# Patient Record
Sex: FEMALE | Race: WHITE | NOT HISPANIC OR LATINO | Employment: FULL TIME | URBAN - METROPOLITAN AREA
[De-identification: names, ages, dates, MRNs, and addresses within clinical notes are randomized per-mention and may not be internally consistent; named-entity substitution may affect disease eponyms.]

---

## 2017-04-17 ENCOUNTER — ALLSCRIPTS OFFICE VISIT (OUTPATIENT)
Dept: OTHER | Facility: OTHER | Age: 37
End: 2017-04-17

## 2017-04-17 DIAGNOSIS — G93.2 BENIGN INTRACRANIAL HYPERTENSION: ICD-10-CM

## 2017-08-22 ENCOUNTER — ALLSCRIPTS OFFICE VISIT (OUTPATIENT)
Dept: OTHER | Facility: OTHER | Age: 37
End: 2017-08-22

## 2017-10-23 NOTE — PROGRESS NOTES
Assessment  1  Occipital neuralgia of right side (723 8) (M54 81)   2  Pseudotumor cerebri (348 2) (G93 2)    Plan  Occipital neuralgia of right side    · Orphenadrine Citrate  MG Oral Tablet Extended Release 12 Hour; Take 1  tablet by mouth at bedtime   Rx By: Rachel Reaves; Dispense: 30 Days ; #:30 Tablet Extended Release 12 Hour; Refill: 3;For: Occipital neuralgia of right side; WESTON = N; Verified Transmission to [id number]; Last Updated By: System, SureScripts; 8/22/2017 11:14:20 AM  Pseudotumor cerebri    · AcetaZOLAMIDE 250 MG Oral Tablet; TAKE 2 TABLET Every 8 hours   Rx By: Rachel Reaves; Dispense: 30 Days ; #:180 Tablet; Refill: 3;For: Pseudotumor cerebri; WESTON = N; Verified Transmission to [id number]; Last Updated By: System, SureScripts; 8/22/2017 11:13:08 AM   · Follow-up visit in 4 Months Evaluation and Treatment  Follow-up  Status: Hold For -  Scheduling  Requested for: 58Lwu5702   Ordered; For: Pseudotumor cerebri; Ordered By: Rachel Reaves Performed:  Due: 03USZ1326   · Continue with our present treatment plan ; Status:Complete;   Done: 47Fpg9911   Ordered; For:Pseudotumor cerebri; Ordered By:Sony Nuñez;    Discussion/Summary  Discussion Summary:   Patient with a history of pseudotumor cerebri, occipital neuralgia is advised to taper herself off the gabapentin and continue orphenadrine on a regular basis  She also is advised to increase the dose of Diamox to 2 tablets 3 times a day if needed otherwise will maintain herself on 2 tablets twice a day  Patient has been doing that intermittently and runs out of her medications  She will return back to see me in 4 months  Chief Complaint  Chief Complaint Free Text Note Form: 39year old patient has returned for a follow up appointment for her pseudo tumor cerebri and neuralgia        History of Present Illness  HPI: Patient is here for a follow-up visit with a history of pseudotumor headaches, occipital neuralgia and overall has been maintaining herself with the help of Diamox, gabapentin and uses orphenadrine on a when necessary basis  She also is followed up with her OB/GYN and has been experiencing severe hormonal issues causing irregular periods  Patient denies any other neurological symptoms except for occasional worsening neck pain during which time she uses orphenadrine  Review of Systems  Neurological ROS:   Constitutional: recent weight gain-- and-- fatigue  HEENT:  no sinus problems, not feeling congested, no blurred vision, no dryness of the eyes, no eye pain, no hearing loss, no tinnitus, no mouth sores, no sore throat, no hoarseness, no dysphagia, no masses, no bleeding  Cardiovascular:  no chest pain or pressure, no palpitations present, the heart rate was not rapid or irregular, no swelling in the arms or legs, no poor circulation  Respiratory:  no unusual or persistant cough, no shortness of breath with or without exertion  Gastrointestinal: nausea  Genitourinary:  no incontinence, no feelings of urinary urgency, no increase in frequency, no urinary hesitancy, no dysuria, no hematuria  Musculoskeletal: head/neck/back pain  Integumentary  no masses, no rash, no skin lesions, no livedo reticularis  Psychiatric: anxiety-- and-- depression  Endocrine loss of sexual ability or drive   Hematologic/Lymphatic:  no unusual bleeding, no tendency for easy bruising, no clotting skin or lumps  Neurological General: headache,-- lightheadedness,-- increased sleepiness,-- trouble falling asleep-- and-- waking up at night  Neurological Mental Status: memory problems  Neurological Cranial Nerves: vertigo or dizziness  Neurological Motor findings include:  no tremor, no twitching, no cramping(pre/post exercise), no atrophy  Neurological Coordination:  no unsteadiness, no vertigo or dizziness, no clumsiness, no problems reaching for objects     Neurological Sensory:  no numbness, no pain, no tingling, does not fall when eyes closed or taking a shower  Neurological Gait:  no difficulty walking, not falling to one side, no sensation of being pushed, has not had falls  ROS Reviewed:   ROS reviewed  Active Problems  1  Anxiety (300 00) (F41 9)   2  Headache (784 0) (R51)   3  Hypothyroidism (244 9) (E03 9)   4  Insulin resistance (277 7) (E88 81)   5  Irritable bowel syndrome (564 1) (K58 9)   6  Occipital neuralgia of right side (723 8) (M54 81)   7  Papilledema (377 00) (H47 10)   8  Pseudotumor cerebri (348 2) (G93 2)    Past Medical History  1  History of asthma (V12 69) (Z87 09)    Surgical History  1  History of Adenoidectomy   2  History of  Section   3  History of Cholecystectomy   4  Denied: History of Cholesteatoma Surgery   5  History of Dilation And Curettage   6  History of Tonsillectomy   7  History of Umbilical Hernia Repair    Family History  Mother    1  Family history of hypertension (V17 49) (Z82 49)   2  Family history of hypothyroidism (V18 19) (Z83 49)   3  Family history of malignant neoplasm of breast (V16 3) (Z80 3)  Father    4  Family history of hypertension (V17 49) (Z82 49)   5  Family history of hypothyroidism (V18 19) (Z83 49)   6  Family history of Insulin resistance  Sister    7  Family history of hypothyroidism (V18 19) (Z83 49)    Social History   · Full-time employment   ·    · Never a smoker   · Social alcohol use (Z78 9)  Social History Reviewed: The social history was reviewed and updated today  Current Meds   1  AcetaZOLAMIDE 250 MG Oral Tablet; take 2 tablet twice daily; Therapy: 78YSV4048 to (Evaluate:37Vuz4256)  Requested for: 84Nnm5221; Last   Rx:83Tju7538 Ordered   2  Gabapentin 100 MG Oral Capsule; take 1 capsule 3 times a day; Therapy: 54VRD3660 to (Evaluate:84Ydj3255)  Requested for: 17Aed7710; Last   Rx:87Mdy5032 Ordered   3  Levothyroxine Sodium 88 MCG Oral Tablet; TAKE 1 TABLET DAILY;    Therapy: (Recorded:32Zwu0764) to Recorded   4  Multi-Vitamin TABS; TAKE 1 TABLET DAILY; Therapy: (Recorded:49Fer5680) to Recorded   5  Pantoprazole Sodium 40 MG Oral Tablet Delayed Release; TAKE 1 TABLET DAILY; Therapy: (Stefany Pantoja) to Recorded   6  Saxenda 18 MG/3ML Subcutaneous Solution Pen-injector; use as directed; Therapy: (Recorded:30Xdy5689) to Recorded   7  Singulair TABS; TAKE 1 TABLET DAILY; Therapy: (Recorded:11Iog8819) to Recorded   8  Venlafaxine HCl - 100 MG Oral Tablet; Take 1 tablet twice daily; Therapy: (Recorded:23Xhp6343) to Recorded   9  ZOLMitriptan 5 MG Oral Tablet; TAKE 1 TABLET AT ONSET OF MIGRAINE  MAY REPEAT   ONCE AFTER 2 HOURS  MAX 10 MG/DAY; Therapy: 99VYU3211 to (Wilmer Brower)  Requested for: 38RPH9462; Last   Rx:03Mar2017 Ordered  Medication List Reviewed: The medication list was reviewed and updated today  Allergies  1  Sulfa Drugs  2  Other   3  Mold   4  Seasonal    Vitals  Signs   Recorded: 22Aug2017 10:43AM   Heart Rate: 89  Systolic: 554  Diastolic: 84  Height: 5 ft 0 5 in  Weight: 197 lb   BMI Calculated: 37 84  BSA Calculated: 1 87    Physical Exam    Constitutional   General appearance: No acute distress, well appearing and well nourished  Musculoskeletal   Gait and station: Normal gait, stance and balance  Muscle strength: Normal strength throughout  Muscle tone: No atrophy, abnormal movements, flaccidity, cogwheeling or spasticity  Neurologic   Orientation to person, place, and time: Normal     Language: Names objects, able to repeat phrases and speaks spontaneously  3rd, 4th, and 6th cranial nerves: Normal     7th cranial nerve: Normal     Sensation: Normal     Reflexes: Normal     Coordination: Normal   Patient has evidence of suboccipital tenderness on the right side and upper cervical paraspinal tenderness  Signatures   Electronically signed by :  Suzy Michelle MD; Aug 22 2017 11:17AM EST                       (Author)

## 2018-01-08 ENCOUNTER — ALLSCRIPTS OFFICE VISIT (OUTPATIENT)
Dept: OTHER | Facility: OTHER | Age: 38
End: 2018-01-08

## 2018-01-09 NOTE — PROGRESS NOTES
Assessment   1  Occipital neuralgia of right side (723 8) (M54 81)   2  Pseudotumor cerebri (348 2) (G93 2)    Plan   Anxiety    · Venlafaxine HCl - 100 MG Oral Tablet   Dispense: 0 Days ; #: Sufficient Tablet; Refill: 0;For: Anxiety; WESTON = N; Record; Last Updated By: Allie Huston; 1/8/2018 10:37:29 AM  Pseudotumor cerebri    · Continue with our present treatment plan ; Status:Complete;   Done: 01NCI3448   Ordered; For:Pseudotumor cerebri; Ordered By:Sony Nuñez;   · Follow-up visit in 3 months Evaluation and Treatment  Follow-up  Status: Complete     Done: 88WXM1147   Ordered; For: Pseudotumor cerebri; Ordered By: Allie Huston Performed:  Due: 06YJX8459; Last Updated By: Dena Rai; 1/8/2018 10:49:15 AM    Discussion/Summary   Discussion Summary:    Patient with a history of occipital neuralgia, pseudotumor cerebri, is advised to continue present medications  She also suffers from fibromyalgia for which she remains on Cymbalta 60 mg daily  Home exercise program is encouraged, she is to undergo a partial hysterectomy this week for endometriosis and will return back to see me in 3 months  Chief Complaint   Chief Complaint Free Text Note Form: Patient is here for follow up visit for her history of occipital neuralgia and pseudotumor cerebri  History of Present Illness   HPI: Patient is here for a follow-up visit with a history of pseudotumor cerebri, occipital neuralgia, fibromyalgia, and is to is to undergo partial hysterectomy this week for endometriosis  Patient has been on Diamox 500 mg twice a day and Norflex 100 mg at bedtime which she uses only on a p r n  basis  Overall she has been doing well from a headache standpoint, with the current medications and denies any new neurological symptoms  Review of Systems   Neurological ROS:      Constitutional: fever,-- chills-- and-- appetite changes        HEENT:  no sinus problems, not feeling congested, no blurred vision, no dryness of the eyes, no eye pain, no hearing loss, no tinnitus, no mouth sores, no sore throat, no hoarseness, no dysphagia, no masses, no bleeding  Cardiovascular:  no chest pain or pressure, no palpitations present, the heart rate was not rapid or irregular, no swelling in the arms or legs, no poor circulation  Respiratory: unusual or persistant cough-- and-- shortness of breath with or without exertion  Gastrointestinal:  no nausea, no vomiting, no diarrhea, no abdominal pain, no changes in bowel habits, no melena, no loss of bowel control  Genitourinary:  no incontinence, no feelings of urinary urgency, no increase in frequency, no urinary hesitancy, no dysuria, no hematuria  Musculoskeletal: arthralgias,-- myalgias,-- head/neck/back pain-- and-- pain while walking  Integumentary  no masses, no rash, no skin lesions, no livedo reticularis  Psychiatric: anxiety  Endocrine  no unusual weight loss or gain, no excessive urination, no excessive thirst, no hair loss or gain, no hot or cold intolerance, no menstrual period change or irregularity, no loss of sexual ability or drive, no erection difficulty, no nipple discharge  Hematologic/Lymphatic:  no unusual bleeding, no tendency for easy bruising, no clotting skin or lumps  Neurological General: headache,-- lightheadedness,-- trouble falling asleep-- and-- waking up at night  Neurological Mental Status: memory problems  Neurological Cranial Nerves: facial numbness or weakness-- and-- vertigo or dizziness  Neurological Motor findings include: tremor,-- twitching-- and-- cramping(pre/post exercise)  Neurological Coordination: balance difficulties-- and-- clumsiness  Neurological Sensory: numbness-- and-- tingling  Neurological Gait: difficulty walking-- and-- has had falls  ROS Reviewed:    ROS reviewed  Active Problems   1  Anxiety (300 00) (F41 9)   2  Headache (784 0) (R51)   3   Hypothyroidism (244  9) (E03 9)   4  Insulin resistance (277 7) (E88 81)   5  Irritable bowel syndrome (564 1) (K58 9)   6  Occipital neuralgia of right side (723 8) (M54 81)   7  Papilledema (377 00) (H47 10)   8  Pseudotumor cerebri (348 2) (G93 2)    Past Medical History   1  History of asthma (V12 69) (Z87 09)    Surgical History   1  History of Adenoidectomy   2  History of  Section   3  History of Cholecystectomy   4  Denied: History of Cholesteatoma Surgery   5  History of Dilation And Curettage   6  History of Tonsillectomy   7  History of Umbilical Hernia Repair    Family History   Mother    1  Family history of hypertension (V17 49) (Z82 49)   2  Family history of hypothyroidism (V18 19) (Z83 49)   3  Family history of malignant neoplasm of breast (V16 3) (Z80 3)  Father    4  Family history of hypertension (V17 49) (Z82 49)   5  Family history of hypothyroidism (V18 19) (Z83 49)   6  Family history of Insulin resistance  Sister    7  Family history of hypothyroidism (V18 19) (Z83 49)    Social History    · Full-time employment   ·    · Never a smoker   · Social alcohol use (Z78 9)  Social History Reviewed: The social history was reviewed and updated today  Current Meds    1  AcetaZOLAMIDE 250 MG Oral Tablet; take 2 tablet twice daily; Therapy: 55UUE2281 to (Evaluate:80Cat9479)  Requested for: 98Enx6773; Last     Rx:62Iuz9672 Ordered   2  Cymbalta 60 MG Oral Capsule Delayed Release Particles; TAKE 1 CAPSULE Daily; Therapy: (([phone]) to Recorded   3  Gabapentin 100 MG Oral Capsule; TAKE 2 CAPSULE Twice daily; Therapy: 42VTO4643 to (Evaluate:2018)  Requested for: 54UWS4343; Last     Rx:2017 Ordered   4  Levothyroxine Sodium 88 MCG Oral Tablet; TAKE 1 TABLET DAILY; Therapy: (Recorded:74Nre3730) to Recorded   5  Multi-Vitamin TABS; TAKE 1 TABLET DAILY; Therapy: (Recorded:27Bob3865) to Recorded   6   Orphenadrine Citrate  MG Oral Tablet Extended Release 12 Hour; Take 1 tablet by     mouth at bedtime; Therapy: 45Zjv4951 to (Evaluate:84Qmn7055)  Requested for: 78Jmk6442; Last     Rx:51Orn0507 Ordered   7  Pantoprazole Sodium 40 MG Oral Tablet Delayed Release; TAKE 1 TABLET DAILY; Therapy: (Norris Skelton) to Recorded   8  Saxenda 18 MG/3ML Subcutaneous Solution Pen-injector; use as directed; Therapy: (Recorded:23Bcr2722) to Recorded   9  Singulair TABS; TAKE 1 TABLET DAILY; Therapy: (Recorded:11Sot0571) to Recorded   10  Venlafaxine HCl - 100 MG Oral Tablet; Take 1 tablet twice daily; Therapy: (Recorded:29Inu5916) to Recorded   11  Vitamin D 2000 UNIT Oral Tablet; Take 1 tablet twice daily; Therapy: (353 081 865) to Recorded   12  ZOLMitriptan 5 MG Oral Tablet; TAKE 1 TABLET AT ONSET OF MIGRAINE  MAY REPEAT      ONCE AFTER 2 HOURS  MAX 10 MG/DAY; Therapy: 91MPC1700 to (Tj Mckeon)  Requested for: 30HPV3903; Last      Rx:03Mar2017 Ordered  Medication List Reviewed: The medication list was reviewed and updated today  Allergies   1  Sulfa Drugs  2  Other   3  Mold   4  Seasonal    Vitals   Signs   Recorded: 39EKD4044 10:12AM   Heart Rate: 95  Systolic: 991  Diastolic: 76  Height: 5 ft   Weight: 206 lb   BMI Calculated: 40 23  BSA Calculated: 1 89    Physical Exam        Constitutional      General appearance: No acute distress, well appearing and well nourished  Musculoskeletal      Gait and station: Normal gait, stance and balance  Muscle strength: Normal strength throughout  Muscle tone: No atrophy, abnormal movements, flaccidity, cogwheeling or spasticity  Neurologic      Recent and remote memory: Demonstrates normal memory         3rd, 4th, and 6th cranial nerves: Normal        7th cranial nerve: Normal        Sensation: Normal        Reflexes: Normal        Coordination: Normal   Patient has evidence of cervical and suboccipital tenderness bilaterally more prominent on the left side than the right       Signatures    Electronically signed by :  Kwesi Baca MD; Jan 8 2018 10:50AM EST                       (Author)

## 2018-01-10 NOTE — MISCELLANEOUS
Dear Brittney Powers : We missed you for your originally scheduled neurological followup appointment with Dr Fredrick Flowers  Please call at your earliest convenience to reschedule this appointment  Sincerely,     [place]           Electronically signed by: Leonides Saldaña   Jul 7 2016 11:22AM EST Co-author

## 2018-01-12 VITALS
WEIGHT: 197 LBS | HEIGHT: 61 IN | HEART RATE: 89 BPM | DIASTOLIC BLOOD PRESSURE: 84 MMHG | SYSTOLIC BLOOD PRESSURE: 112 MMHG | BODY MASS INDEX: 37.19 KG/M2

## 2018-01-13 VITALS
BODY MASS INDEX: 36.47 KG/M2 | HEART RATE: 72 BPM | SYSTOLIC BLOOD PRESSURE: 127 MMHG | DIASTOLIC BLOOD PRESSURE: 74 MMHG | HEIGHT: 61 IN | WEIGHT: 193.19 LBS

## 2018-01-22 VITALS
BODY MASS INDEX: 40.44 KG/M2 | DIASTOLIC BLOOD PRESSURE: 76 MMHG | SYSTOLIC BLOOD PRESSURE: 104 MMHG | HEART RATE: 95 BPM | HEIGHT: 60 IN | WEIGHT: 206 LBS

## 2018-03-22 ENCOUNTER — TELEPHONE (OUTPATIENT)
Dept: NEUROLOGY | Facility: CLINIC | Age: 38
End: 2018-03-22

## 2018-03-22 NOTE — TELEPHONE ENCOUNTER
Called patient, left message on machine, advised that it could be the Synthroid or the phentermine causing the tremors and she should speak to her endocrinologist and advised to call back if she has any other questions

## 2018-03-22 NOTE — TELEPHONE ENCOUNTER
Patient requesting to speak to Dr Andre Newman (she stated she used to be able to leave a voicemail and Dr called her back)  Complaining B/L extremity muscle weakness and shaking  States when she signs something her hand "shakes"  When she hits the break while driving, her thigh shakes  She initially noticed this about 2 weeks ago  She stated she tried to get an appt but scheduling told her there was nothing available      Current meds:  Effexor 150 mg one at bed  Orphenadrine 100 mg at bed  Synthroid 88 mcg in AM  Gabapentin 100 mg 2 in AM and 2 in PM  Phentermine 37 mg in AM  saxenda inj daily  diamox 250 mg 2 in AM and 2 in PM  Vit D    [phone]

## 2018-03-26 ENCOUNTER — OFFICE VISIT (OUTPATIENT)
Dept: NEUROLOGY | Facility: CLINIC | Age: 38
End: 2018-03-26
Payer: COMMERCIAL

## 2018-03-26 VITALS
BODY MASS INDEX: 39.26 KG/M2 | WEIGHT: 201 LBS | HEART RATE: 96 BPM | DIASTOLIC BLOOD PRESSURE: 88 MMHG | SYSTOLIC BLOOD PRESSURE: 128 MMHG

## 2018-03-26 DIAGNOSIS — G93.2 PSEUDOTUMOR CEREBRI: ICD-10-CM

## 2018-03-26 DIAGNOSIS — M54.81 OCCIPITAL NEURALGIA OF RIGHT SIDE: ICD-10-CM

## 2018-03-26 DIAGNOSIS — F41.9 ANXIETY DISORDER, UNSPECIFIED TYPE: Primary | ICD-10-CM

## 2018-03-26 DIAGNOSIS — R25.1 TREMOR: ICD-10-CM

## 2018-03-26 PROCEDURE — 99214 OFFICE O/P EST MOD 30 MIN: CPT | Performed by: PSYCHIATRY & NEUROLOGY

## 2018-03-26 RX ORDER — MIRTAZAPINE 7.5 MG/1
7.5 TABLET, FILM COATED ORAL
Qty: 30 TABLET | Refills: 0 | Status: SHIPPED | OUTPATIENT
Start: 2018-03-26 | End: 2018-04-25

## 2018-03-26 RX ORDER — PEN NEEDLE, DIABETIC 32GX 5/32"
NEEDLE, DISPOSABLE MISCELLANEOUS
Refills: 4 | COMMUNITY
Start: 2018-03-19

## 2018-03-26 RX ORDER — LIRAGLUTIDE 6 MG/ML
INJECTION, SOLUTION SUBCUTANEOUS
Refills: 4 | COMMUNITY
Start: 2018-03-13 | End: 2019-09-17 | Stop reason: ALTCHOICE

## 2018-03-26 RX ORDER — ACETAZOLAMIDE 250 MG/1
2 TABLET ORAL 3 TIMES DAILY
COMMUNITY
Start: 2016-06-23 | End: 2018-07-26 | Stop reason: SDUPTHER

## 2018-03-26 RX ORDER — NITROFURANTOIN 25; 75 MG/1; MG/1
CAPSULE ORAL
Refills: 1 | COMMUNITY
Start: 2018-01-23 | End: 2018-09-23

## 2018-03-26 RX ORDER — ORPHENADRINE CITRATE 100 MG/1
100 TABLET, EXTENDED RELEASE ORAL
Refills: 3 | COMMUNITY
Start: 2018-03-08 | End: 2018-05-17

## 2018-03-26 RX ORDER — LEVOTHYROXINE SODIUM 88 UG/1
1 TABLET ORAL DAILY
COMMUNITY

## 2018-03-26 RX ORDER — MULTIVIT-MIN/IRON/FOLIC ACID/K 18-600-40
1 CAPSULE ORAL 2 TIMES DAILY
COMMUNITY
End: 2019-09-17 | Stop reason: ALTCHOICE

## 2018-03-26 RX ORDER — VENLAFAXINE HYDROCHLORIDE 150 MG/1
150 CAPSULE, EXTENDED RELEASE ORAL DAILY
COMMUNITY

## 2018-03-26 RX ORDER — PANTOPRAZOLE SODIUM 40 MG/1
1 TABLET, DELAYED RELEASE ORAL DAILY
COMMUNITY
End: 2019-09-17 | Stop reason: SDUPTHER

## 2018-03-26 RX ORDER — DULOXETIN HYDROCHLORIDE 60 MG/1
1 CAPSULE, DELAYED RELEASE ORAL DAILY
COMMUNITY
End: 2018-03-26 | Stop reason: ALTCHOICE

## 2018-03-26 RX ORDER — MULTIVITAMIN
1 TABLET ORAL DAILY
COMMUNITY

## 2018-03-26 RX ORDER — FLUCONAZOLE 150 MG/1
TABLET ORAL
Refills: 1 | COMMUNITY
Start: 2018-01-12 | End: 2018-09-23

## 2018-03-26 RX ORDER — MONTELUKAST SODIUM 10 MG/1
10 TABLET ORAL DAILY
Refills: 1 | COMMUNITY
Start: 2018-01-08

## 2018-03-26 RX ORDER — DULOXETIN HYDROCHLORIDE 60 MG/1
CAPSULE, DELAYED RELEASE ORAL
Refills: 0 | COMMUNITY
Start: 2018-02-01 | End: 2018-03-26 | Stop reason: ALTCHOICE

## 2018-03-26 RX ORDER — GABAPENTIN 100 MG/1
2 CAPSULE ORAL 2 TIMES DAILY
COMMUNITY
Start: 2016-03-31 | End: 2018-04-14 | Stop reason: SDUPTHER

## 2018-03-26 RX ORDER — PHENTERMINE HYDROCHLORIDE 37.5 MG/1
37.5 TABLET ORAL DAILY
Refills: 2 | COMMUNITY
Start: 2018-03-08 | End: 2019-09-17 | Stop reason: ALTCHOICE

## 2018-03-26 RX ORDER — ZOLMITRIPTAN 5 MG/1
1 TABLET, FILM COATED ORAL
COMMUNITY
Start: 2015-12-07 | End: 2019-09-17 | Stop reason: ALTCHOICE

## 2018-03-26 RX ORDER — VENLAFAXINE 100 MG/1
1 TABLET ORAL 2 TIMES DAILY
COMMUNITY
End: 2018-03-26 | Stop reason: ALTCHOICE

## 2018-03-26 NOTE — PROGRESS NOTES
Progress Note - Neurology   Lamonte Luna 44 y o  female MRN: [id number]  Unit/Bed#:  Encounter: [id number]      Subjective:   Patient is here for a follow-up visit with a history of chronic fibromyalgia, pseudotumor cerebri, and since her last visit had partial hysterectomy for endometriosis  Over the last couple of weeks she has been experiencing symptoms of a fine tremor affecting both upper and lower extremities worsened with purposeful activities, occasionally experiences paresthesias in the left upper extremity, complains of insomnia and sleep deprivation for the last 2-3 weeks  She denies any headaches at this time  For the pseudotumor she has been on Diamox and also uses gabapentin 200 mg twice a day as well as orphenadrine 100 mg at bedtime  Patient complains of occasional myoclonic jerks affecting the upper and lower extremities  ROS:   Review of Systems   Constitutional: Positive for fatigue  HENT: Negative  Eyes: Negative  Respiratory: Negative  Cardiovascular: Negative  Gastrointestinal: Negative  Endocrine: Positive for cold intolerance and heat intolerance  Genitourinary: Negative  Musculoskeletal: Negative  Skin: Negative  Allergic/Immunologic: Negative  Neurological: Positive for dizziness, tremors, speech difficulty, weakness, light-headedness, numbness and headaches  Hematological: Negative  Psychiatric/Behavioral: Positive for agitation and sleep disturbance  Vitals:   Vitals:    03/26/18 1133   BP: 128/88   Pulse: 96   ,Body mass index is 39 26 kg/m²      MEDS:      Current Outpatient Prescriptions:     acetaZOLAMIDE (DIAMOX) 250 mg tablet, Take 2 tablets by mouth 3 (three) times a day, Disp: , Rfl:     BD PEN NEEDLE ANDREA U/F 32G X 4 MM MISC, ONCE DAILY, Disp: , Rfl: 4    Cholecalciferol (VITAMIN D) 2000 units CAPS, Take 1 tablet by mouth 2 (two) times a day, Disp: , Rfl:     gabapentin (NEURONTIN) 100 mg capsule, Take 2 capsules by mouth 2 (two) times a day, Disp: , Rfl:     levothyroxine 88 mcg tablet, Take 1 tablet by mouth daily, Disp: , Rfl:     montelukast (SINGULAIR) 10 mg tablet, Take 10 mg by mouth daily, Disp: , Rfl: 1    Multiple Vitamin (MULTI-VITAMIN DAILY) TABS, Take 1 tablet by mouth daily, Disp: , Rfl:     orphenadrine (NORFLEX) 100 mg tablet, Take 100 mg by mouth daily at bedtime, Disp: , Rfl: 3    phentermine (ADIPEX-P) 37 5 MG tablet, Take 37 5 mg by mouth daily, Disp: , Rfl: 2    SAXENDA 18 MG/3ML SOPN, ADMINISTER HALF OF A ML (3 MG) SUBCUTANEOUSLY DAILY, Disp: , Rfl: 4    venlafaxine (EFFEXOR-XR) 150 mg 24 hr capsule, Take 150 mg by mouth daily, Disp: , Rfl:     ZOLMitriptan (ZOMIG) 5 MG tablet, Take 1 tablet by mouth, Disp: , Rfl:     DULoxetine (CYMBALTA) 60 mg delayed release capsule, Take 1 capsule by mouth daily, Disp: , Rfl:     DULoxetine (CYMBALTA) 60 mg delayed release capsule, TAKE 1 CAPSULE (60 MG TOTAL) BY MOUTH DAILY  , Disp: , Rfl: 0    fluconazole (DIFLUCAN) 150 mg tablet, TAKE 1 TABLET BY MOUTH ONCE A DAY FOR 1 DOSE, Disp: , Rfl: 1    nitrofurantoin (MACROBID) 100 mg capsule, TAKE ONE CAPSULE BY MOUTH TWICE A DAY FOR 5 DAYS, Disp: , Rfl: 1    pantoprazole (PROTONIX) 40 mg tablet, Take 1 tablet by mouth daily, Disp: , Rfl:     venlafaxine (EFFEXOR) 100 MG tablet, Take 1 tablet by mouth 2 (two) times a day, Disp: , Rfl:   :    Physical Exam:  General appearance: alert, appears stated age and cooperative  Head: Normocephalic, without obvious abnormality, atraumatic    Neurologic:  Her examination shows evidence of a fine tremor chiefly seen in both hands, with purposeful as well as outstretched extremities no evidence of any weakness or drift noted in the upper extremities, her strength is preserved bilaterally no sensory loss was noted, and there is no evidence of any dysmetria  Gait is normal based  Lab Results: I have personally reviewed pertinent reports    Imaging Studies: I have personally reviewed pertinent reports  Assessment:  1  Tremors most likely related to anxiety versus medication effect  2  Chronic fibromyalgia  3  Pseudotumor cerebri  Plan:  Patient is advised to lower the dose of gabapentin, 200 mg 3 times a day, is also advised to discuss with her primary physician to lower the dose of Effexor and take Effexor in the morning and was prescribed Remeron 7 5 mg at bedtime to help with the tremor as well as the anxiety and insomnia she is advised to discontinue nor flex at this time  Patient also has a follow-up appointment with her rheumatologist and recommended further blood work  She will return back to see me in 2 months     3/26/2018,11:41 AM    Dictation voice to text software has been used in the creation of this document  Please consider this in light of any contextual or grammatical errors

## 2018-04-14 DIAGNOSIS — M54.81 OCCIPITAL NEURALGIA, UNSPECIFIED LATERALITY: Primary | ICD-10-CM

## 2018-04-14 RX ORDER — GABAPENTIN 100 MG/1
CAPSULE ORAL
Qty: 120 CAPSULE | Refills: 1 | Status: SHIPPED | OUTPATIENT
Start: 2018-04-14 | End: 2018-06-23 | Stop reason: SDUPTHER

## 2018-05-22 ENCOUNTER — TELEPHONE (OUTPATIENT)
Dept: NEUROLOGY | Facility: CLINIC | Age: 38
End: 2018-05-22

## 2018-06-23 DIAGNOSIS — M54.81 OCCIPITAL NEURALGIA, UNSPECIFIED LATERALITY: ICD-10-CM

## 2018-06-25 RX ORDER — GABAPENTIN 100 MG/1
CAPSULE ORAL
Qty: 120 CAPSULE | Refills: 1 | Status: SHIPPED | OUTPATIENT
Start: 2018-06-25 | End: 2018-08-25 | Stop reason: SDUPTHER

## 2018-07-26 DIAGNOSIS — G93.2 PSEUDOTUMOR CEREBRI: Primary | ICD-10-CM

## 2018-07-26 RX ORDER — ACETAZOLAMIDE 250 MG/1
TABLET ORAL
Qty: 120 TABLET | Refills: 6 | Status: SHIPPED | OUTPATIENT
Start: 2018-07-26

## 2018-08-25 DIAGNOSIS — M54.81 OCCIPITAL NEURALGIA, UNSPECIFIED LATERALITY: ICD-10-CM

## 2018-08-27 RX ORDER — GABAPENTIN 100 MG/1
CAPSULE ORAL
Qty: 120 CAPSULE | Refills: 1 | Status: SHIPPED | OUTPATIENT
Start: 2018-08-27 | End: 2019-03-14

## 2018-09-23 ENCOUNTER — HOSPITAL ENCOUNTER (EMERGENCY)
Facility: HOSPITAL | Age: 38
Discharge: HOME | End: 2018-09-23
Attending: EMERGENCY MEDICINE | Admitting: EMERGENCY MEDICINE
Payer: COMMERCIAL

## 2018-09-23 ENCOUNTER — APPOINTMENT (EMERGENCY)
Dept: RADIOLOGY | Facility: HOSPITAL | Age: 38
End: 2018-09-23
Payer: COMMERCIAL

## 2018-09-23 DIAGNOSIS — S82.851A CLOSED TRIMALLEOLAR FRACTURE OF RIGHT ANKLE, INITIAL ENCOUNTER: ICD-10-CM
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
## 2018-09-23 DIAGNOSIS — S82.891A CLOSED FRACTURE DISLOCATION OF RIGHT ANKLE, INITIAL ENCOUNTER: Primary | ICD-10-CM

## 2018-09-23 LAB
ABO GROUP BLD: NORMAL
ANION GAP SERPL CALCULATED.3IONS-SCNC: 10 MMOL/L (ref 4–13)
ANION GAP SERPL CALCULATED.3IONS-SCNC: 11 MMOL/L (ref 4–13)
APTT PPP: 25 SECONDS (ref 24–36)
BASOPHILS # BLD AUTO: 0.05 THOUSANDS/ΜL (ref 0–0.1)
BASOPHILS NFR BLD AUTO: 0 % (ref 0–1)
BLD GP AB SCN SERPL QL: NEGATIVE
BUN SERPL-MCNC: 10 MG/DL (ref 5–25)
BUN SERPL-MCNC: 10 MG/DL (ref 5–25)
CALCIUM SERPL-MCNC: 7.8 MG/DL (ref 8.3–10.1)
CALCIUM SERPL-MCNC: 8.3 MG/DL (ref 8.3–10.1)
CHLORIDE SERPL-SCNC: 110 MMOL/L (ref 100–108)
CHLORIDE SERPL-SCNC: 96 MMOL/L (ref 100–108)
CO2 SERPL-SCNC: 18 MMOL/L (ref 21–32)
CO2 SERPL-SCNC: 20 MMOL/L (ref 21–32)
CREAT SERPL-MCNC: 0.64 MG/DL (ref 0.6–1.3)
CREAT SERPL-MCNC: 0.85 MG/DL (ref 0.6–1.3)
EOSINOPHIL # BLD AUTO: 0.27 THOUSAND/ΜL (ref 0–0.61)
EOSINOPHIL NFR BLD AUTO: 2 % (ref 0–6)
ERYTHROCYTE [DISTWIDTH] IN BLOOD BY AUTOMATED COUNT: 13.6 % (ref 11.6–15.1)
GFR SERPL CREATININE-BSD FRML MDRD: 113 ML/MIN/1.73SQ M
GFR SERPL CREATININE-BSD FRML MDRD: 87 ML/MIN/1.73SQ M
GLUCOSE SERPL-MCNC: 112 MG/DL (ref 65–140)
GLUCOSE SERPL-MCNC: 95 MG/DL (ref 65–140)
HCT VFR BLD AUTO: 42.2 % (ref 34.8–46.1)
HGB BLD-MCNC: 13.3 G/DL (ref 11.5–15.4)
IMM GRANULOCYTES # BLD AUTO: 0.05 THOUSAND/UL (ref 0–0.2)
IMM GRANULOCYTES NFR BLD AUTO: 0 % (ref 0–2)
INR PPP: 1.01 (ref 0.86–1.17)
LYMPHOCYTES # BLD AUTO: 3.08 THOUSANDS/ΜL (ref 0.6–4.47)
LYMPHOCYTES NFR BLD AUTO: 25 % (ref 14–44)
MCH RBC QN AUTO: 28.2 PG (ref 26.8–34.3)
MCHC RBC AUTO-ENTMCNC: 31.5 G/DL (ref 31.4–37.4)
MCV RBC AUTO: 90 FL (ref 82–98)
MONOCYTES # BLD AUTO: 0.63 THOUSAND/ΜL (ref 0.17–1.22)
MONOCYTES NFR BLD AUTO: 5 % (ref 4–12)
NEUTROPHILS # BLD AUTO: 8.09 THOUSANDS/ΜL (ref 1.85–7.62)
NEUTS SEG NFR BLD AUTO: 68 % (ref 43–75)
NRBC BLD AUTO-RTO: 0 /100 WBCS
PLATELET # BLD AUTO: 317 THOUSANDS/UL (ref 149–390)
PMV BLD AUTO: 9.8 FL (ref 8.9–12.7)
POTASSIUM SERPL-SCNC: 2.9 MMOL/L (ref 3.5–5.3)
POTASSIUM SERPL-SCNC: 4.1 MMOL/L (ref 3.5–5.3)
PROTHROMBIN TIME: 13.2 SECONDS (ref 11.8–14.2)
RBC # BLD AUTO: 4.71 MILLION/UL (ref 3.81–5.12)
RH BLD: POSITIVE
SODIUM SERPL-SCNC: 125 MMOL/L (ref 136–145)
SODIUM SERPL-SCNC: 140 MMOL/L (ref 136–145)
SPECIMEN EXPIRATION DATE: NORMAL
WBC # BLD AUTO: 12.17 THOUSAND/UL (ref 4.31–10.16)

## 2018-09-23 PROCEDURE — 96375 TX/PRO/DX INJ NEW DRUG ADDON: CPT

## 2018-09-23 PROCEDURE — 73600 X-RAY EXAM OF ANKLE: CPT

## 2018-09-23 PROCEDURE — 85025 COMPLETE CBC W/AUTO DIFF WBC: CPT | Performed by: EMERGENCY MEDICINE

## 2018-09-23 PROCEDURE — 86850 RBC ANTIBODY SCREEN: CPT | Performed by: EMERGENCY MEDICINE

## 2018-09-23 PROCEDURE — 85610 PROTHROMBIN TIME: CPT | Performed by: EMERGENCY MEDICINE

## 2018-09-23 PROCEDURE — 99284 EMERGENCY DEPT VISIT MOD MDM: CPT

## 2018-09-23 PROCEDURE — 96361 HYDRATE IV INFUSION ADD-ON: CPT

## 2018-09-23 PROCEDURE — 73610 X-RAY EXAM OF ANKLE: CPT

## 2018-09-23 PROCEDURE — 85730 THROMBOPLASTIN TIME PARTIAL: CPT | Performed by: EMERGENCY MEDICINE

## 2018-09-23 PROCEDURE — 96376 TX/PRO/DX INJ SAME DRUG ADON: CPT

## 2018-09-23 PROCEDURE — 73590 X-RAY EXAM OF LOWER LEG: CPT

## 2018-09-23 PROCEDURE — 96374 THER/PROPH/DIAG INJ IV PUSH: CPT

## 2018-09-23 PROCEDURE — 80048 BASIC METABOLIC PNL TOTAL CA: CPT | Performed by: EMERGENCY MEDICINE

## 2018-09-23 PROCEDURE — 86900 BLOOD TYPING SEROLOGIC ABO: CPT | Performed by: EMERGENCY MEDICINE

## 2018-09-23 PROCEDURE — 36415 COLL VENOUS BLD VENIPUNCTURE: CPT | Performed by: EMERGENCY MEDICINE

## 2018-09-23 PROCEDURE — 86901 BLOOD TYPING SEROLOGIC RH(D): CPT | Performed by: EMERGENCY MEDICINE

## 2018-09-23 RX ORDER — HYDROCODONE BITARTRATE AND ACETAMINOPHEN 5; 325 MG/1; MG/1
1 TABLET ORAL ONCE
Status: COMPLETED | OUTPATIENT
Start: 2018-09-23 | End: 2018-09-23

## 2018-09-23 RX ORDER — ACETAMINOPHEN 325 MG/1
650 TABLET ORAL ONCE
Status: COMPLETED | OUTPATIENT
Start: 2018-09-23 | End: 2018-09-23

## 2018-09-23 RX ORDER — TRAMADOL HYDROCHLORIDE 50 MG/1
50 TABLET ORAL EVERY 6 HOURS PRN
Qty: 20 TABLET | Refills: 0 | Status: SHIPPED | OUTPATIENT
Start: 2018-09-23 | End: 2018-09-27

## 2018-09-23 RX ORDER — ACETAMINOPHEN 160 MG/5ML
650 SUSPENSION, ORAL (FINAL DOSE FORM) ORAL ONCE
Status: DISCONTINUED | OUTPATIENT
Start: 2018-09-23 | End: 2018-09-23

## 2018-09-23 RX ORDER — ONDANSETRON 4 MG/1
4 TABLET, ORALLY DISINTEGRATING ORAL ONCE
Status: DISCONTINUED | OUTPATIENT
Start: 2018-09-23 | End: 2018-09-23

## 2018-09-23 RX ORDER — ONDANSETRON 2 MG/ML
4 INJECTION INTRAMUSCULAR; INTRAVENOUS ONCE
Status: COMPLETED | OUTPATIENT
Start: 2018-09-23 | End: 2018-09-23

## 2018-09-23 RX ORDER — ONDANSETRON 4 MG/1
4 TABLET, ORALLY DISINTEGRATING ORAL EVERY 8 HOURS PRN
Qty: 12 TABLET | Refills: 0 | Status: SHIPPED | OUTPATIENT
Start: 2018-09-23 | End: 2018-09-26

## 2018-09-23 RX ORDER — ONDANSETRON 2 MG/ML
INJECTION INTRAMUSCULAR; INTRAVENOUS
Status: COMPLETED
Start: 2018-09-23 | End: 2018-09-23

## 2018-09-23 RX ORDER — KETOROLAC TROMETHAMINE 30 MG/ML
15 INJECTION, SOLUTION INTRAMUSCULAR; INTRAVENOUS ONCE
Status: COMPLETED | OUTPATIENT
Start: 2018-09-23 | End: 2018-09-23

## 2018-09-23 RX ADMIN — ONDANSETRON 4 MG: 2 INJECTION INTRAMUSCULAR; INTRAVENOUS at 18:33

## 2018-09-23 RX ADMIN — SODIUM CHLORIDE 1000 ML: 0.9 INJECTION, SOLUTION INTRAVENOUS at 18:35

## 2018-09-23 RX ADMIN — HYDROCODONE BITARTRATE AND ACETAMINOPHEN 1 TABLET: 5; 325 TABLET ORAL at 20:55

## 2018-09-23 RX ADMIN — HYDROMORPHONE HYDROCHLORIDE 1 MG: 1 INJECTION, SOLUTION INTRAMUSCULAR; INTRAVENOUS; SUBCUTANEOUS at 18:32

## 2018-09-23 RX ADMIN — ONDANSETRON 4 MG: 2 INJECTION INTRAMUSCULAR; INTRAVENOUS at 20:03

## 2018-09-23 RX ADMIN — KETOROLAC TROMETHAMINE 15 MG: 30 INJECTION, SOLUTION INTRAMUSCULAR at 18:35

## 2018-09-23 RX ADMIN — ACETAMINOPHEN 650 MG: 325 TABLET, FILM COATED ORAL at 20:55

## 2018-09-23 RX ADMIN — Medication 80 MG: at 19:15

## 2018-09-23 NOTE — ED PROVIDER NOTES
History  Chief Complaint   Patient presents with    Ankle Injury     Pt states she tripped and rolled right ankle, and heard a "crack"  Pt pain 810     14-year-old female history fibromyalgia psuedotumor presenting chief complaint of right ankle pain  Patient was wearing flip-flops trying to hang Halloween decorations immediately prior to arrival when she misstepped and immediately inverted her right ankle causing injury, she did fall to the ground but she did not strike her head or lose consciousness she had pain and deformity to her right had ankle and distal leg, she states she is unable to move it secondary to the pain but is able to wiggle her toes she has had her toes are tingling but no other weakness paresthesias or anesthesia she has no knee or hip pain she has no back pain no other injuries complaints or concerns otherwise denies a complete review systems as noted she has status post hysterectomy            Prior to Admission Medications   Prescriptions Last Dose Informant Patient Reported? Taking?    BD PEN NEEDLE ANDREA U/F 32G X 4 MM MISC 9/23/2018 at Unknown time  Yes Yes   Sig: ONCE DAILY   Cholecalciferol (VITAMIN D) 2000 units CAPS 9/23/2018 at Unknown time  Yes Yes   Sig: Take 1 tablet by mouth 2 (two) times a day   Multiple Vitamin (MULTI-VITAMIN DAILY) TABS 9/23/2018 at Unknown time  Yes Yes   Sig: Take 1 tablet by mouth daily   SAXENDA 18 MG/3ML SOPN 9/23/2018 at Unknown time  Yes Yes   Sig: ADMINISTER HALF OF A ML (3 MG) SUBCUTANEOUSLY DAILY   ZOLMitriptan (ZOMIG) 5 MG tablet 9/23/2018 at Unknown time  Yes Yes   Sig: Take 1 tablet by mouth   acetaZOLAMIDE (DIAMOX) 250 mg tablet 9/23/2018 at Unknown time  No Yes   Sig: TAKE 2 TABLET TWICE DAILY   gabapentin (NEURONTIN) 100 mg capsule 9/23/2018 at Unknown time  No Yes   Sig: TAKE 2 CAPSULES BY MOUTH TWICE DAILY   levothyroxine 88 mcg tablet 9/23/2018 at Unknown time  Yes Yes   Sig: Take 1 tablet by mouth daily   mirtazapine (REMERON) 7 5 MG tablet   No No   Sig: Take 1 tablet (7 5 mg total) by mouth daily at bedtime for 30 days   montelukast (SINGULAIR) 10 mg tablet 2018 at Unknown time  Yes Yes   Sig: Take 10 mg by mouth daily   pantoprazole (PROTONIX) 40 mg tablet 2018 at Unknown time  Yes Yes   Sig: Take 1 tablet by mouth daily   phentermine (ADIPEX-P) 37 5 MG tablet 2018 at Unknown time  Yes Yes   Sig: Take 37 5 mg by mouth daily   venlafaxine (EFFEXOR-XR) 150 mg 24 hr capsule 2018 at Unknown time Self Yes Yes   Sig: Take 150 mg by mouth daily      Facility-Administered Medications: None       Past Medical History:   Diagnosis Date    Anxiety     Headache     Hypothyroidism     IBS (irritable bowel syndrome)     Insulin resistance     Occipital neuralgia     Papilledema     Pseudotumor cerebri     Tremor        Past Surgical History:   Procedure Laterality Date     SECTION      CHOLECYSTECTOMY      CHOLESTEATOMA EXCISION      DILATION AND CURETTAGE OF UTERUS      ENDOMETRIAL BIOPSY      HYSTERECTOMY      TONSILLECTOMY AND ADENOIDECTOMY      UMBILICAL HERNIA REPAIR         Family History   Problem Relation Age of Onset    Hypertension Mother     Hypothyroidism Mother     Hypertension Father     Hypothyroidism Father     Insulin resistance Father     Hypothyroidism Sister      I have reviewed and agree with the history as documented  Social History   Substance Use Topics    Smoking status: Never Smoker    Smokeless tobacco: Never Used    Alcohol use Yes      Comment: social        Review of Systems   Constitutional: Negative for activity change, appetite change and fever  Respiratory: Negative for cough and shortness of breath  Cardiovascular: Negative for chest pain and palpitations  Gastrointestinal: Negative for nausea and vomiting  Musculoskeletal: Positive for gait problem and joint swelling  Negative for back pain, neck pain and neck stiffness          Right ankle pain   Skin: Negative for color change, pallor, rash and wound  Neurological: Negative for weakness, numbness and headaches  Hematological: Negative for adenopathy  Does not bruise/bleed easily  Psychiatric/Behavioral: Negative for agitation and behavioral problems  All other systems reviewed and are negative  Physical Exam  Physical Exam   Constitutional: She is oriented to person, place, and time  She appears well-developed and well-nourished  On initial exam patient appears mildly uncomfortable intermittently tearful,  at mother at bedside   HENT:   Head: Normocephalic and atraumatic  Right Ear: External ear normal    Left Ear: External ear normal    Head neck atraumatic nontender   Eyes: EOM are normal  Pupils are equal, round, and reactive to light  Neck: Normal range of motion  Neck supple  No tracheal deviation present  Cardiovascular: Normal rate, regular rhythm and normal heart sounds  Exam reveals no gallop and no friction rub  No murmur heard  Pulmonary/Chest: Effort normal and breath sounds normal  She has no wheezes  She has no rales  Abdominal: Soft  Bowel sounds are normal  She exhibits no distension  There is no tenderness  There is no rebound and no guarding     Musculoskeletal:   Complete muscle skeletal exam again her head neck her atraumatic and nontender she has no tenderness over her back chest or abdomen she has full active and passive range of motion without pain tenderness or injury of the bilateral upper extremities and left lower extremity regards to her right lower extremity she has no pain with range of motion of the hip no pain over the femur no pain over the knee quadriceps intact she has a closed deformity of her right distal leg and ankle, there are no poke holes no skin injuries no abrasions this is a closed deformity, she is tender diffusely over the right ankle with moderate amount of swelling, she is able wiggle all of her toes plantar and dorsiflex her right great toe she has 2+ palpable dorsalis pedis and posterior tibial pulses on both her right and left lower extremity sensation is grossly intact although she notes some tingling in her toes cap refill is brisk in the toes there is no other bony tenderness of the foot    Neurological: She is alert and oriented to person, place, and time  No cranial nerve deficit  She exhibits normal muscle tone  Coordination normal    Skin: Skin is warm and dry  No rash noted  Psychiatric: She has a normal mood and affect  Her behavior is normal    Nursing note and vitals reviewed        Vital Signs  ED Triage Vitals   Temperature Pulse Respirations Blood Pressure SpO2   09/24/18 0120 09/23/18 1819 09/23/18 1819 09/23/18 1819 09/23/18 1819   97 6 °F (36 4 °C) 102 (!) 26 (!) 171/94 98 %      Temp Source Heart Rate Source Patient Position - Orthostatic VS BP Location FiO2 (%)   09/24/18 0120 09/23/18 1819 09/23/18 1819 09/23/18 1819 --   Oral Monitor Lying Left arm       Pain Score       09/23/18 1819       Worst Possible Pain           Vitals:    09/23/18 2000 09/23/18 2010 09/23/18 2045 09/23/18 2245   BP: 139/63 (!) 215/125 127/75 124/68   Pulse: 104 104 98 87   Patient Position - Orthostatic VS: Lying Lying Lying Sitting       Visual Acuity      ED Medications  Medications   sodium chloride 0 9 % bolus 1,000 mL (0 mL Intravenous Stopped 9/23/18 2247)   HYDROmorphone (DILAUDID) injection 1 mg (1 mg Intravenous Given 9/23/18 1832)   ketorolac (TORADOL) injection 15 mg (15 mg Intravenous Given 9/23/18 1835)   ondansetron (ZOFRAN) injection 4 mg (4 mg Intravenous Given 9/23/18 1833)   ketamine (KETALAR) 10 mg/mL IV use 180 mg (80 mg Intravenous Given 9/23/18 1915)   ondansetron (ZOFRAN) injection 4 mg (4 mg Intravenous Given 9/23/18 2003)   HYDROcodone-acetaminophen (NORCO) 5-325 mg per tablet 1 tablet (1 tablet Oral Given 9/23/18 2055)   acetaminophen (TYLENOL) tablet 650 mg (650 mg Oral Given 9/23/18 2055)       Diagnostic Studies  Results Reviewed     Procedure Component Value Units Date/Time    Basic metabolic panel [[id number]]  (Abnormal) Collected:  09/23/18 1910    Lab Status:  Final result Specimen:  Blood from Hand, Right Updated:  09/23/18 2009     Sodium 140 mmol/L      Potassium 4 1 mmol/L      Chloride 110 (H) mmol/L      CO2 20 (L) mmol/L      ANION GAP 10 mmol/L      BUN 10 mg/dL      Creatinine 0 64 mg/dL      Glucose 95 mg/dL      Calcium 7 8 (L) mg/dL      eGFR 113 ml/min/1 73sq m     Narrative:         National Kidney Disease Education Program recommendations are as follows:  GFR calculation is accurate only with a steady state creatinine  Chronic Kidney disease less than 60 ml/min/1 73 sq  meters  Kidney failure less than 15 ml/min/1 73 sq  meters  Protime-INR [[id number]]  (Normal) Collected:  09/23/18 1838    Lab Status:  Final result Specimen:  Blood from Arm, Right Updated:  09/23/18 1857     Protime 13 2 seconds      INR 1 01    APTT [[id number]]  (Normal) Collected:  09/23/18 1838    Lab Status:  Final result Specimen:  Blood from Arm, Right Updated:  09/23/18 1857     PTT 25 seconds     Basic metabolic panel [[id number]]  (Abnormal) Collected:  09/23/18 1837    Lab Status:  Final result Specimen:  Blood from Arm, Right Updated:  09/23/18 1855     Sodium 125 (L) mmol/L      Potassium 2 9 (L) mmol/L      Chloride 96 (L) mmol/L      CO2 18 (L) mmol/L      ANION GAP 11 mmol/L      BUN 10 mg/dL      Creatinine 0 85 mg/dL      Glucose 112 mg/dL      Calcium 8 3 mg/dL      eGFR 87 ml/min/1 73sq m     Narrative:         National Kidney Disease Education Program recommendations are as follows:  GFR calculation is accurate only with a steady state creatinine  Chronic Kidney disease less than 60 ml/min/1 73 sq  meters  Kidney failure less than 15 ml/min/1 73 sq  meters      CBC and differential [[id number]]  (Abnormal) Collected:  09/23/18 1837    Lab Status:  Final result Specimen:  Blood from Arm, Right Updated:  09/23/18 1854     WBC 12 17 (H) Thousand/uL      RBC 4 71 Million/uL      Hemoglobin 13 3 g/dL      Hematocrit 42 2 %      MCV 90 fL      MCH 28 2 pg      MCHC 31 5 g/dL      RDW 13 6 %      MPV 9 8 fL      Platelets 834 Thousands/uL      nRBC 0 /100 WBCs      Neutrophils Relative 68 %      Immat GRANS % 0 %      Lymphocytes Relative 25 %      Monocytes Relative 5 %      Eosinophils Relative 2 %      Basophils Relative 0 %      Neutrophils Absolute 8 09 (H) Thousands/µL      Immature Grans Absolute 0 05 Thousand/uL      Lymphocytes Absolute 3 08 Thousands/µL      Monocytes Absolute 0 63 Thousand/µL      Eosinophils Absolute 0 27 Thousand/µL      Basophils Absolute 0 05 Thousands/µL                  XR ankle 3+ views RIGHT   ED Interpretation by Steve Whelan DO (09/23 1953)   Improved post reduction alignment, oblique distal fibula fracture, medial malleolus fracture with intra-articular extension, posterior malleolus fracture, mortise and alignment intact      XR ankle 2 vw right   ED Interpretation by Steve Whelan DO (09/23 1936)   Fracture dislocation ankle       by Anh Fraser (09/23 1850)      XR tibia fibula 2 views RIGHT   ED Interpretation by Steve Whelan DO (09/23 1932)   Fracture dislocation of ankle                 Procedures  Procedural Sedation  Date/Time: 9/23/2018 7:13 PM  Performed by: Florian Mccoy  Authorized by: Florian Mccoy     Consent:     Consent obtained:  Verbal    Consent given by:  Patient and spouse    Risks discussed:   Allergic reaction, dysrhythmia, nausea, inadequate sedation, vomiting, respiratory compromise necessitating ventilatory assistance and intubation and prolonged sedation necessitating reversal    Alternatives discussed:  Analgesia without sedation, anxiolysis and regional anesthesia  [place] protocol:     Procedure explained and questions answered to patient or proxy's satisfaction: yes      Relevant documents present and verified: yes      Test results available and properly labeled: yes      Radiology Images displayed and confirmed    If images not available, report reviewed: yes      Required blood products, implants, devices, and special equipment available: yes      Site/side marked: yes      Immediately prior to procedure a time out was called: yes      Patient identity confirmation method:  Verbally with patient and arm band  Indications:     Sedation purpose:  Dislocation reduction    Procedure necessitating sedation performed by:  Physician performing sedation    Intended level of sedation:  Moderate (conscious sedation)  Pre-sedation assessment:     Time since last food or drink:  3 hours    NPO status caution: urgency dictates proceeding with non-ideal NPO status      ASA classification: class 2 - patient with mild systemic disease      Neck mobility: normal      Mouth openin finger widths    Thyromental distance:  3 finger widths    Mallampati score:  III - soft palate, base of uvula visible    Pre-sedation assessments completed and reviewed: airway patency, cardiovascular function, hydration status, mental status, nausea/vomiting, pain level, respiratory function and temperature      History of difficult intubation: no      Pre-sedation assessment completed:  2018 7:05 PM  Immediate pre-procedure details:     Reassessment: Patient reassessed immediately prior to procedure      Reviewed: vital signs, relevant labs/tests and NPO status      Verified: bag valve mask available, emergency equipment available, intubation equipment available, IV patency confirmed, oxygen available, reversal medications available and suction available    Procedure details (see MAR for exact dosages):     Sedation start time:  2018 7:15 PM    Preoxygenation:  Nasal cannula    Sedation:  Ketamine    Analgesia:  Hydromorphone    Intra-procedure monitoring:  Blood pressure monitoring, cardiac monitor, continuous pulse oximetry, continuous capnometry, frequent LOC assessments and frequent vital sign checks    Intra-procedure events: none      Sedation end time:  9/23/2018 7:40 PM    Total sedation time (minutes):  25  Post-procedure details:     Post-sedation assessment completed:  9/23/2018 8:00 PM    Attendance: Constant attendance by certified staff until patient recovered      Recovery: Patient returned to pre-procedure baseline      Post-sedation assessments completed and reviewed: airway patency, cardiovascular function, hydration status, mental status, nausea/vomiting, pain level, respiratory function and temperature      Patient is stable for discharge or admission: yes      Patient tolerance:   Tolerated well, no immediate complications  Comments:      Patient was given a total of 1 milligram/kilogram or 90 mg of IV ketamine slow, IV push the, anesthesia was achieved the leg was reduced successfully in splinted, there were no adverse events or intra procedure complications patient tolerated sedation well noted some mild nausea which she related to receiving Vicodin afterwards without vomiting  Orthopedic Injury  Date/Time: 9/23/2018 7:13 PM  Performed by: Mario Francisco by: Troy Martinez     Patient Location:  ED  Other Assisting Provider: No    Verbal consent obtained?: Yes    Risks and benefits: Risks, benefits and alternatives were discussed    Consent given by:  Patient  Patient states understanding of procedure being performed: Yes    Patient's understanding of procedure matches consent: Yes    Procedure consent matches procedure scheduled: Yes    Required items: Required blood products, implants, devices and special equipment available    Patient identity confirmed:  Verbally with patient and arm band  Time out: Immediately prior to the procedure a time out was called    Injury location:  Ankle  Location details:  Right ankle  Injury type:  Fracture-dislocation  Fracture type: trimalleolar    Distal perfusion: normal    Neurological function: normal    Range of motion: reduced    Local anesthesia used?: No    General anesthesia used?: Yes    Sedation type: Moderate (conscious) sedation (See separate Procedural Sedation form)  Manipulation performed?: Yes    Skin traction used?: Yes    Skeletal traction used?: Yes    Reduction successful?: Yes    Confirmation: Reduction confirmed by x-ray    Immobilization:  Other (comment)  Splint type:  Short leg  Supplies used:  Cotton padding, elastic bandage and Ortho-Glass  Neurovascular status: Neurovascularly intact    Distal perfusion: normal    Neurological function: normal    Range of motion: normal    Patient tolerance:  Patient tolerated the procedure well with no immediate complications   Patient was sedated with ketamine is noted, again skin was intact, the knee and hip was flexed using , traction was pulled on the foot gentle anterior pressure was applied with immediate relocation, again steady skeletal traction and dorsiflexion was applied for presumed anatomic alignment, skin was intact immediately prior to wrapping the leg with extensive cotton padding, subsequently splinted by myself with the assistance of technician and nursing and right Ortho Glass stirrup splint followed by a right posterior short-leg splint with Ortho Glass    The splint was applied by myself, patient had 2+ pulses cap refill is brisk able to wiggle the toes sensation intact after procedure      Conscious Sedation Assessment      Classification Score   ASA Scale Assessment  1-Healthy patient, no disease outside surgical process filed at 09/23/2018 1913           Phone Contacts  ED Phone Contact    ED Course  ED Course as of Sep 24 0122   Christophereduin Toan Sep 23, 2018   1823 Closed deformity to right ankle a/distal leg, she has 2+ pulses sensations intact    1900 Patient has closed fracture dislocation of her right ankle, again 2+ pulses sensations grossly intact motor is intact distally although limited secondary to pain discussed risks benefits alternatives to sedation patient wishes to be out for prolonged period of time, will attempt ketamine sedation, understands that if unsuccessful, may need transfer to [facility] or discussion with on-call orthopedics, may require transfer anyway for unstable ankle fracture dislocation    1940 Successful reduction improved alignment toes are pink able to wiggle the toes sedation time and now splinted by myself Orthopedics thais    1955 Reviewed closed fracture dislocation right ankle neurovascularly intact pre and postprocedure, improved alignment, patient's pain is controlled, ok to discharge as an outpatient    2003 Sodium: (!) 125   2010 Sodium: 140   2109 Patient seen and re-evaluated multiple times she is back to baseline her pain is controlled she is able wiggle all of her toe she has brisk cap refill sensations intact, she has a bounding dorsalis pedis pulse palpated underneath of the Ace bandages, offered admission for ambulatory dysfunction she has a walker wheelchair and crutches at home after discussion patient wishes to go home in care of family nonweightbearing early follow-up with orthopedics very close return instructions and precautions patient family agreeable plan understands nonweightbearing will likely require surgery    2205 Patient again back to baseline she is able to ambulate safely, her pain is controlled will give Zofran she requests tramadol, they have her orthopedic surgeon they wish to follow up with will call the morning to be seen the next day or 2 for planned surgery very close return instructions patient family understand and agree to plan she is improved and feels well at this time and comfortable going home, family agreeable                                MDM  Number of Diagnoses or Management Options  Closed fracture dislocation of right ankle, initial encounter:   Closed trimalleolar fracture of right ankle, initial encounter:   Fall, initial encounter:   Diagnosis management comments: 18-year-old female with mechanical fall/injury to her right ankle, did not strike her head or lose consciousness has significant pain localized to her right ankle, no other injuries she has some tingling in the toes but no true weakness paresthesias or anesthesia she is unable to bear weight on exam here she is afebrile normal vital signs she appears uncomfortable her complete head-to-toe exam concerning for possible closed dislocation versus fracture of her right ankle, the foot is neurovascularly intact, will place IV, preoperative labs, will start with pain control stat x-ray of her leg and ankle, disposition pending further evaluation reassessment    CritCare Time    Disposition  Final diagnoses:   Closed fracture dislocation of right ankle, initial encounter   Closed trimalleolar fracture of right ankle, initial encounter   Fall, initial encounter     Time reflects when diagnosis was documented in both MDM as applicable and the Disposition within this note     Time User Action Codes Description Comment    9/23/2018 10:10 PM Errol President Add [L81 [id number]] Closed fracture dislocation of right ankle, initial encounter     9/23/2018 10:10 PM Errol President Add [K34 [id number]] Closed trimalleolar fracture of right ankle, initial encounter     9/23/2018 10:10 PM Errol President Add [W19  XXXA] Fall, initial encounter       ED Disposition     ED Disposition Condition Comment    Discharge  Kaleigh CHRISTINE Hernandez discharge to home/self care      Condition at discharge: Good        Follow-up Information     Follow up With Specialties Details Why Contact Info Additional Information    [address] [facility] Emergency Department Emergency Medicine  If symptoms worsen [address]  [phone] [place] ED, [address] [place], [place], [address] [facility] Orthopedic Surgery In 1 day Please call the orthopedic surgeons, for your orthopedic surgeon tomorrow please tell them that you broke her ankle as described asked the your to be seen the next day or 2 will likely need surgery as instructed [address]  [place] [address]  [phone]           Discharge Medication List as of 9/23/2018 10:15 PM      START taking these medications    Details   ondansetron (ZOFRAN-ODT) 4 mg disintegrating tablet Take 1 tablet (4 mg total) by mouth every 8 (eight) hours as needed for nausea for up to 3 days, Starting Sun 9/23/2018, Until Wed 9/26/2018, Print      traMADol (ULTRAM) 50 mg tablet Take 1 tablet (50 mg total) by mouth every 6 (six) hours as needed for severe pain for up to 4 days, Starting Sun 9/23/2018, Until Thu 9/27/2018, Print         CONTINUE these medications which have NOT CHANGED    Details   acetaZOLAMIDE (DIAMOX) 250 mg tablet TAKE 2 TABLET TWICE DAILY, Normal      BD PEN NEEDLE ANDREA U/F 32G X 4 MM MISC ONCE DAILY, Historical Med      Cholecalciferol (VITAMIN D) 2000 units CAPS Take 1 tablet by mouth 2 (two) times a day, Historical Med      gabapentin (NEURONTIN) 100 mg capsule TAKE 2 CAPSULES BY MOUTH TWICE DAILY, Normal      levothyroxine 88 mcg tablet Take 1 tablet by mouth daily, Historical Med      montelukast (SINGULAIR) 10 mg tablet Take 10 mg by mouth daily, Starting Mon 1/8/2018, Historical Med      Multiple Vitamin (MULTI-VITAMIN DAILY) TABS Take 1 tablet by mouth daily, Historical Med      pantoprazole (PROTONIX) 40 mg tablet Take 1 tablet by mouth daily, Historical Med      phentermine (ADIPEX-P) 37 5 MG tablet Take 37 5 mg by mouth daily, Starting Thu 3/8/2018, Historical Med      SAXENDA 18 MG/3ML SOPN ADMINISTER HALF OF A ML (3 MG) SUBCUTANEOUSLY DAILY, Historical Med      venlafaxine (EFFEXOR-XR) 150 mg 24 hr capsule Take 150 mg by mouth daily, Historical Med      ZOLMitriptan (ZOMIG) 5 MG tablet Take 1 tablet by mouth, Starting Mon 12/7/2015, Historical Med nitrofurantoin (MACROBID) 100 mg capsule TAKE ONE CAPSULE BY MOUTH TWICE A DAY FOR 5 DAYS, Historical Med      mirtazapine (REMERON) 7 5 MG tablet Take 1 tablet (7 5 mg total) by mouth daily at bedtime for 30 days, Starting Mon 3/26/2018, Until Wed 4/25/2018, Normal      fluconazole (DIFLUCAN) 150 mg tablet TAKE 1 TABLET BY MOUTH ONCE A DAY FOR 1 DOSE, Historical Med           No discharge procedures on file      ED Provider  Electronically Signed by           Pipe Patel,   09/24/18 9097

## 2018-09-24 VITALS
HEART RATE: 87 BPM | RESPIRATION RATE: 18 BRPM | OXYGEN SATURATION: 98 % | TEMPERATURE: 97.6 F | DIASTOLIC BLOOD PRESSURE: 68 MMHG | SYSTOLIC BLOOD PRESSURE: 124 MMHG | BODY MASS INDEX: 39.27 KG/M2 | WEIGHT: 201.06 LBS

## 2018-09-24 NOTE — DISCHARGE INSTRUCTIONS
Your to remain nonweightbearing stay off of ear ankle as instructed please return to the emergency department if your toes or blue numb you can feel them he can wiggle her toes you have severe pain not controlled at home you worsening or other concerning symptoms otherwise again call her surgeon tomorrow morning asked to be seen in the next day or 2 for likely surgery a week please return in the meantime if you have any concerns as instructed    Ankle Fracture   WHAT YOU NEED TO KNOW:   An ankle fracture is a break in 1 or more of the bones in your ankle  DISCHARGE INSTRUCTIONS:   Call 911 for any of the following:   · You feel lightheaded, short of breath, and have chest pain  · You cough up blood  Return to the emergency department if:   · Your leg feels warm, tender, and painful  It may look swollen and red  · Blood soaks through your bandage  · You have severe pain in your ankle  · Your cast feels too tight  · Your foot or toes are cold or numb  · Your foot or toenails turn blue or gray  Contact your healthcare provider if:   · Your splint feels too tight  · Your swelling has increased or returned  · You have a fever  · Your pain does not go away, even after treatment  · You have questions or concerns about your condition or care  Medicines: You may need any of the following:  · Acetaminophen  decreases pain and fever  It is available without a doctor's order  Ask how much to take and how often to take it  Follow directions  Acetaminophen can cause liver damage if not taken correctly  · NSAIDs , such as ibuprofen, help decrease swelling, pain, and fever  This medicine is available with or without a doctor's order  NSAIDs can cause stomach bleeding or kidney problems in certain people  If you take blood thinner medicine, always ask your healthcare provider if NSAIDs are safe for you  Always read the medicine label and follow directions      · Prescription pain medicine  may be given  Ask your healthcare provider how to take this medicine safely  · Take your medicine as directed  Contact your healthcare provider if you think your medicine is not helping or if you have side effects  Tell him or her if you are allergic to any medicine  Keep a list of the medicines, vitamins, and herbs you take  Include the amounts, and when and why you take them  Bring the list or the pill bottles to follow-up visits  Carry your medicine list with you in case of an emergency  Follow up with your healthcare provider in 1 to 2 days: Your fracture may need to be reduced (bones pushed back into place) or you may need surgery  Write down your questions so you remember to ask them during your visits  Support devices: You will be given a brace, cast, or splint to limit your movement and protect your ankle  You may need to use crutches to protect your ankle and decrease your pain as you move around  Do not remove your device and do not put weight on your injured ankle  Splint and cast care:  Cover the splint or cast before you bathe so it does not get wet  Tape 2 plastic trash bags to your skin above the cast  Try to keep your ankle out of the water as much as possible  Rest:  Rest your ankle so that it can heal  Return to normal activities as directed  Ice:  Apply ice on your ankle for 15 to 20 minutes every hour or as directed  Use an ice pack, or put crushed ice in a plastic bag  Cover it with a towel  Ice helps prevent tissue damage and decreases swelling and pain  Elevate:  Elevate your ankle above the level of your heart as often as you can  This will help decrease swelling and pain  Prop your ankle on pillows or blankets to keep it elevated comfortably  © 2017 [address] [facility] Information is for End User's use only and may not be sold, redistributed or otherwise used for commercial purposes   All illustrations and images included in CareNotes® are the copyrighted property of [employer]  or Josh Singh  The above information is an  only  It is not intended as medical advice for individual conditions or treatments  Talk to your doctor, nurse or pharmacist before following any medical regimen to see if it is safe and effective for you  Ankle Dislocation   WHAT YOU NEED TO KNOW:   Ankle dislocation is when the bones in your ankle joint move out of place  A joint is an area where bones meet  You may also have an ankle fracture (break in the bone)  An ankle dislocation and fracture may need surgery  DISCHARGE INSTRUCTIONS:   Medicines:   · Prescription pain medicine  may be given  Ask your healthcare provider how to take this medicine safely  · Take your medicine as directed  Contact your healthcare provider if you think your medicine is not helping or if you have side effects  Tell him or her if you are allergic to any medicine  Keep a list of the medicines, vitamins, and herbs you take  Include the amounts, and when and why you take them  Bring the list or the pill bottles to follow-up visits  Carry your medicine list with you in case of an emergency  Follow up with your healthcare provider or bone specialist within 2 days:  Write down your questions so you remember to ask them during your visits  Rest your ankle: You will need to rest your ankle for 6 weeks after your injury  Do not put pressure on your ankle for long periods of time  This will help keep your ankle safe from further damage, and help it heal faster  Ice your ankle:  Apply ice on your foot for 15 to 20 minutes every hour or as directed  Use an ice pack, or put crushed ice in a plastic bag  Cover it with a towel  Ice helps prevent tissue damage and decreases swelling and pain  Compress your ankle:   · You may need to wrap an elastic bandage around your ankle  An ankle wrap will compress (put pressure on) your ankle to help decrease swelling   Compression also helps support your ankle, and allows it to heal  Wear your ankle wrap for as long as directed  Ask for instructions about how to wrap your ankle  · You may also need a brace, short leg cast, or splint to help protect your ankle  A splint is a type of brace that keeps your ankle stable  Ask how to care for your brace, cast, or splint  Elevate your ankle:  Elevate your ankle above the level of your heart as often as you can  This will help decrease swelling and pain  Prop your ankle on pillows or blankets to keep it elevated comfortably  Activity:  Ask your healthcare provider how active you should be, and when you may return to your normal daily activities  Movement and activity are helpful for healing  After 6 weeks, practice walking as directed  Use crutches as directed: You may need crutches to help you walk while your ankle heals  Crutches help you keep your weight off your ankle, and help prevent more ankle damage  Physical therapy:  A physical therapist teaches you exercises to help improve movement and strength, and to decrease pain  Contact your healthcare provider or bone specialist if:   · You have trouble walking, or more swelling, pain, or stiffness in your ankle, even after you take your medicine  · You have questions or concerns about your condition or care  Return to the emergency department if:   · The skin around your ankle begins to feel hot, tight, or is shiny or pale  · Your cast or splint feels too tight  · Your ankle, foot, or toes feel numb  © 2017 [address] Information is for End User's use only and may not be sold, redistributed or otherwise used for commercial purposes  All illustrations and images included in CareNotes® are the copyrighted property of [employer]  or Josh Singh  The above information is an  only  It is not intended as medical advice for individual conditions or treatments   Talk to your doctor, nurse or pharmacist before following any medical regimen to see if it is safe and effective for you

## 2019-03-14 ENCOUNTER — OFFICE VISIT (OUTPATIENT)
Dept: GASTROENTEROLOGY | Facility: CLINIC | Age: 39
End: 2019-03-14
Payer: COMMERCIAL

## 2019-03-14 VITALS
SYSTOLIC BLOOD PRESSURE: 118 MMHG | BODY MASS INDEX: 40.9 KG/M2 | HEART RATE: 78 BPM | DIASTOLIC BLOOD PRESSURE: 80 MMHG | WEIGHT: 209.4 LBS

## 2019-03-14 DIAGNOSIS — K59.04 CHRONIC IDIOPATHIC CONSTIPATION: Primary | ICD-10-CM

## 2019-03-14 PROCEDURE — 99204 OFFICE O/P NEW MOD 45 MIN: CPT | Performed by: PHYSICIAN ASSISTANT

## 2019-03-14 RX ORDER — LOSARTAN POTASSIUM 50 MG/1
TABLET ORAL
COMMUNITY
End: 2019-09-17 | Stop reason: ALTCHOICE

## 2019-03-14 RX ORDER — GABAPENTIN 300 MG/1
CAPSULE ORAL
COMMUNITY
Start: 2017-03-02 | End: 2019-03-14

## 2019-03-14 RX ORDER — OFLOXACIN 3 MG/ML
1 SOLUTION/ DROPS OPHTHALMIC
COMMUNITY
Start: 2015-07-25 | End: 2019-10-11 | Stop reason: ALTCHOICE

## 2019-03-14 RX ORDER — ALBUTEROL SULFATE 90 UG/1
AEROSOL, METERED RESPIRATORY (INHALATION)
COMMUNITY
End: 2019-03-14

## 2019-03-14 RX ORDER — SUMATRIPTAN 50 MG/1
TABLET, FILM COATED ORAL
COMMUNITY
End: 2019-09-17 | Stop reason: ALTCHOICE

## 2019-03-14 RX ORDER — KETOROLAC TROMETHAMINE 10 MG/1
TABLET, FILM COATED ORAL
COMMUNITY
End: 2019-09-17 | Stop reason: ALTCHOICE

## 2019-03-14 RX ORDER — LEVOTHYROXINE SODIUM 0.1 MG/1
100 TABLET ORAL
COMMUNITY
Start: 2015-05-21 | End: 2019-09-17 | Stop reason: ALTCHOICE

## 2019-03-14 RX ORDER — CANAGLIFLOZIN 300 MG/1
TABLET, FILM COATED ORAL
Refills: 3 | COMMUNITY
Start: 2019-03-07 | End: 2019-09-17 | Stop reason: ALTCHOICE

## 2019-03-14 RX ORDER — BUPROPION HYDROCHLORIDE 150 MG/1
TABLET ORAL
COMMUNITY
End: 2019-03-14

## 2019-03-14 RX ORDER — CLOTRIMAZOLE AND BETAMETHASONE DIPROPIONATE 10; .64 MG/G; MG/G
CREAM TOPICAL
COMMUNITY
End: 2019-03-14

## 2019-03-14 RX ORDER — CIPROFLOXACIN 250 MG/1
TABLET, FILM COATED ORAL
COMMUNITY
End: 2019-03-14

## 2019-03-14 RX ORDER — AZELASTINE HYDROCHLORIDE, FLUTICASONE PROPIONATE 137; 50 UG/1; UG/1
SPRAY, METERED NASAL
COMMUNITY
Start: 2017-04-04

## 2019-03-14 RX ORDER — ATORVASTATIN CALCIUM 20 MG/1
TABLET, FILM COATED ORAL
COMMUNITY
Start: 2018-12-02

## 2019-03-14 RX ORDER — FLUCONAZOLE 150 MG/1
TABLET ORAL
COMMUNITY
End: 2019-03-14

## 2019-03-14 RX ORDER — VENLAFAXINE HYDROCHLORIDE 75 MG/1
CAPSULE, EXTENDED RELEASE ORAL
Refills: 3 | COMMUNITY
Start: 2019-02-22 | End: 2019-09-17 | Stop reason: SDUPTHER

## 2019-03-14 RX ORDER — ESCITALOPRAM OXALATE 10 MG/1
TABLET ORAL
COMMUNITY
End: 2019-03-14

## 2019-03-14 RX ORDER — DOCUSATE SODIUM 100 MG/1
100 CAPSULE, LIQUID FILLED ORAL
COMMUNITY
Start: 2015-09-28 | End: 2019-03-14

## 2019-03-14 RX ORDER — LEVOFLOXACIN 500 MG/1
TABLET, FILM COATED ORAL
COMMUNITY
End: 2019-03-14

## 2019-03-14 RX ORDER — VENLAFAXINE 75 MG/1
TABLET ORAL
COMMUNITY

## 2019-03-14 RX ORDER — ESCITALOPRAM OXALATE 20 MG/1
TABLET ORAL
COMMUNITY
End: 2019-03-14

## 2019-03-14 RX ORDER — ARIPIPRAZOLE 2 MG/1
2 TABLET ORAL
COMMUNITY
End: 2019-09-17 | Stop reason: ALTCHOICE

## 2019-03-14 RX ORDER — ERGOCALCIFEROL (VITAMIN D2) 1250 MCG
CAPSULE ORAL
COMMUNITY
Start: 2019-02-21 | End: 2019-09-17 | Stop reason: SDUPTHER

## 2019-03-14 RX ORDER — LINACLOTIDE 72 UG/1
1 CAPSULE, GELATIN COATED ORAL DAILY
Refills: 5 | COMMUNITY
Start: 2019-02-28 | End: 2019-04-16

## 2019-03-14 RX ORDER — TRAMADOL HYDROCHLORIDE 50 MG/1
TABLET ORAL
COMMUNITY
End: 2019-09-17 | Stop reason: ALTCHOICE

## 2019-03-14 RX ORDER — MELOXICAM 15 MG/1
TABLET ORAL
COMMUNITY
End: 2019-09-17 | Stop reason: ALTCHOICE

## 2019-03-14 RX ORDER — TERCONAZOLE 80 MG/1
SUPPOSITORY VAGINAL
COMMUNITY
End: 2019-09-17 | Stop reason: ALTCHOICE

## 2019-03-14 RX ORDER — TOPIRAMATE 50 MG/1
TABLET, FILM COATED ORAL
COMMUNITY
End: 2019-09-17 | Stop reason: ALTCHOICE

## 2019-03-14 RX ORDER — IBUPROFEN 800 MG/1
TABLET ORAL
COMMUNITY
End: 2019-03-14

## 2019-03-14 NOTE — PROGRESS NOTES
[facility] - Outpatient Follow-up Note  Fei Winn 43 y o  female MRN: [id number]  Encounter: [id number]          ASSESSMENT AND PLAN:      1  Chronic idiopathic constipation  Failed Linzess  Will try Trulance  Discussed high fiber diet, exercise and 64oz water daily (she admits she has to watch her water intake due to pseudotumor cerebri)    2  Epigastric pain  ? Due to constipation or GERD  If not improving with Trulance will add Protonix  ______________________________________________________________________    SUBJECTIVE:  43year old female presents with complaints of constipation  This has been problematic for only about 6 months - started after she suffered an ankle fracture  She was recently put on Linzess 72mcg which did not help  She increased this to 145mcg which has not helped either  She is relying on laxatives 1-2 times per week in order to force a BM  She admits to associated bloating, cramping and upper abdominal burning with nausea  She denies rectal bleeding or weight loss  She had an EGD and colonoscopy in 2018 which were normal        REVIEW OF SYSTEMS IS OTHERWISE NEGATIVE        Historical Information   Past Medical History:   Diagnosis Date    Anxiety     Headache     Hypothyroidism     IBS (irritable bowel syndrome)     Insulin resistance     Occipital neuralgia     Papilledema     Pseudotumor cerebri     Tremor      Past Surgical History:   Procedure Laterality Date    ANKLE FRACTURE SURGERY       SECTION      CHOLECYSTECTOMY      CHOLESTEATOMA EXCISION      DILATION AND CURETTAGE OF UTERUS      ENDOMETRIAL BIOPSY      HYSTERECTOMY      TONSILLECTOMY AND ADENOIDECTOMY      UMBILICAL HERNIA REPAIR       Social History   Social History     Substance and Sexual Activity   Alcohol Use Yes    Comment: social     Social History     Substance and Sexual Activity   Drug Use No     Social History     Tobacco Use   Smoking Status Never Smoker   Smokeless Tobacco Never Used     Family History   Problem Relation Age of Onset    Hypertension Mother     Hypothyroidism Mother     Hypertension Father     Hypothyroidism Father     Insulin resistance Father     Hypothyroidism Sister        Meds/Allergies       Current Outpatient Medications:     acetaZOLAMIDE (DIAMOX) 250 mg tablet    Albuterol Sulfate (VENTOLIN HFA IN)    ARIPiprazole (ABILIFY) 2 mg tablet    atorvastatin (LIPITOR) 20 mg tablet    Azelastine-Fluticasone 137-50 MCG/ACT SUSP    ergocalciferol (ERGOCALCIFEROL) 61757 units capsule    Insulin Pen Needle 32G X 6 MM MISC    INVOKANA 300 MG TABS    levothyroxine 88 mcg tablet    Liraglutide (VICTOZA SC)    meloxicam (MOBIC) 15 mg tablet    mometasone-formoterol (DULERA) 100-5 MCG/ACT inhaler    montelukast (SINGULAIR) 10 mg tablet    Multiple Vitamin (MULTI-VITAMIN DAILY) TABS    ofloxacin (OCUFLOX) 0 3 % ophthalmic solution    phentermine (ADIPEX-P) 37 5 MG tablet    PROBIOTIC PRODUCT PO    venlafaxine (EFFEXOR) 75 mg tablet    venlafaxine (EFFEXOR-XR) 150 mg 24 hr capsule    BD PEN NEEDLE ANDREA U/F 32G X 4 MM MISC    Cholecalciferol (VITAMIN D) 2000 units CAPS    Cholecalciferol 1000 units capsule    Exenatide (BYETTA 10 MCG PEN) 10 MCG/0 04ML SOPN    ketorolac (TORADOL) 10 mg tablet    levothyroxine 100 mcg tablet    Levothyroxine Sodium (SYNTHROID PO)    LINZESS 72 MCG CAPS    losartan (COZAAR) 50 mg tablet    mirtazapine (REMERON) 7 5 MG tablet    ondansetron (ZOFRAN-ODT) 4 mg disintegrating tablet    pantoprazole (PROTONIX) 40 mg tablet    SAXENDA 18 MG/3ML SOPN    SUMAtriptan (IMITREX) 50 mg tablet    terconazole (TERAZOL 3) 80 MG vaginal suppository    topiramate (TOPAMAX) 50 MG tablet    traMADol (ULTRAM) 50 mg tablet    venlafaxine (EFFEXOR-XR) 75 mg 24 hr capsule    Venlafaxine HCl (EFFEXOR XR PO)    ZOLMitriptan (ZOMIG) 5 MG tablet    Allergies   Allergen Reactions    Amoxicillin-Pot Clavulanate GI Intolerance    Cefdinir GI Intolerance    Cephalosporins GI Intolerance    Erythromycin GI Intolerance    Fentanyl Other (See Comments)     PT STATES DOES NOT WANT FENTANYL  Other reaction(s): Nausea and/or vomiting  PT STATES DOES NOT WANT FENTANYL      Metformin Diarrhea    Morphine GI Intolerance    Pollen Extract     Sulfa Antibiotics Other (See Comments)     Unknown, pt was told to stay away from sulfa since child    Sulfasalazine      has ibs and vomits  has ibs and vomits      Sulfate Other (See Comments)           Objective     Blood pressure 118/80, pulse 78, weight 95 kg (209 lb 6 4 oz)  Body mass index is 40 9 kg/m²  PHYSICAL EXAM:      General Appearance:   Alert, cooperative, no distress   HEENT:   Normocephalic, atraumatic, anicteric      Neck:  Supple, symmetrical, trachea midline   Lungs:   Clear to auscultation bilaterally; no rales, rhonchi or wheezing; respirations unlabored    Heart[de-identified]   Regular rate and rhythm; no murmur, rub, or gallop  Abdomen:   Soft, non-tender, non-distended; normal bowel sounds; no masses, no organomegaly    Genitalia:   Deferred    Rectal:   Deferred    Extremities:  No cyanosis, clubbing or edema    Pulses:  2+ and symmetric    Skin:  No jaundice, rashes, or lesions    Lymph nodes:  No palpable cervical lymphadenopathy        Lab Results:   No visits with results within 1 Day(s) from this visit     Latest known visit with results is:   Admission on 09/23/2018, Discharged on 09/23/2018   Component Date Value    WBC 09/23/2018 12 17*    RBC 09/23/2018 4 71     Hemoglobin 09/23/2018 13 3     Hematocrit 09/23/2018 42 2     MCV 09/23/2018 90     MCH 09/23/2018 28 2     MCHC 09/23/2018 31 5     RDW 09/23/2018 13 6     MPV 09/23/2018 9 8     Platelets 74/94/5016 317     nRBC 09/23/2018 0     Neutrophils Relative 09/23/2018 68     Immat GRANS % 09/23/2018 0     Lymphocytes Relative 09/23/2018 25     Monocytes Relative 09/23/2018 5  Eosinophils Relative 09/23/2018 2     Basophils Relative 09/23/2018 0     Neutrophils Absolute 09/23/2018 8 09*    Immature Grans Absolute 09/23/2018 0 05     Lymphocytes Absolute 09/23/2018 3 08     Monocytes Absolute 09/23/2018 0 63     Eosinophils Absolute 09/23/2018 0 27     Basophils Absolute 09/23/2018 0 05     Protime 09/23/2018 13 2     INR 09/23/2018 1 01     PTT 09/23/2018 25     Sodium 09/23/2018 125*    Potassium 09/23/2018 2 9*    Chloride 09/23/2018 96*    CO2 09/23/2018 18*    ANION GAP 09/23/2018 11     BUN 09/23/2018 10     Creatinine 09/23/2018 0 85     Glucose 09/23/2018 112     Calcium 09/23/2018 8 3     eGFR 09/23/2018 87     ABO Grouping 09/23/2018 O     Rh Factor 09/23/2018 Positive     Antibody Screen 09/23/2018 Negative     Specimen Expiration Date 09/23/2018 [id number]     Sodium 09/23/2018 140     Potassium 09/23/2018 4 1     Chloride 09/23/2018 110*    CO2 09/23/2018 20*    ANION GAP 09/23/2018 10     BUN 09/23/2018 10     Creatinine 09/23/2018 0 64     Glucose 09/23/2018 95     Calcium 09/23/2018 7 8*    eGFR 09/23/2018 113          Radiology Results:   No results found

## 2019-03-14 NOTE — LETTER
March 14, 2019     Rd Patino DO  [address]    Patient: Rola Pruitt   YOB: 1980   Date of Visit: 3/14/2019       Dear Dr Sigala Au: Thank you for referring Jessica Tee to me for evaluation  Below are my notes for this consultation  If you have questions, please do not hesitate to call me  I look forward to following your patient along with you  Sincerely,        Mika Mejia PA-C        CC: No Recipients  Kg Martinez  3/14/2019  2:59 PM  Sign at close encounter  [facility] - Outpatient Follow-up Note  Rola Pruitt 43 y o  female MRN: [id number]  Encounter: [id number]          ASSESSMENT AND PLAN:      1  Chronic idiopathic constipation  Failed Linzess  Will try Trulance  Discussed high fiber diet, exercise and 64oz water daily (she admits she has to watch her water intake due to pseudotumor cerebri)    2  Epigastric pain  ? Due to constipation or GERD  If not improving with Trulance will add Protonix  ______________________________________________________________________    SUBJECTIVE:  43year old female presents with complaints of constipation  This has been problematic for only about 6 months - started after she suffered an ankle fracture  She was recently put on Linzess 72mcg which did not help  She increased this to 145mcg which has not helped either  She is relying on laxatives 1-2 times per week in order to force a BM  She admits to associated bloating, cramping and upper abdominal burning with nausea  She denies rectal bleeding or weight loss  She had an EGD and colonoscopy in 2018 which were normal        REVIEW OF SYSTEMS IS OTHERWISE NEGATIVE        Historical Information   Past Medical History:   Diagnosis Date    Anxiety     Headache     Hypothyroidism     IBS (irritable bowel syndrome)     Insulin resistance     Occipital neuralgia     Papilledema     Pseudotumor cerebri     Tremor Past Surgical History:   Procedure Laterality Date    ANKLE FRACTURE SURGERY       SECTION      CHOLECYSTECTOMY      CHOLESTEATOMA EXCISION      DILATION AND CURETTAGE OF UTERUS      ENDOMETRIAL BIOPSY      HYSTERECTOMY      TONSILLECTOMY AND ADENOIDECTOMY      UMBILICAL HERNIA REPAIR       Social History   Social History     Substance and Sexual Activity   Alcohol Use Yes    Comment: social     Social History     Substance and Sexual Activity   Drug Use No     Social History     Tobacco Use   Smoking Status Never Smoker   Smokeless Tobacco Never Used     Family History   Problem Relation Age of Onset    Hypertension Mother     Hypothyroidism Mother     Hypertension Father     Hypothyroidism Father     Insulin resistance Father     Hypothyroidism Sister        Meds/Allergies       Current Outpatient Medications:     acetaZOLAMIDE (DIAMOX) 250 mg tablet    Albuterol Sulfate (VENTOLIN HFA IN)    ARIPiprazole (ABILIFY) 2 mg tablet    atorvastatin (LIPITOR) 20 mg tablet    Azelastine-Fluticasone 137-50 MCG/ACT SUSP    ergocalciferol (ERGOCALCIFEROL) 64198 units capsule    Insulin Pen Needle 32G X 6 MM MISC    INVOKANA 300 MG TABS    levothyroxine 88 mcg tablet    Liraglutide (VICTOZA SC)    meloxicam (MOBIC) 15 mg tablet    mometasone-formoterol (DULERA) 100-5 MCG/ACT inhaler    montelukast (SINGULAIR) 10 mg tablet    Multiple Vitamin (MULTI-VITAMIN DAILY) TABS    ofloxacin (OCUFLOX) 0 3 % ophthalmic solution    phentermine (ADIPEX-P) 37 5 MG tablet    PROBIOTIC PRODUCT PO    venlafaxine (EFFEXOR) 75 mg tablet    venlafaxine (EFFEXOR-XR) 150 mg 24 hr capsule    BD PEN NEEDLE ANDREA U/F 32G X 4 MM MISC    Cholecalciferol (VITAMIN D) 2000 units CAPS    Cholecalciferol 1000 units capsule    Exenatide (BYETTA 10 MCG PEN) 10 MCG/0 04ML SOPN    ketorolac (TORADOL) 10 mg tablet    levothyroxine 100 mcg tablet    Levothyroxine Sodium (SYNTHROID PO)    LINZESS 72 MCG CAPS   losartan (COZAAR) 50 mg tablet    mirtazapine (REMERON) 7 5 MG tablet    ondansetron (ZOFRAN-ODT) 4 mg disintegrating tablet    pantoprazole (PROTONIX) 40 mg tablet    SAXENDA 18 MG/3ML SOPN    SUMAtriptan (IMITREX) 50 mg tablet    terconazole (TERAZOL 3) 80 MG vaginal suppository    topiramate (TOPAMAX) 50 MG tablet    traMADol (ULTRAM) 50 mg tablet    venlafaxine (EFFEXOR-XR) 75 mg 24 hr capsule    Venlafaxine HCl (EFFEXOR XR PO)    ZOLMitriptan (ZOMIG) 5 MG tablet    Allergies   Allergen Reactions    Amoxicillin-Pot Clavulanate GI Intolerance    Cefdinir GI Intolerance    Cephalosporins GI Intolerance    Erythromycin GI Intolerance    Fentanyl Other (See Comments)     PT STATES DOES NOT WANT FENTANYL  Other reaction(s): Nausea and/or vomiting  PT STATES DOES NOT WANT FENTANYL      Metformin Diarrhea    Morphine GI Intolerance    Pollen Extract     Sulfa Antibiotics Other (See Comments)     Unknown, pt was told to stay away from sulfa since child    Sulfasalazine      has ibs and vomits  has ibs and vomits      Sulfate Other (See Comments)           Objective     Blood pressure 118/80, pulse 78, weight 95 kg (209 lb 6 4 oz)  Body mass index is 40 9 kg/m²  PHYSICAL EXAM:      General Appearance:   Alert, cooperative, no distress   HEENT:   Normocephalic, atraumatic, anicteric      Neck:  Supple, symmetrical, trachea midline   Lungs:   Clear to auscultation bilaterally; no rales, rhonchi or wheezing; respirations unlabored    Heart[de-identified]   Regular rate and rhythm; no murmur, rub, or gallop  Abdomen:   Soft, non-tender, non-distended; normal bowel sounds; no masses, no organomegaly    Genitalia:   Deferred    Rectal:   Deferred    Extremities:  No cyanosis, clubbing or edema    Pulses:  2+ and symmetric    Skin:  No jaundice, rashes, or lesions    Lymph nodes:  No palpable cervical lymphadenopathy        Lab Results:   No visits with results within 1 Day(s) from this visit     Latest known visit with results is:   Admission on 09/23/2018, Discharged on 09/23/2018   Component Date Value    WBC 09/23/2018 12 17*    RBC 09/23/2018 4 71     Hemoglobin 09/23/2018 13 3     Hematocrit 09/23/2018 42 2     MCV 09/23/2018 90     MCH 09/23/2018 28 2     MCHC 09/23/2018 31 5     RDW 09/23/2018 13 6     MPV 09/23/2018 9 8     Platelets 70/23/2208 317     nRBC 09/23/2018 0     Neutrophils Relative 09/23/2018 68     Immat GRANS % 09/23/2018 0     Lymphocytes Relative 09/23/2018 25     Monocytes Relative 09/23/2018 5     Eosinophils Relative 09/23/2018 2     Basophils Relative 09/23/2018 0     Neutrophils Absolute 09/23/2018 8 09*    Immature Grans Absolute 09/23/2018 0 05     Lymphocytes Absolute 09/23/2018 3 08     Monocytes Absolute 09/23/2018 0 63     Eosinophils Absolute 09/23/2018 0 27     Basophils Absolute 09/23/2018 0 05     Protime 09/23/2018 13 2     INR 09/23/2018 1 01     PTT 09/23/2018 25     Sodium 09/23/2018 125*    Potassium 09/23/2018 2 9*    Chloride 09/23/2018 96*    CO2 09/23/2018 18*    ANION GAP 09/23/2018 11     BUN 09/23/2018 10     Creatinine 09/23/2018 0 85     Glucose 09/23/2018 112     Calcium 09/23/2018 8 3     eGFR 09/23/2018 87     ABO Grouping 09/23/2018 O     Rh Factor 09/23/2018 Positive     Antibody Screen 09/23/2018 Negative     Specimen Expiration Date 09/23/2018 [id number]     Sodium 09/23/2018 140     Potassium 09/23/2018 4 1     Chloride 09/23/2018 110*    CO2 09/23/2018 20*    ANION GAP 09/23/2018 10     BUN 09/23/2018 10     Creatinine 09/23/2018 0 64     Glucose 09/23/2018 95     Calcium 09/23/2018 7 8*    eGFR 09/23/2018 113          Radiology Results:   No results found

## 2019-03-20 ENCOUNTER — TELEPHONE (OUTPATIENT)
Dept: GASTROENTEROLOGY | Facility: CLINIC | Age: 39
End: 2019-03-20

## 2019-03-20 DIAGNOSIS — K21.9 GASTROESOPHAGEAL REFLUX DISEASE WITHOUT ESOPHAGITIS: Primary | ICD-10-CM

## 2019-03-20 RX ORDER — PANTOPRAZOLE SODIUM 40 MG/1
40 TABLET, DELAYED RELEASE ORAL DAILY
Qty: 30 TABLET | Refills: 3 | Status: SHIPPED | OUTPATIENT
Start: 2019-03-20 | End: 2019-08-06 | Stop reason: SDUPTHER

## 2019-04-16 ENCOUNTER — OFFICE VISIT (OUTPATIENT)
Dept: GASTROENTEROLOGY | Facility: CLINIC | Age: 39
End: 2019-04-16
Payer: COMMERCIAL

## 2019-04-16 VITALS
HEART RATE: 97 BPM | DIASTOLIC BLOOD PRESSURE: 80 MMHG | HEIGHT: 61 IN | SYSTOLIC BLOOD PRESSURE: 108 MMHG | BODY MASS INDEX: 39.12 KG/M2 | WEIGHT: 207.2 LBS

## 2019-04-16 DIAGNOSIS — K59.03 DRUG-INDUCED CONSTIPATION: Primary | ICD-10-CM

## 2019-04-16 PROCEDURE — 99213 OFFICE O/P EST LOW 20 MIN: CPT | Performed by: PHYSICIAN ASSISTANT

## 2019-04-16 RX ORDER — DICYCLOMINE HCL 20 MG
20 TABLET ORAL EVERY 6 HOURS
Qty: 90 TABLET | Refills: 2 | Status: SHIPPED | OUTPATIENT
Start: 2019-04-16 | End: 2020-04-23

## 2019-05-17 RX ORDER — GABAPENTIN 300 MG/1
CAPSULE ORAL
COMMUNITY

## 2019-05-17 RX ORDER — IBUPROFEN 800 MG/1
TABLET ORAL
COMMUNITY

## 2019-05-17 RX ORDER — FLUCONAZOLE 150 MG/1
TABLET ORAL
COMMUNITY
Start: 2019-04-02 | End: 2019-09-17 | Stop reason: ALTCHOICE

## 2019-05-17 RX ORDER — ERGOCALCIFEROL (VITAMIN D2) 1250 MCG
CAPSULE ORAL
COMMUNITY
Start: 2019-02-21 | End: 2019-10-11 | Stop reason: ALTCHOICE

## 2019-05-21 ENCOUNTER — OFFICE VISIT (OUTPATIENT)
Dept: GASTROENTEROLOGY | Facility: CLINIC | Age: 39
End: 2019-05-21
Payer: COMMERCIAL

## 2019-05-21 VITALS
DIASTOLIC BLOOD PRESSURE: 80 MMHG | SYSTOLIC BLOOD PRESSURE: 118 MMHG | HEIGHT: 61 IN | BODY MASS INDEX: 39.04 KG/M2 | WEIGHT: 206.8 LBS | HEART RATE: 80 BPM

## 2019-05-21 DIAGNOSIS — K59.04 CHRONIC IDIOPATHIC CONSTIPATION: Primary | ICD-10-CM

## 2019-05-21 PROCEDURE — 99214 OFFICE O/P EST MOD 30 MIN: CPT | Performed by: PHYSICIAN ASSISTANT

## 2019-05-21 RX ORDER — BREXPIPRAZOLE 0.5 MG/1
0.5 TABLET ORAL DAILY
Refills: 0 | COMMUNITY
Start: 2019-05-01 | End: 2019-09-17 | Stop reason: ALTCHOICE

## 2019-08-06 DIAGNOSIS — K21.9 GASTROESOPHAGEAL REFLUX DISEASE WITHOUT ESOPHAGITIS: ICD-10-CM

## 2019-08-06 RX ORDER — PANTOPRAZOLE SODIUM 40 MG/1
40 TABLET, DELAYED RELEASE ORAL DAILY
Qty: 90 TABLET | Refills: 3 | Status: SHIPPED | OUTPATIENT
Start: 2019-08-06 | End: 2019-10-11 | Stop reason: ALTCHOICE

## 2019-09-13 RX ORDER — DULOXETIN HYDROCHLORIDE 30 MG/1
CAPSULE, DELAYED RELEASE ORAL
Refills: 0 | COMMUNITY
Start: 2019-06-26 | End: 2019-10-11 | Stop reason: ALTCHOICE

## 2019-09-13 RX ORDER — AMOXICILLIN AND CLAVULANATE POTASSIUM 875; 125 MG/1; MG/1
TABLET, FILM COATED ORAL
Refills: 2 | COMMUNITY
Start: 2019-07-29 | End: 2019-09-17 | Stop reason: ALTCHOICE

## 2019-09-13 RX ORDER — DIAZEPAM 5 MG/1
TABLET ORAL
COMMUNITY
End: 2019-09-17 | Stop reason: ALTCHOICE

## 2019-09-13 RX ORDER — METFORMIN HYDROCHLORIDE 500 MG/1
TABLET, EXTENDED RELEASE ORAL
COMMUNITY
Start: 2019-07-19

## 2019-09-13 RX ORDER — MECLIZINE HYDROCHLORIDE 25 MG/1
TABLET ORAL
Refills: 1 | COMMUNITY
Start: 2019-07-09

## 2019-09-13 RX ORDER — DEXTROAMPHETAMINE SACCHARATE, AMPHETAMINE ASPARTATE, DEXTROAMPHETAMINE SULFATE AND AMPHETAMINE SULFATE 2.5; 2.5; 2.5; 2.5 MG/1; MG/1; MG/1; MG/1
10 TABLET ORAL 2 TIMES DAILY
COMMUNITY

## 2019-09-17 ENCOUNTER — CLINICAL SUPPORT (OUTPATIENT)
Dept: BARIATRICS | Facility: CLINIC | Age: 39
End: 2019-09-17

## 2019-09-17 VITALS
WEIGHT: 207.2 LBS | SYSTOLIC BLOOD PRESSURE: 116 MMHG | HEIGHT: 61 IN | RESPIRATION RATE: 18 BRPM | HEART RATE: 83 BPM | BODY MASS INDEX: 39.12 KG/M2 | DIASTOLIC BLOOD PRESSURE: 72 MMHG | TEMPERATURE: 98.9 F

## 2019-09-17 DIAGNOSIS — E66.01 MORBID OBESITY (HCC): Primary | ICD-10-CM

## 2019-09-17 PROCEDURE — RECHECK

## 2019-09-17 NOTE — PROGRESS NOTES
Bariatric Nutrition Assessment Note    Type of surgery  Pre-op program: Pt requires 3 weight checks 2/2 insurance requirements  Surgery Date: TBD  Surgeon: Dr Fleming Dural  43 y o   female    Vitals:    09/17/19 1322   BP: 116/72   Pulse: 83   Resp: 18   Temp: 98 9 °F (37 2 °C)     Weight (last 2 days)     Date/Time   Weight    09/17/19 1421   94 (207 2)    09/17/19 1322   94 (207 2)            Height: 61 inches  Body mass index is 39 15 kg/m²  Weight in (lb) to have BMI = 25: 132 3 lbs  Pre-Op Excess Wt: 74 9 lbs  5% of Initial Weight = 10 4 lbs  Goal Pre-operative Weight (Initial Weight - 5% of Initial Weight) = 196 8 lbs  (BMI 37 2)  Pt is aware that to schedule for surgery, she must lose 1/2 of required 5% weight loss: Scheduling weight = 202 0 lbs  Pt is aware that her final pre-operative weight goal is based on surgeon's individual recommendations  Pt is aware that she cannot gain weight in the pre-operative process      Estimated needs via [address] Equation based on pt's current height, weight, age, and sex:  BMR: 1554 kcal/day  Estimated calorie needs for weight maintenance = 1865 kcal per day (sedentary)  Estimated calorie needs for weight loss = 9529-9539 kcal per day (1-2 lb  wt loss per week - sedentary)  Estimated protein needs = 60-72 grams per day (1 0-1 2 grams/kg IBW)    Weight History   Onset of Obesity: Adult  Family history of obesity: yes - pt's mother and grandparents per pt  In the past 6 months patient has tried the following weight loss attempts and has failed:  Counseling with  MD  High Protein/Low CHO diets (Atkins, Union, etc )  OTC meds/supplements  Self Created Diets (Portion Control, Healthy Food Choices, etc )  Maximum Wt Lost: 50+ lbs  per pt - with Phentermine and Saxenda    Review of History and Medications   Past Medical History:   Diagnosis Date    Acid reflux     Anxiety     Depression     Fibromyalgia  Headache     Hypothyroidism     IBS (irritable bowel syndrome)     Infertility, female     Insulin resistance     Obesity (BMI 30-39  9)     Occipital neuralgia     Papilledema     PCOS (polycystic ovarian syndrome)     Pseudotumor cerebri     Tremor      Past Surgical History:   Procedure Laterality Date    ANKLE FRACTURE SURGERY       SECTION      CHOLECYSTECTOMY      CHOLESTEATOMA EXCISION      DILATION AND CURETTAGE OF UTERUS      ENDOMETRIAL BIOPSY      HYSTERECTOMY      TONSILLECTOMY AND ADENOIDECTOMY      UMBILICAL HERNIA REPAIR       Social History     Socioeconomic History    Marital status: /Civil Union     Spouse name: None    Number of children: None    Years of education: None    Highest education level: None   Occupational History    None   Social Needs    Financial resource strain: None    Food insecurity:     Worry: None     Inability: None    Transportation needs:     Medical: None     Non-medical: None   Tobacco Use    Smoking status: Never Smoker    Smokeless tobacco: Never Used   Substance and Sexual Activity    Alcohol use: Yes     Comment: social    Drug use: No    Sexual activity: None   Lifestyle    Physical activity:     Days per week: None     Minutes per session: None    Stress: None   Relationships    Social connections:     Talks on phone: None     Gets together: None     Attends Hoahaoism service: None     Active member of club or organization: None     Attends meetings of clubs or organizations: None     Relationship status: None    Intimate partner violence:     Fear of current or ex partner: None     Emotionally abused: None     Physically abused: None     Forced sexual activity: None   Other Topics Concern    None   Social History Narrative    None       Current Outpatient Medications:     acetaZOLAMIDE (DIAMOX) 250 mg tablet, TAKE 2 TABLET TWICE DAILY, Disp: 120 tablet, Rfl: 6    Albuterol Sulfate (VENTOLIN HFA IN), Ventolin HFA 90 mcg/actuation aerosol inhaler, Disp: , Rfl:     amphetamine-dextroamphetamine (ADDERALL) 10 mg tablet, Take 10 mg by mouth 2 (two) times a day, Disp: , Rfl:     atorvastatin (LIPITOR) 20 mg tablet, atorvastatin 20 mg tablet, Disp: , Rfl:     Azelastine-Fluticasone 137-50 MCG/ACT SUSP, 2 spray(s) BID prn, Disp: , Rfl:     BD PEN NEEDLE ANDREA U/F 32G X 4 MM MISC, ONCE DAILY, Disp: , Rfl: 4    ergocalciferol (ERGOCALCIFEROL) 75811 units capsule, 1 CAPSULE ONCE A WEEK, Disp: , Rfl:     gabapentin (NEURONTIN) 300 mg capsule, gabapentin 300 mg capsule, Disp: , Rfl:     ibuprofen (MOTRIN) 800 mg tablet, ibuprofen 800 mg tablet, Disp: , Rfl:     Insulin Pen Needle 32G X 6 MM MISC, BD Ultra-Fine Andrea Pen Needle 32 gauge x 5/32", Disp: , Rfl:     levothyroxine 88 mcg tablet, Take 1 tablet by mouth daily, Disp: , Rfl:     meclizine (ANTIVERT) 25 mg tablet, TAKE 1 TABLET BY MOUTH THREE TIMES A DAY AS NEEDED FOR DIZZINESS, Disp: , Rfl: 1    metFORMIN (GLUCOPHAGE-XR) 500 mg 24 hr tablet, TAKE 1 TABLET BY MOUTH TWICE A DAY WITH MEALS, Disp: , Rfl:     montelukast (SINGULAIR) 10 mg tablet, Take 10 mg by mouth daily, Disp: , Rfl: 1    Multiple Vitamin (MULTI-VITAMIN DAILY) TABS, Take 1 tablet by mouth daily, Disp: , Rfl:     NAPROXEN 375 MG TBEC, Take 375 mg by mouth 2 (two) times a day, Disp: , Rfl: 2    ofloxacin (OCUFLOX) 0 3 % ophthalmic solution, Apply 1 drop to eye, Disp: , Rfl:     Probiotic Product (PROBIOTIC-10 PO), Take by mouth, Disp: , Rfl:     venlafaxine (EFFEXOR) 75 mg tablet, venlafaxine 75 mg tablet, Disp: , Rfl:     venlafaxine (EFFEXOR-XR) 150 mg 24 hr capsule, Take 150 mg by mouth daily, Disp: , Rfl:     dicyclomine (BENTYL) 20 mg tablet, Take 1 tablet (20 mg total) by mouth every 6 (six) hours (Patient not taking: Reported on 9/17/2019), Disp: 90 tablet, Rfl: 2    DULoxetine (CYMBALTA) 30 mg delayed release capsule, TAKE 1 CAPSULE BY MOUTH EVERY DAY IN THE MORNING, Disp: , Rfl: 0   lubiprostone (AMITIZA) 24 mcg capsule, Take 1 capsule (24 mcg total) by mouth 2 (two) times a day with meals (Patient not taking: Reported on 9/17/2019), Disp: 60 capsule, Rfl: 2    mirtazapine (REMERON) 7 5 MG tablet, Take 1 tablet (7 5 mg total) by mouth daily at bedtime for 30 days, Disp: 30 tablet, Rfl: 0    ondansetron (ZOFRAN-ODT) 4 mg disintegrating tablet, Take 1 tablet (4 mg total) by mouth every 8 (eight) hours as needed for nausea for up to 3 days, Disp: 12 tablet, Rfl: 0    pantoprazole (PROTONIX) 40 mg tablet, Take 1 tablet (40 mg total) by mouth daily (Patient not taking: Reported on 9/17/2019), Disp: 90 tablet, Rfl: 3    Food Intake and Lifestyle Assessment:   Food Intake Assessment completed via usual diet recall (pt is a poor historian):  Pt reports a very disordered eating pattern  Breakfast: pt states she skips breakfast about 5 days per week - pt states she has breakfast on the weekends  Snack: sometimes nothing; whole cinnamon muffin; pt states she has tried protein shakes in the past    Lunch: sometimes nothing; leftovers from dinner; sandwich with turkey and cheese with funes and rye bread or white bread or white roll with a yogurt on the side  Snack: animal crackers; pretzels; leftovers from lunchtime; Surface [employer] protein bar or granola bar  Dinner: pt states she is very hungry at this meal - macaroni with meatballs; hamburgers/hot dogs; popcorn chicken with Caesar salad  Snack: cottage cheese; ice cream bar    Beverage intake: water, sugar free beverages and decaf coffee/tea  Estimated fluid intake per day: pt states she drinks about 3 bottles of water and 1 cup of diet tea in tea in the morning-- pt states she "always" feels dehydrated  Pt states she is on a fluid restriction with her medications - pt is unaware of this fluid restriction - informed pt that she must find this out and let me know    Protein supplement: none per pt  Estimated protein intake per day: 40-60 grams  Meals eaten away from home: twice per week  Typical meal pattern: 1-2 meals per day and 2-5 snacks per day  Eating Behaviors: Consumption of high calorie/ high fat foods, Large portion sizes and Frequent snacking/ grazing  Cultural or Bahai considerations: n/a  Food allergies or intolerances: none per pt  Allergies   Allergen Reactions    Amoxicillin-Pot Clavulanate GI Intolerance    Cefdinir GI Intolerance    Cephalosporins GI Intolerance    Erythromycin GI Intolerance    Fentanyl Other (See Comments)     PT STATES DOES NOT WANT FENTANYL  Other reaction(s): Nausea and/or vomiting  PT STATES DOES NOT WANT FENTANYL      Metformin Diarrhea    Morphine GI Intolerance    Pollen Extract     Sulfa Antibiotics Other (See Comments)     Unknown, pt was told to stay away from sulfa since child    Sulfasalazine      has ibs and vomits  has ibs and vomits      Sulfate Other (See Comments)       Physical Assessment  Physical Activity  Types of exercise: Walking  Current physical limitations: pt states she currently has a broken ankle, which she broke on 9/23/18 - pt states she will be having surgery on 10/21/19     Psychosocial Assessment   Support systems: relative(s)  Socioeconomic factors: n/a    Nutrition Diagnosis  Diagnosis: Overweight / Obesity (NC-3 3)  Related to: Excessive energy intake  As Evidenced by: BMI >25     Nutrition Prescription: Recommend the following diet  Low sugar, High protein, Regular     Interventions and Teaching   Discussed pre-op and post-op nutrition guidelines  Patient educated and handouts provided    Surgical changes to stomach / GI  Capacity of post-surgery stomach  Diet progression  Adequate hydration  Sugar and fat restriction to decrease "dumping syndrome"  Weight loss plateaus/ possibility of weight regain  Exercise  Suggestions for pre-op diet  Nutrition considerations after surgery  Protein supplements  Meal planning and preparation  Appropriate carbohydrate, protein, and fat intake, and food/fluid choices to maximize safe weight loss, nutrient intake, and tolerance   Dietary and lifestyle changes  Possible problems with poor eating habits  Intuitive eating  Techniques for self monitoring and keeping daily food journal  Potential for food intolerance after surgery, and ways to deal with them including: lactose intolerance, nausea, reflux, vomiting, diarrhea, food intolerance, appetite changes, gas  Vitamin / Mineral supplementation of Multivitamin with minerals and Vitamin D     Education provided to: patient  Barriers to learning: No barriers identified  Readiness to change: preparation  Prior research on procedure: pre-op class  Comprehension: verbalizes understanding   Expected Compliance: poor    Recommendations  Pt is an appropriate candidate for surgery   Yes     Evaluation / Monitoring  Dietitian to Monitor: Eating pattern as discussed, Tolerance of nutrition prescription, Body weight, Physical activity     Goals:  Eliminate sugar sweetened beverages  Food journal  Exercise 30 minutes 5 times per week  Complete lesson plans 1-6  Eat 3 meals per day  Eliminate mindless snacking     Time Spent:   1 Hour

## 2019-09-17 NOTE — PROGRESS NOTES
Bariatric Behavioral Health Evaluation    Presenting Problem:  Flako Loomis,  : 1980    Is the patient seeking Bariatric Surgery Eval? Yes  If yes how long have you researched this surgery option  Patient has been researching bariatric surgery for approximately 9 months    Realizes Post- Op Requirements? Yes Patient has both family and community members with bariatric surgery    Pre-morbid level of function and history of present illness: Patient experiencing weight related concerns    Psychiatric/Psychological Treatment Diagnosis: Patient admits to Axis I diagnosis of depression and anxiety  Patient takes medication prescribed by her rumatologist    Outpatient Counselor Yes  Has utilized in the past, not currently  utilizing    Psychiatrist No     Have you had Inpatient Treatment?  No    Family Constellation (include relationship with each and Psych/Med HX)    Mother  obesity and Father  obesity    Domestic Violence No    The patient denies both childhood and adult trauma      Additional comments/stressors related to family/relationships/peer support : health, weight, pain, family health, spouse's work schedule    Physical/Psychological Assessment:     Appearance: appropriate  Sociability: average  Affect: appropriate  Mood: calm  Thought Process: coherent  Speech: normal  Content: no impairment  Orientation: person  Yes , place  Yes , time  Yes , normal attention span  Yes , normal memory  Yes   and normal judgement  Yes   Insight: emotional  good    Risk Assessment:     none    Recommendations: Recommended for surgery  yes    Risk of Harm to Self or Others:  Denies SI and HI    Observation:     Interviews this interview only    Access to weapons yes     Weapons secured by :  Under lock and key in safe    Based on the previous information, the client presents the following risk of harm to self or others: low    BARIATRIC SURGERY EDUCATION CHECKLIST    I have received education related to my bariatric surgery process and understand:    Patients may be required to complete a psychiatric evaluation and receive clearance for surgery from their psychiatrist     Patients who undergo weight loss surgery are at higher risk of increased mental health concerns and suicide attempts  Patients may be required to complete a full substance abuse evaluation and then complete all treatment recommendations prior to surgery  If diagnosis of abuse/dependence results, patient may be required to remain sober for one (1) year before having bariatric surgery  Patients on psychiatric medications should check with their provider to discuss psychiatric medications and the changes in absorption  Patient should discuss all time release medications with provider and take all medications as prescribed  The recommendation is that there is no use of  any tobacco products, Hookah or  vapes for the bariatric post-operation patient  Bariatric surgery patients should not consume alcohol as a post-operative patient as it may increase risk of numerous health conditions including but not limited to alcohol abuse and ulcers  There is a possibility of weight regain if patient does not follow all program guidelines and recommendations  Bariatric surgery patients should exercise thirty (30) to sixty (60) minutes per day to maintain post-surgical weight loss  Research indicates that bariatric patients are more successful when they see a therapist for up to two (2) years post-op  Patients will follow all medical and dietary recommendations provided  Patient will keep all scheduled appointments and follow up with their physician for a minimum of five (5) years  Patient will take all vitamins as recommended  Post-operative vitamins are life-long  Patient reviewed Bariatric Surgery Education Checklist and agrees they have received education on these issues       Note:  Patient admits to Axis I diagnosis of depression and anxiety  Patient has a positive family history of obesity  Patient educated regarding the impact of nicotine and alcohol on the post bariatric surgery patient  Patient meets criteria for surgery at this program and is referred to physician

## 2019-09-27 ENCOUNTER — OFFICE VISIT (OUTPATIENT)
Dept: BARIATRICS | Facility: CLINIC | Age: 39
End: 2019-09-27
Payer: COMMERCIAL

## 2019-09-27 ENCOUNTER — PREP FOR PROCEDURE (OUTPATIENT)
Dept: BARIATRICS | Facility: CLINIC | Age: 39
End: 2019-09-27

## 2019-09-27 VITALS
WEIGHT: 205.8 LBS | SYSTOLIC BLOOD PRESSURE: 112 MMHG | BODY MASS INDEX: 38.86 KG/M2 | HEART RATE: 89 BPM | HEIGHT: 61 IN | TEMPERATURE: 99.1 F | DIASTOLIC BLOOD PRESSURE: 80 MMHG

## 2019-09-27 DIAGNOSIS — E66.9 OBESITY, CLASS II, BMI 35-39.9: Primary | ICD-10-CM

## 2019-09-27 DIAGNOSIS — E66.01 MORBID OBESITY (HCC): Primary | ICD-10-CM

## 2019-09-27 PROBLEM — E66.812 OBESITY, CLASS II, BMI 35-39.9: Status: ACTIVE | Noted: 2019-09-27

## 2019-09-27 PROCEDURE — 99204 OFFICE O/P NEW MOD 45 MIN: CPT | Performed by: SURGERY

## 2019-09-27 NOTE — PROGRESS NOTES
BARIATRIC INITIAL CONSULT - BARIATRIC SURGERY    Rico Plasencia 35 y o  female MRN: [id number]  Unit/Bed#:  Encounter: [id number]      HPI:  Rico Plasencia is a 35 y o  female who presents with a longstanding history of morbid obesity and inability to sustain a meaningful weight loss  Here today to discuss bariatric options  She is a  for [employer] mass index is 38 89 kg/m²  ++Suffers from pseudotumor cerebri, fibromyalgia, IBS, hypothyroidism, HLD, hyperTGL, insulin resistance, asthma, ADD, anxiety, constipation  S/p C-sxn x2, R ankle repair, lap irina, robotic DEVAN, UHR, UHR with mesh for recurrence, T&A    PONV with fentanyl (?)    Visit type: initial visit    Symptoms: excess weight, weight increase, inability to loss weight and fatigue    Associated Symptoms: anxiety    Associated Conditions: glucose intolerance, elevated triglycerides and abdominal obesity  Disease Complications: none  Weight Loss Interest: high  Previous Diet Trials: low carb    Exercise Frequency:infrequency  Types of Exercise: walking      Review of Systems   Constitutional: Negative  Respiratory: Negative  Cardiovascular: Negative  Gastrointestinal: Negative  Musculoskeletal: Negative  Neurological: Negative  All other systems reviewed and are negative  Historical Information   Past Medical History:   Diagnosis Date    Acid reflux     Anxiety     Depression     Fibromyalgia     Headache     Hypothyroidism     IBS (irritable bowel syndrome)     Infertility, female     Insulin resistance     Obesity (BMI 30-39  9)     Occipital neuralgia     Papilledema     PCOS (polycystic ovarian syndrome)     Pseudotumor cerebri     Tremor      Past Surgical History:   Procedure Laterality Date    ANKLE FRACTURE SURGERY       SECTION      CHOLECYSTECTOMY      CHOLESTEATOMA EXCISION      DILATION AND CURETTAGE OF UTERUS      ENDOMETRIAL BIOPSY      HYSTERECTOMY  TONSILLECTOMY AND ADENOIDECTOMY      UMBILICAL HERNIA REPAIR       Social History   Social History     Substance and Sexual Activity   Alcohol Use Yes    Comment: social     Social History     Substance and Sexual Activity   Drug Use No     Social History     Tobacco Use   Smoking Status Never Smoker   Smokeless Tobacco Never Used     Family History:   Family History   Problem Relation Age of Onset    Hypertension Mother     Hypothyroidism Mother     Hypertension Father     Hypothyroidism Father     Insulin resistance Father     Hypothyroidism Sister        Meds/Allergies   all medications and allergies reviewed  Allergies   Allergen Reactions    Amoxicillin-Pot Clavulanate GI Intolerance    Cefdinir GI Intolerance    Cephalosporins GI Intolerance    Erythromycin GI Intolerance    Fentanyl Other (See Comments)     PT STATES DOES NOT WANT FENTANYL  Other reaction(s): Nausea and/or vomiting  PT STATES DOES NOT WANT FENTANYL      Metformin Diarrhea    Morphine GI Intolerance    Pollen Extract     Sulfa Antibiotics Other (See Comments)     Unknown, pt was told to stay away from sulfa since child    Sulfasalazine      has ibs and vomits  has ibs and vomits      Sulfate Other (See Comments)       Objective       Current Vitals:   /80 (BP Location: Left arm, Patient Position: Sitting, Cuff Size: Large)   Pulse 89   Temp 99 1 °F (37 3 °C) (Tympanic)   Ht 5' 1" (1 549 m)   Wt 93 4 kg (205 lb 12 8 oz)   BMI 38 89 kg/m²       Physical Exam   Constitutional: She is oriented to person, place, and time  She appears well-developed  HENT:   Head: Normocephalic  Eyes: EOM are normal    Neck: Normal range of motion  Cardiovascular: Normal rate  Pulmonary/Chest: Effort normal    Abdominal: She exhibits no distension  Musculoskeletal: Normal range of motion  Neurological: She is alert and oriented to person, place, and time  Skin: Skin is warm and dry     Psychiatric: She has a normal mood and affect  Her behavior is normal  Judgment and thought content normal        Lab Results: I have personally reviewed pertinent lab results  Imaging: I have personally reviewed pertinent reports  EKG, Pathology, and Other Studies: I have personally reviewed pertinent reports  Assessment/PLAN:    35 y o  yo female with a long standing h/o of obesity and inability to sustain any meaningful weight loss on her own despite several attempts  She is interested in the Laparoscopic Sleeve gastrectomy  I have discussed with the patient that 20-30% of patient's may experience worsened GERD and 18% risk of Mon's esophagitis requiring surveillance EGDs after a sleeve gastrectomy  The patient understands this fully and accepts the risks to undergo a sleeve gastrectomy  ++Suffers from pseudotumor cerebri, fibromyalgia, IBS, hypothyroidism, HLD, hyperTGL, insulin resistance, asthma, ADD, anxiety, constipation  S/p C-sxn x2, R ankle repair, lap irina, robotic DEVAN, UHR, UHR with mesh for recurrence, T&A    I have explained our Enhanced Recovery After Bariatric Surgery (ERABS) protocol and benefits including preoperative, intraoperative and postoperative elements  As a part of her pre op evaluation, she will be referred to a cardiologist for risk stratification    She needs an EGD to evaluate the anatomy of her GI tract prior to the operation  I have spent over 45 minutes with her face to face in the office today discussing her options and details of the surgery  We have seen an animation of the surgery on the computer that illustrates how the operation is done and how the anatomy will be altered with the procedure  Over 50% of this was coordinating care  She was given the opportunity to ask questions and I have answered all of them    I have discussed and educated the patient with regards to the components of our multidisciplinary program and the importance of compliance and follow up in the post operative period  The patient was also instructed with regards to the importance of behavior modification, nutritional counseling, support meeting attendance and lifestyle changes that are important to ensure success  Although there is a great statistical chance of improvement or even resolution of most of her associated comorbidities, the results vary from patient to patient and they largely depend on her commitment and compliance  She needs to be at   196 8 lbs prior to the operation      ALESSANDRO Estrada   9/27/2019  11:43 AM

## 2019-09-27 NOTE — LETTER
September 27, 2019     Rd Patino DO  [address]    Patient: Basia Gonzalez   YOB: 1980   Date of Visit: 9/27/2019       Dear Dr Carlene Bowling: Thank you for referring [facility] to me for evaluation for bariatric surgery  Below are my notes for this consultation  If you have questions, please do not hesitate to call me  I look forward to following your patient along with you  Sincerely,      ALESSANDRO Romano   9/27/2019  12:05 PM          CC: No Recipients  Adilia Bean MD  9/27/2019 12:04 PM  Sign at close encounter      BARIATRIC INITIAL CONSULT - [id number] [facility] 35 y o  female MRN: [id number]  Unit/Bed#:  Encounter: [id number]      HPI:  Basia Gonzalez is a 35 y o  female who presents with a longstanding history of morbid obesity and inability to sustain a meaningful weight loss  Here today to discuss bariatric options  She is a  for [employer] mass index is 38 89 kg/m²  ++Suffers from pseudotumor cerebri, fibromyalgia, IBS, hypothyroidism, HLD, hyperTGL, insulin resistance, asthma, ADD, anxiety, constipation  S/p C-sxn x2, R ankle repair, lap irina, robotic DEVAN, UHR, UHR with mesh for recurrence, T&A    PONV with fentanyl (?)    Visit type: initial visit    Symptoms: excess weight, weight increase, inability to loss weight and fatigue    Associated Symptoms: anxiety    Associated Conditions: glucose intolerance, elevated triglycerides and abdominal obesity  Disease Complications: none  Weight Loss Interest: high  Previous Diet Trials: low carb    Exercise Frequency:infrequency  Types of Exercise: walking      Review of Systems   Constitutional: Negative  Respiratory: Negative  Cardiovascular: Negative  Gastrointestinal: Negative  Musculoskeletal: Negative  Neurological: Negative  All other systems reviewed and are negative        Historical Information   Past Medical History: Diagnosis Date    Acid reflux     Anxiety     Depression     Fibromyalgia     Headache     Hypothyroidism     IBS (irritable bowel syndrome)     Infertility, female     Insulin resistance     Obesity (BMI 30-39  9)     Occipital neuralgia     Papilledema     PCOS (polycystic ovarian syndrome)     Pseudotumor cerebri     Tremor      Past Surgical History:   Procedure Laterality Date    ANKLE FRACTURE SURGERY       SECTION      CHOLECYSTECTOMY      CHOLESTEATOMA EXCISION      DILATION AND CURETTAGE OF UTERUS      ENDOMETRIAL BIOPSY      HYSTERECTOMY      TONSILLECTOMY AND ADENOIDECTOMY      UMBILICAL HERNIA REPAIR       Social History   Social History     Substance and Sexual Activity   Alcohol Use Yes    Comment: social     Social History     Substance and Sexual Activity   Drug Use No     Social History     Tobacco Use   Smoking Status Never Smoker   Smokeless Tobacco Never Used     Family History:   Family History   Problem Relation Age of Onset    Hypertension Mother     Hypothyroidism Mother     Hypertension Father     Hypothyroidism Father     Insulin resistance Father     Hypothyroidism Sister        Meds/Allergies   all medications and allergies reviewed  Allergies   Allergen Reactions    Amoxicillin-Pot Clavulanate GI Intolerance    Cefdinir GI Intolerance    Cephalosporins GI Intolerance    Erythromycin GI Intolerance    Fentanyl Other (See Comments)     PT STATES DOES NOT WANT FENTANYL  Other reaction(s): Nausea and/or vomiting  PT STATES DOES NOT WANT FENTANYL      Metformin Diarrhea    Morphine GI Intolerance    Pollen Extract     Sulfa Antibiotics Other (See Comments)     Unknown, pt was told to stay away from sulfa since child    Sulfasalazine      has ibs and vomits  has ibs and vomits      Sulfate Other (See Comments)       Objective       Current Vitals:   /80 (BP Location: Left arm, Patient Position: Sitting, Cuff Size: Large)   Pulse 89 Temp 99 1 °F (37 3 °C) (Tympanic)   Ht 5' 1" (1 549 m)   Wt 93 4 kg (205 lb 12 8 oz)   BMI 38 89 kg/m²        Physical Exam   Constitutional: She is oriented to person, place, and time  She appears well-developed  HENT:   Head: Normocephalic  Eyes: EOM are normal    Neck: Normal range of motion  Cardiovascular: Normal rate  Pulmonary/Chest: Effort normal    Abdominal: She exhibits no distension  Musculoskeletal: Normal range of motion  Neurological: She is alert and oriented to person, place, and time  Skin: Skin is warm and dry  Psychiatric: She has a normal mood and affect  Her behavior is normal  Judgment and thought content normal        Lab Results: I have personally reviewed pertinent lab results  Imaging: I have personally reviewed pertinent reports  EKG, Pathology, and Other Studies: I have personally reviewed pertinent reports  Assessment/PLAN:    35 y o  yo female with a long standing h/o of obesity and inability to sustain any meaningful weight loss on her own despite several attempts  She is interested in the Laparoscopic Sleeve gastrectomy  I have discussed with the patient that 20-30% of patient's may experience worsened GERD and 18% risk of Mon's esophagitis requiring surveillance EGDs after a sleeve gastrectomy  The patient understands this fully and accepts the risks to undergo a sleeve gastrectomy  ++Suffers from pseudotumor cerebri, fibromyalgia, IBS, hypothyroidism, HLD, hyperTGL, insulin resistance, asthma, ADD, anxiety, constipation  S/p C-sxn x2, R ankle repair, lap irina, robotic DEVAN, UHR, UHR with mesh for recurrence, T&A    I have explained our Enhanced Recovery After Bariatric Surgery (ERABS) protocol and benefits including preoperative, intraoperative and postoperative elements       As a part of her pre op evaluation, she will be referred to a cardiologist for risk stratification    She needs an EGD to evaluate the anatomy of her GI tract prior to the operation  I have spent over 45 minutes with her face to face in the office today discussing her options and details of the surgery  We have seen an animation of the surgery on the computer that illustrates how the operation is done and how the anatomy will be altered with the procedure  Over 50% of this was coordinating care  She was given the opportunity to ask questions and I have answered all of them  I have discussed and educated the patient with regards to the components of our multidisciplinary program and the importance of compliance and follow up in the post operative period  The patient was also instructed with regards to the importance of behavior modification, nutritional counseling, support meeting attendance and lifestyle changes that are important to ensure success  Although there is a great statistical chance of improvement or even resolution of most of her associated comorbidities, the results vary from patient to patient and they largely depend on her commitment and compliance  She needs to be at   196 8 lbs prior to the operation      ALESSANDRO Price   9/27/2019  11:43 AM

## 2019-10-10 ENCOUNTER — ANESTHESIA EVENT (OUTPATIENT)
Dept: GASTROENTEROLOGY | Facility: HOSPITAL | Age: 39
End: 2019-10-10

## 2019-10-11 ENCOUNTER — ANESTHESIA (OUTPATIENT)
Dept: GASTROENTEROLOGY | Facility: HOSPITAL | Age: 39
End: 2019-10-11

## 2019-10-11 ENCOUNTER — HOSPITAL ENCOUNTER (OUTPATIENT)
Dept: GASTROENTEROLOGY | Facility: HOSPITAL | Age: 39
Setting detail: OUTPATIENT SURGERY
Discharge: HOME | End: 2019-10-11
Attending: SURGERY | Admitting: SURGERY
Payer: COMMERCIAL

## 2019-10-11 VITALS
SYSTOLIC BLOOD PRESSURE: 98 MMHG | TEMPERATURE: 97.6 F | DIASTOLIC BLOOD PRESSURE: 60 MMHG | OXYGEN SATURATION: 98 % | RESPIRATION RATE: 21 BRPM | BODY MASS INDEX: 38.54 KG/M2 | HEIGHT: 61 IN | HEART RATE: 80 BPM | WEIGHT: 204.15 LBS

## 2019-10-11 DIAGNOSIS — E66.01 MORBID OBESITY (HCC): ICD-10-CM

## 2019-10-11 LAB — GLUCOSE SERPL-MCNC: 104 MG/DL (ref 65–140)

## 2019-10-11 PROCEDURE — 88305 TISSUE EXAM BY PATHOLOGIST: CPT | Performed by: PATHOLOGY

## 2019-10-11 PROCEDURE — 88342 IMHCHEM/IMCYTCHM 1ST ANTB: CPT | Performed by: PATHOLOGY

## 2019-10-11 PROCEDURE — 82948 REAGENT STRIP/BLOOD GLUCOSE: CPT

## 2019-10-11 PROCEDURE — 43239 EGD BIOPSY SINGLE/MULTIPLE: CPT | Performed by: SURGERY

## 2019-10-11 RX ORDER — SODIUM CHLORIDE, SODIUM LACTATE, POTASSIUM CHLORIDE, CALCIUM CHLORIDE 600; 310; 30; 20 MG/100ML; MG/100ML; MG/100ML; MG/100ML
INJECTION, SOLUTION INTRAVENOUS CONTINUOUS PRN
Status: DISCONTINUED | OUTPATIENT
Start: 2019-10-11 | End: 2019-10-11 | Stop reason: SURG

## 2019-10-11 RX ORDER — SODIUM CHLORIDE, SODIUM LACTATE, POTASSIUM CHLORIDE, CALCIUM CHLORIDE 600; 310; 30; 20 MG/100ML; MG/100ML; MG/100ML; MG/100ML
100 INJECTION, SOLUTION INTRAVENOUS CONTINUOUS
Status: DISCONTINUED | OUTPATIENT
Start: 2019-10-11 | End: 2019-10-15 | Stop reason: HOSPADM

## 2019-10-11 RX ORDER — LIDOCAINE HYDROCHLORIDE 10 MG/ML
INJECTION, SOLUTION INFILTRATION; PERINEURAL AS NEEDED
Status: DISCONTINUED | OUTPATIENT
Start: 2019-10-11 | End: 2019-10-11 | Stop reason: SURG

## 2019-10-11 RX ORDER — PROPOFOL 10 MG/ML
INJECTION, EMULSION INTRAVENOUS AS NEEDED
Status: DISCONTINUED | OUTPATIENT
Start: 2019-10-11 | End: 2019-10-11 | Stop reason: SURG

## 2019-10-11 RX ADMIN — PROPOFOL 150 MG: 10 INJECTION, EMULSION INTRAVENOUS at 08:16

## 2019-10-11 RX ADMIN — PROPOFOL 20 MG: 10 INJECTION, EMULSION INTRAVENOUS at 08:19

## 2019-10-11 RX ADMIN — SODIUM CHLORIDE, SODIUM LACTATE, POTASSIUM CHLORIDE, AND CALCIUM CHLORIDE 100 ML/HR: .6; .31; .03; .02 INJECTION, SOLUTION INTRAVENOUS at 08:11

## 2019-10-11 RX ADMIN — LIDOCAINE HYDROCHLORIDE 50 MG: 10 INJECTION, SOLUTION INFILTRATION; PERINEURAL at 08:16

## 2019-10-11 RX ADMIN — SODIUM CHLORIDE, SODIUM LACTATE, POTASSIUM CHLORIDE, AND CALCIUM CHLORIDE: .6; .31; .03; .02 INJECTION, SOLUTION INTRAVENOUS at 08:02

## 2019-10-11 RX ADMIN — PROPOFOL 50 MG: 10 INJECTION, EMULSION INTRAVENOUS at 08:18

## 2019-10-11 NOTE — H&P
This is a 35 y o  female with a history of morbid obesity and Body mass index is 38 57 kg/m²  Here for an EGD to evaluate the anatomy of the GI tract and to rule out the presence of H  pylori  Physical Exam    /67   Pulse 77   Temp 97 9 °F (36 6 °C) (Temporal)   Resp 16   Ht 5' 1" (1 549 m)   Wt 92 6 kg (204 lb 2 3 oz)   SpO2 98%   Breastfeeding? No   BMI 38 57 kg/m²    AAOx3  RRR  CTA B  Abdomen obese  Benign  A/P:    This is a 35 y o  female with a history of morbid obesity and Body mass index is 38 57 kg/m²       Will proceed with the EGD and biopsies        Elizabeth Helm MD  10/11/2019  8:08 AM

## 2019-10-11 NOTE — ANESTHESIA PREPROCEDURE EVALUATION
Review of Systems/Medical History  Patient summary reviewed    No history of anesthetic complications     Cardiovascular  Exercise tolerance (METS): >4,  Hyperlipidemia,    Pulmonary  Negative pulmonary ROS        GI/Hepatic    GERD ,             Endo/Other  Diabetes Oral agent, History of thyroid disease , hypothyroidism,   Obesity    GYN       Hematology  Negative hematology ROS      Musculoskeletal  Negative musculoskeletal ROS        Neurology    Headaches,    Psychology   Anxiety, Depression ,              Physical Exam    Airway    Mallampati score: III  TM Distance: >3 FB  Neck ROM: full     Dental   No notable dental hx     Cardiovascular  Rhythm: regular, Rate: normal,     Pulmonary  Breath sounds clear to auscultation,     Other Findings        Anesthesia Plan  ASA Score- 3     Anesthesia Type- IV sedation with anesthesia with ASA Monitors  Additional Monitors:   Airway Plan:         Plan Factors-  Patient did not smoke on day of surgery  Induction- intravenous  Postoperative Plan-     Informed Consent- Anesthetic plan and risks discussed with patient  I personally reviewed this patient with the CRNA  Discussed and agreed on the Anesthesia Plan with the CRNA  Rupesh Roper

## 2019-10-11 NOTE — DISCHARGE INSTRUCTIONS
Upper Endoscopy   WHAT YOU NEED TO KNOW:   An upper endoscopy is also called an upper gastrointestinal (GI) endoscopy, or an esophagogastroduodenoscopy (EGD)  You may feel bloated, gassy, or have some abdominal discomfort after your procedure  Your throat may be sore for 24 to 36 hours  You may burp or pass gas from air that is still inside your body  DISCHARGE INSTRUCTIONS:   Call 911 if:   · You have sudden chest pain or trouble breathing  Seek care immediately if:   · You feel dizzy or faint  · You have trouble swallowing  · You have severe throat pain  · Your bowel movements are very dark or black  · Your abdomen is hard and firm and you have severe pain  · You vomit blood  Contact your healthcare provider if:   · You feel full or bloated and cannot burp or pass gas  · You have not had a bowel movement for 3 days after your procedure  · You have neck pain  · You have a fever or chills  · You have nausea or are vomiting  · You have a rash or hives  · You have questions or concerns about your endoscopy  Relieve a sore throat:  Suck on throat lozenges or crushed ice  Gargle with a small amount of warm salt water  Mix 1 teaspoon of salt and 1 cup of warm water to make salt water  Relieve gas and discomfort from bloating:  Lie on your right side with a heating pad on your abdomen  Take short walks to help pass gas  Eat small meals until bloating is relieved  Rest after your procedure:  Do not drive or make important decisions until the day after your procedure  Return to your normal activity as directed  You can usually return to work the day after your procedure  Follow up with your healthcare provider as directed:  Write down your questions so you remember to ask them during your visits  © 2017 [employer]  Information is for End User's use only and may not be sold, redistributed or otherwise used for commercial purposes   All illustrations and images included in [employer] are the copyrighted property of [employer] , [employer]  or Josh Singh  The above information is an  only  It is not intended as medical advice for individual conditions or treatments  Talk to your doctor, nurse or pharmacist before following any medical regimen to see if it is safe and effective for you

## 2019-10-11 NOTE — ANESTHESIA POSTPROCEDURE EVALUATION
Post-Op Assessment Note    CV Status:  Stable    Pain management: adequate     Mental Status:  Sleepy   Hydration Status:  Euvolemic   PONV Controlled:  Controlled   Airway Patency:  Patent   Post Op Vitals Reviewed: Yes      Staff: CRNA           Temp      Pulse     Resp      SpO2

## 2019-10-28 ENCOUNTER — TELEPHONE (OUTPATIENT)
Dept: BARIATRICS | Facility: CLINIC | Age: 39
End: 2019-10-28

## 2019-10-28 NOTE — TELEPHONE ENCOUNTER
Clinical Coordinator Estee Fraga from [facility] ([phone]) called regarding this patient  Patient had an appointment today with Neurology but showed up late so they were unable to see her  However, patient did leave post-it with question that both Dr Hazeline Kehr and our RD wanted answered  Estee Fraga is calling today to answer the question since it will not be showing up in an office note because patient was not seen  I agreed I would take the message and pass it on but that the information might still be required to be documented in a letter or office note  Specifically, Estee Fraga says that there is no fluid restriction necessary for this patient  The only thing they would recommend is that patient see an ophthalmologist for examination six weeks after bariatric surgery to monitor her Papilledema, but that they would tell patient this directly

## 2019-10-30 ENCOUNTER — OFFICE VISIT (OUTPATIENT)
Dept: BARIATRICS | Facility: CLINIC | Age: 39
End: 2019-10-30

## 2019-10-30 VITALS — WEIGHT: 207.2 LBS | BODY MASS INDEX: 39.12 KG/M2 | HEIGHT: 61 IN

## 2019-10-30 DIAGNOSIS — E66.01 MORBID (SEVERE) OBESITY DUE TO EXCESS CALORIES (HCC): Primary | ICD-10-CM

## 2019-10-30 PROCEDURE — RECHECK

## 2019-10-30 NOTE — PROGRESS NOTES
Bariatric Follow Up Nutrition Note    Pre-op  3 Month Pre-op Program    Weight check: 2/3    Type of surgery  Surgery Date: TBD  Surgeon: Dr Kaminski January  35 y o   female    There were no vitals filed for this visit  Weight (last 2 days)     None        There is no height or weight on file to calculate BMI  Height: 61 inches    4/ weight check will be with RAMONA REVELES in 1 month    Reviewed pt's pre-operative workflow in [employer] this visit:  Support Group: pt states she will attend the December support group at the [facility]  PCP letter: received on 19  Blood work: WNL for surgery at this time - discussed results with pt at this appointment today  Cardiology: scheduled for 2019  Sleep medicine: n/a  Weight checks: 3 required; 2/3 weight check is today's appointment  EGD: completed on 10/11/2019  Weight loss: 5% required; not achieved at this time    Pre-op Weight History:  Weight on Day of Initial Dietary Evaluation: 207 2 lbs  (BMI 39 2) 2019  Current Weight: 207 2 lbs  Pt has lost 0 lbs  since initial dietary evaluation  Pre-op goal weight: 196 8 lbs  (5% loss from initial evaluation weight)   Weight to be scheduled for surgery after completing clearances: 202 0 lbs  (1/2 lost of required pre-op goal weight)    Weight in to have BMI = 25: 132 4 lbs  Review of History and Medications   Past Medical History:   Diagnosis Date    Acid reflux     Anxiety     Asthma     Depression     Fibromyalgia     Fibromyalgia     Headache     Hyperlipidemia     Hypothyroidism     IBS (irritable bowel syndrome)     Infertility, female     Insulin resistance     Obesity (BMI 30-39  9)     Occipital neuralgia     Papilledema     PCOS (polycystic ovarian syndrome)     Pseudotumor cerebri     Tremor      Past Surgical History:   Procedure Laterality Date    ANKLE FRACTURE SURGERY Right     hardware     SECTION      CHOLECYSTECTOMY      DILATION AND CURETTAGE OF UTERUS      ENDOMETRIAL BIOPSY      HYSTERECTOMY      TONSILLECTOMY AND ADENOIDECTOMY      UMBILICAL HERNIA REPAIR       Social History     Socioeconomic History    Marital status: /Civil Union     Spouse name: Not on file    Number of children: Not on file    Years of education: Not on file    Highest education level: Not on file   Occupational History    Not on file   Social Needs    Financial resource strain: Not on file    Food insecurity:     Worry: Not on file     Inability: Not on file    Transportation needs:     Medical: Not on file     Non-medical: Not on file   Tobacco Use    Smoking status: Never Smoker    Smokeless tobacco: Never Used   Substance and Sexual Activity    Alcohol use:  Yes     Alcohol/week: 1 0 standard drinks     Types: 1 Standard drinks or equivalent per week     Comment: social    Drug use: No    Sexual activity: Not on file   Lifestyle    Physical activity:     Days per week: Not on file     Minutes per session: Not on file    Stress: Not on file   Relationships    Social connections:     Talks on phone: Not on file     Gets together: Not on file     Attends [employer] service: Not on file     Active member of club or organization: Not on file     Attends meetings of clubs or organizations: Not on file     Relationship status: Not on file    Intimate partner violence:     Fear of current or ex partner: Not on file     Emotionally abused: Not on file     Physically abused: Not on file     Forced sexual activity: Not on file   Other Topics Concern    Not on file   Social History Narrative    Not on file       Current Outpatient Medications:     acetaZOLAMIDE (DIAMOX) 250 mg tablet, TAKE 2 TABLET TWICE DAILY, Disp: 120 tablet, Rfl: 6    Albuterol Sulfate (VENTOLIN HFA IN), Ventolin HFA 90 mcg/actuation aerosol inhaler, Disp: , Rfl:     amphetamine-dextroamphetamine (ADDERALL) 10 mg tablet, Take 10 mg by mouth 2 (two) times a day, Disp: , Rfl:     atorvastatin (LIPITOR) 20 mg tablet, atorvastatin 20 mg tablet, Disp: , Rfl:     Azelastine-Fluticasone 137-50 MCG/ACT SUSP, 2 spray(s) BID prn, Disp: , Rfl:     BD PEN NEEDLE ANDREA U/F 32G X 4 MM MISC, ONCE DAILY, Disp: , Rfl: 4    dicyclomine (BENTYL) 20 mg tablet, Take 1 tablet (20 mg total) by mouth every 6 (six) hours (Patient not taking: Reported on 9/17/2019), Disp: 90 tablet, Rfl: 2    gabapentin (NEURONTIN) 300 mg capsule, gabapentin 300 mg capsule, Disp: , Rfl:     ibuprofen (MOTRIN) 800 mg tablet, ibuprofen 800 mg tablet, Disp: , Rfl:     Insulin Pen Needle 32G X 6 MM MISC, BD Ultra-Fine Andrea Pen Needle 32 gauge x 5/32", Disp: , Rfl:     levothyroxine 88 mcg tablet, Take 1 tablet by mouth daily, Disp: , Rfl:     meclizine (ANTIVERT) 25 mg tablet, TAKE 1 TABLET BY MOUTH THREE TIMES A DAY AS NEEDED FOR DIZZINESS, Disp: , Rfl: 1    metFORMIN (GLUCOPHAGE-XR) 500 mg 24 hr tablet, TAKE 1 TABLET BY MOUTH TWICE A DAY WITH MEALS, Disp: , Rfl:     mirtazapine (REMERON) 7 5 MG tablet, Take 1 tablet (7 5 mg total) by mouth daily at bedtime for 30 days, Disp: 30 tablet, Rfl: 0    montelukast (SINGULAIR) 10 mg tablet, Take 10 mg by mouth daily, Disp: , Rfl: 1    Multiple Vitamin (MULTI-VITAMIN DAILY) TABS, Take 1 tablet by mouth daily, Disp: , Rfl:     NAPROXEN 375 MG TBEC, Take 375 mg by mouth 2 (two) times a day, Disp: , Rfl: 2    ondansetron (ZOFRAN-ODT) 4 mg disintegrating tablet, Take 1 tablet (4 mg total) by mouth every 8 (eight) hours as needed for nausea for up to 3 days, Disp: 12 tablet, Rfl: 0    Probiotic Product (PROBIOTIC-10 PO), Take by mouth, Disp: , Rfl:     venlafaxine (EFFEXOR) 75 mg tablet, venlafaxine 75 mg tablet, Disp: , Rfl:     venlafaxine (EFFEXOR-XR) 150 mg 24 hr capsule, Take 150 mg by mouth daily, Disp: , Rfl:     Food Intake and Lifestyle Assessment:   Pt brought manual to this visit - yes  Pt is completing pre-operative lesson plans - yes  Pt is practicing 30/60-minute rule - yes - pt states this is difficult for her because she has issues with dry mouth from certain medications  Pt is food journaling/logging - no  Pt is measuring foods using measuring cups and spoons - yes  Pt is getting at least 64 oz of sugar-free, caffeine-free, noncarbonated fluids daily - yes   Types of fluids include: water   Pt is exercising - no    Food Intake Assessment completed via usual diet recall:  Breakfast: Premier protein shake  Snack: none   Lunch: large chocolate chip muffin  Snack: fruit with peanut butter; Veggie Straws; rice cakes  Dinner: macaroni with red sauce and meatballs  Snack: ice cream sandwich; donuts    Nutrition Diagnosis  Diagnosis: Overweight / Obesity (NC-3 3)  Related to: Physical inactivity and Excessive energy intake  As Evidenced by: BMI >25      Interventions and Teaching   Patient educated on post-op nutrition guidelines  Patient educated and handouts provided    Diet progression  Adequate hydration  Exercise  Suggestions for pre-op diet  Nutrition considerations after surgery  Protein supplements  Meal planning and preparation  Appropriate carbohydrate, protein, and fat intake, and food/fluid choices to maximize safe weight loss, nutrient intake, and tolerance   Dietary and lifestyle changes  Possible problems with poor eating habits  Intuitive eating  Techniques for self monitoring and keeping daily food journal  Potential for food intolerance after surgery, and ways to deal with them including: lactose intolerance, nausea, reflux, vomiting, diarrhea, food intolerance, appetite changes, gas  Vitamin / Mineral supplementation of Multivitamin with minerals and Vitamin D     Education provided to: patient  Barriers to learning: No barriers identified  Readiness to change: Preparation  Comprehension: demonstrated understanding   Expected Compliance: good     Evaluation / Monitoring:  Eating pattern as discussed, Tolerance of nutrition prescription, Body weight, Lab values, Physical activity     Goals:  - Pt is to work on discussing her dietary needs and restrictions with her family (they bring over junk foods and leftovers that pt states she knows she should not be eating)  - Pt is to work on making sure the foods that she has at home are not flavors that she is tempted by - i e  make the donuts that her family brings covered in salt to not tempt her when she is wanting to indulge  - Pt is to log her meals and snacks carefully to understand some of her GI side effects that she states she sometimes experiences (gas issues and some pain)  - Pt is to follow-up regarding the need to drink while she eats (against the 30/60-minute rule) because she is on medications that make her mouth dry    Time Spent:   30 Minutes

## 2019-11-22 PROBLEM — M79.7 FIBROMYALGIA: Status: ACTIVE | Noted: 2018-05-23

## 2019-11-22 PROBLEM — I10 BENIGN HYPERTENSION: Status: ACTIVE | Noted: 2018-05-23

## 2019-11-22 PROBLEM — F41.8 DEPRESSION WITH ANXIETY: Status: ACTIVE | Noted: 2018-05-23

## 2019-11-22 PROBLEM — E11.9 TYPE 2 DIABETES MELLITUS WITHOUT COMPLICATION (HCC): Status: RESOLVED | Noted: 2018-05-23 | Resolved: 2019-11-22

## 2019-11-22 PROBLEM — E11.9 TYPE 2 DIABETES MELLITUS WITHOUT COMPLICATION (HCC): Status: ACTIVE | Noted: 2018-05-23

## 2019-11-25 ENCOUNTER — OFFICE VISIT (OUTPATIENT)
Dept: GASTROENTEROLOGY | Facility: CLINIC | Age: 39
End: 2019-11-25
Payer: COMMERCIAL

## 2019-11-25 VITALS
WEIGHT: 207.2 LBS | DIASTOLIC BLOOD PRESSURE: 80 MMHG | BODY MASS INDEX: 38.13 KG/M2 | SYSTOLIC BLOOD PRESSURE: 118 MMHG | HEIGHT: 62 IN | HEART RATE: 86 BPM

## 2019-11-25 DIAGNOSIS — K59.04 CHRONIC IDIOPATHIC CONSTIPATION: ICD-10-CM

## 2019-11-25 DIAGNOSIS — R10.13 EPIGASTRIC PAIN: Primary | ICD-10-CM

## 2019-11-25 PROCEDURE — 99213 OFFICE O/P EST LOW 20 MIN: CPT | Performed by: PHYSICIAN ASSISTANT

## 2019-11-25 RX ORDER — ESOMEPRAZOLE MAGNESIUM 40 MG/1
40 CAPSULE, DELAYED RELEASE ORAL DAILY
Qty: 30 CAPSULE | Refills: 3 | Status: SHIPPED | OUTPATIENT
Start: 2019-11-25 | End: 2020-01-21

## 2019-11-25 NOTE — PROGRESS NOTES
[facility] - Outpatient Follow-up Note  Cassy Sake 35 y o  female MRN: [id number]  Encounter: [id number]          ASSESSMENT AND PLAN:      1  Epigastric pain  EGD showed hiatal hernia and mild gastritis  She does not feel Pantoprazole was helping  Will try Nexium    2  Chronic idiopathic constipation  Failed Linzess and Trulance as they both caused diarrhea  Amitiza helped for a short time before symptoms returned  At present she is having a BM about every 2-3 days without straining  Will not add any additional medications at this time    She is being evaluated for a gastric sleeve  We discussed how this could potentially worsen her reflux and constipation    ______________________________________________________________________    SUBJECTIVE:  48-year-old female with acid reflux and chronic constipation presents for follow-up  She states that she is having bowel movements every 2-3 days without straining  She previously tried and failed Linzess and truly insisted both cause diarrhea  Amitiza help for short time before her symptoms return  She does not tolerate MiraLax due to taste  She is trying to eat a high-fiber diet  She had an EGD recently in preparation for gastric sleeve surgery  This documented a hiatal hernia and mild gastritis  She was put on pantoprazole but still had symptoms of chest burning  She recently took one of her 's Nexiums with good relief  REVIEW OF SYSTEMS IS OTHERWISE NEGATIVE  Historical Information   Past Medical History:   Diagnosis Date    Acid reflux     Anxiety     Asthma     Depression     Fibromyalgia     Fibromyalgia     Headache     Hyperlipidemia     Hypothyroidism     IBS (irritable bowel syndrome)     Infertility, female     Insulin resistance     Obesity (BMI 30-39  9)     Occipital neuralgia     Papilledema     PCOS (polycystic ovarian syndrome)     Pseudotumor cerebri     Tremor      Past Surgical History:   Procedure Laterality Date    ANKLE FRACTURE SURGERY Right     hardware     SECTION      CHOLECYSTECTOMY      DILATION AND CURETTAGE OF UTERUS      ENDOMETRIAL BIOPSY      HYSTERECTOMY      TONSILLECTOMY AND ADENOIDECTOMY      UMBILICAL HERNIA REPAIR       Social History   Social History     Substance and Sexual Activity   Alcohol Use Yes    Alcohol/week: 1 0 standard drinks    Types: 1 Standard drinks or equivalent per week    Comment: social     Social History     Substance and Sexual Activity   Drug Use No     Social History     Tobacco Use   Smoking Status Never Smoker   Smokeless Tobacco Never Used     Family History   Problem Relation Age of Onset    Hypertension Mother     Hypothyroidism Mother     Hypertension Father     Hypothyroidism Father     Insulin resistance Father     Hypothyroidism Sister        Meds/Allergies       Current Outpatient Medications:     acetaZOLAMIDE (DIAMOX) 250 mg tablet    Albuterol Sulfate (VENTOLIN HFA IN)    amphetamine-dextroamphetamine (ADDERALL) 10 mg tablet    atorvastatin (LIPITOR) 20 mg tablet    Azelastine-Fluticasone 137-50 MCG/ACT SUSP    gabapentin (NEURONTIN) 300 mg capsule    ibuprofen (MOTRIN) 800 mg tablet    levothyroxine 88 mcg tablet    meclizine (ANTIVERT) 25 mg tablet    metFORMIN (GLUCOPHAGE-XR) 500 mg 24 hr tablet    montelukast (SINGULAIR) 10 mg tablet    Multiple Vitamin (MULTI-VITAMIN DAILY) TABS    NAPROXEN 375 MG TBEC    Probiotic Product (PROBIOTIC-10 PO)    venlafaxine (EFFEXOR) 75 mg tablet    venlafaxine (EFFEXOR-XR) 150 mg 24 hr capsule    BD PEN NEEDLE ANDREA U/F 32G X 4 MM MISC    dicyclomine (BENTYL) 20 mg tablet    DULoxetine (CYMBALTA) 30 mg delayed release capsule    ergocalciferol (VITAMIN D2) 50,000 units    esomeprazole (NexIUM) 40 MG capsule    Insulin Pen Needle 32G X 6 MM MISC    mirtazapine (REMERON) 7 5 MG tablet    ondansetron (ZOFRAN-ODT) 4 mg disintegrating tablet   oxyCODONE-acetaminophen (PERCOCET) 5-325 mg per tablet    topiramate (TOPAMAX) 50 MG tablet    Allergies   Allergen Reactions    Amoxicillin-Pot Clavulanate GI Intolerance    Cefdinir GI Intolerance    Cephalosporins GI Intolerance    Erythromycin GI Intolerance    Fentanyl Other (See Comments) and GI Intolerance     PT STATES DOES NOT WANT FENTANYL  Other reaction(s): Nausea and/or vomiting  PT STATES DOES NOT WANT FENTANYL      Pollen Extract     Sulfa Antibiotics Other (See Comments)     Unknown, pt was told to stay away from sulfa since child    Sulfasalazine      has ibs and vomits  has ibs and vomits             Objective     Blood pressure 118/80, pulse 86, height 5' 2" (1 575 m), weight 94 kg (207 lb 3 2 oz), not currently breastfeeding  Body mass index is 37 9 kg/m²  PHYSICAL EXAM:      General Appearance:   Alert, cooperative, no distress   HEENT:   Normocephalic, atraumatic, anicteric      Neck:  Supple, symmetrical, trachea midline   Lungs:   Clear to auscultation bilaterally; no rales, rhonchi or wheezing; respirations unlabored    Heart[de-identified]   Regular rate and rhythm; no murmur, rub, or gallop  Abdomen:   Soft, non-tender, non-distended; normal bowel sounds; no masses, no organomegaly    Genitalia:   Deferred    Rectal:   Deferred    Extremities:  No cyanosis, clubbing or edema    Pulses:  2+ and symmetric    Skin:  No jaundice, rashes, or lesions    Lymph nodes:  No palpable cervical lymphadenopathy        Lab Results:   No visits with results within 1 Day(s) from this visit     Latest known visit with results is:   Hospital Outpatient Visit on 10/11/2019   Component Date Value    POC Glucose 10/11/2019 104     Case Report 10/11/2019                      Value:Surgical Pathology Report                         Case: [id number]                                   Authorizing Provider:  Jovita Quevedo MD      Collected:           10/11/2019 0817              Ordering Location:        [place]       Received:            10/11/2019 [address] [facility]                                                             Pathologist:           Osito Jimenez DO                                                      Specimen:    Stomach, antrum                                                                            Addendum 10/11/2019                      Value: This result contains rich text formatting which cannot be displayed here   Final Diagnosis 10/11/2019                      Value: This result contains rich text formatting which cannot be displayed here   Note 10/11/2019                      Value: This result contains rich text formatting which cannot be displayed here   Additional Information 10/11/2019                      Value: This result contains rich text formatting which cannot be displayed here  [place] [facility] Description 10/11/2019                      Value: This result contains rich text formatting which cannot be displayed here   Clinical Information 10/11/2019                      Value:cold bx eval h pylori         Radiology Results:   No results found

## 2019-12-12 ENCOUNTER — TELEPHONE (OUTPATIENT)
Dept: BARIATRICS | Facility: CLINIC | Age: 39
End: 2019-12-12

## 2019-12-12 NOTE — TELEPHONE ENCOUNTER
LM for patient per below email from MR  I explained the halting process and what that entails but encouraged patient to take care of more important aspects of her life right now and she can always call us in the future  From: Paul Mattson   Sent: Monday, December 09, 2019 10:57 AM  To: Jcarlos Mcqueen  Subject: RE: Maricel Vincent    I called the patient, but it appears she is going to have to go on hold for now  Her mom isnt doing well and may not make it, and she said, right now theres just so much going on with that, shes not sure if shes coming or going  she understands that if she stops now, she may have to start over and is fine with it

## 2020-01-21 DIAGNOSIS — R10.13 EPIGASTRIC PAIN: ICD-10-CM

## 2020-01-21 RX ORDER — ESOMEPRAZOLE MAGNESIUM 40 MG/1
CAPSULE, DELAYED RELEASE ORAL
Qty: 30 CAPSULE | Refills: 0 | Status: SHIPPED | OUTPATIENT
Start: 2020-01-21 | End: 2020-02-20

## 2020-02-20 DIAGNOSIS — R10.13 EPIGASTRIC PAIN: ICD-10-CM

## 2020-02-20 RX ORDER — ESOMEPRAZOLE MAGNESIUM 40 MG/1
CAPSULE, DELAYED RELEASE ORAL
Qty: 30 CAPSULE | Refills: 11 | Status: SHIPPED | OUTPATIENT
Start: 2020-02-20 | End: 2021-04-26

## 2020-04-23 ENCOUNTER — TELEMEDICINE (OUTPATIENT)
Dept: GASTROENTEROLOGY | Facility: CLINIC | Age: 40
End: 2020-04-23
Payer: COMMERCIAL

## 2020-04-23 DIAGNOSIS — E66.01 MORBID OBESITY DUE TO EXCESS CALORIES (HCC): ICD-10-CM

## 2020-04-23 DIAGNOSIS — K21.9 GASTROESOPHAGEAL REFLUX DISEASE WITHOUT ESOPHAGITIS: ICD-10-CM

## 2020-04-23 DIAGNOSIS — K59.04 CHRONIC IDIOPATHIC CONSTIPATION: ICD-10-CM

## 2020-04-23 DIAGNOSIS — E66.01 MORBID OBESITY DUE TO EXCESS CALORIES (HCC): Primary | ICD-10-CM

## 2020-04-23 PROCEDURE — 99213 OFFICE O/P EST LOW 20 MIN: CPT | Performed by: PHYSICIAN ASSISTANT

## 2020-04-24 RX ORDER — DIETHYLPROPION HYDROCHLORIDE 25 MG/1
TABLET ORAL
Qty: 90 TABLET | Refills: 1 | Status: SHIPPED | OUTPATIENT
Start: 2020-04-24 | End: 2020-05-26 | Stop reason: SDUPTHER

## 2020-05-01 ENCOUNTER — TELEPHONE (OUTPATIENT)
Dept: GASTROENTEROLOGY | Facility: CLINIC | Age: 40
End: 2020-05-01

## 2020-05-26 ENCOUNTER — TELEPHONE (OUTPATIENT)
Dept: GASTROENTEROLOGY | Facility: CLINIC | Age: 40
End: 2020-05-26

## 2020-05-26 DIAGNOSIS — E66.01 MORBID OBESITY DUE TO EXCESS CALORIES (HCC): ICD-10-CM

## 2020-05-26 RX ORDER — DIETHYLPROPION HYDROCHLORIDE 25 MG/1
TABLET ORAL
Qty: 90 TABLET | Refills: 1 | Status: SHIPPED | OUTPATIENT
Start: 2020-05-26 | End: 2020-09-10 | Stop reason: SDUPTHER

## 2020-08-28 ENCOUNTER — TELEPHONE (OUTPATIENT)
Dept: GASTROENTEROLOGY | Facility: CLINIC | Age: 40
End: 2020-08-28

## 2020-08-28 DIAGNOSIS — E66.01 MORBID OBESITY DUE TO EXCESS CALORIES (HCC): ICD-10-CM

## 2020-09-10 RX ORDER — DIETHYLPROPION HYDROCHLORIDE 25 MG/1
TABLET ORAL
Qty: 90 TABLET | Refills: 1 | Status: SHIPPED | OUTPATIENT
Start: 2020-09-10

## 2020-09-10 NOTE — TELEPHONE ENCOUNTER
denited  Resubmitted prior auth to covermymeds with corrected information  Key [id number]     APPROVED

## 2021-04-26 DIAGNOSIS — R10.13 EPIGASTRIC PAIN: ICD-10-CM

## 2021-04-26 RX ORDER — ESOMEPRAZOLE MAGNESIUM 40 MG/1
CAPSULE, DELAYED RELEASE ORAL
Qty: 90 CAPSULE | Refills: 3 | Status: SHIPPED | OUTPATIENT
Start: 2021-04-26

## 2021-09-28 PROBLEM — R49.0 MUSCLE TENSION DYSPHONIA: Status: ACTIVE | Noted: 2021-09-28

## 2021-10-05 ENCOUNTER — HOSPITAL ENCOUNTER (OUTPATIENT)
Dept: CT IMAGING | Facility: CLINIC | Age: 41
Discharge: HOME | End: 2021-10-05
Payer: COMMERCIAL

## 2021-10-05 DIAGNOSIS — G93.2 PSEUDOTUMOR CEREBRI: ICD-10-CM

## 2021-10-05 DIAGNOSIS — H90.0 CONDUCTIVE HEARING LOSS OF BOTH EARS: ICD-10-CM

## 2021-10-05 PROCEDURE — G1004 CDSM NDSC: HCPCS

## 2021-10-05 PROCEDURE — 70480 CT ORBIT/EAR/FOSSA W/O DYE: CPT

## 2022-03-25 ENCOUNTER — TELEPHONE (OUTPATIENT)
Dept: UROLOGY | Facility: AMBULATORY SURGERY CENTER | Age: 42
End: 2022-03-25

## 2022-03-25 NOTE — TELEPHONE ENCOUNTER
Please Triage  New Patient - True Doll    What is the reason for the patients appointment? 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 [employer]  Provider & Plan: same as on file   Member ID#: Has the patient had any previous Urologist(s)? 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 NO    Has the patient had any outside testing done? NO    Does the patient have a personal history of cancer?  NO    Patient can be reached at : [phone]

## 2022-03-25 NOTE — TELEPHONE ENCOUNTER
New patient contacted at [phone]  A new patient appointment has been scheduled for patient 4/1/22  Pt will be contacting her PCP nursing triage staff and request  urine testing

## 2022-04-06 ENCOUNTER — TELEPHONE (OUTPATIENT)
Dept: UROLOGY | Facility: CLINIC | Age: 42
End: 2022-04-06

## 2022-04-06 NOTE — TELEPHONE ENCOUNTER
Called and LM for patient that her appointment for today has been cancelled due to provider being out  Office number provided for patient to call back and reschedule  When patient calls back please assist in scheduling patient with an AP       Thank you

## 2022-04-14 ENCOUNTER — TELEPHONE (OUTPATIENT)
Dept: UROLOGY | Facility: CLINIC | Age: 42
End: 2022-04-14

## 2022-04-14 NOTE — TELEPHONE ENCOUNTER
THE [facility] letting patient know if she wanted to come in earlier for appointment she could, office number provided

## 2023-08-13 ENCOUNTER — HOSPITAL ENCOUNTER (EMERGENCY)
Facility: HOSPITAL | Age: 43
Discharge: HOME | End: 2023-08-13
Attending: EMERGENCY MEDICINE
Payer: COMMERCIAL

## 2023-08-13 ENCOUNTER — APPOINTMENT (EMERGENCY)
Dept: RADIOLOGY | Facility: HOSPITAL | Age: 43
End: 2023-08-13
Payer: COMMERCIAL

## 2023-08-13 VITALS
BODY MASS INDEX: 39.46 KG/M2 | HEART RATE: 86 BPM | RESPIRATION RATE: 18 BRPM | TEMPERATURE: 98 F | SYSTOLIC BLOOD PRESSURE: 119 MMHG | HEIGHT: 61 IN | OXYGEN SATURATION: 97 % | DIASTOLIC BLOOD PRESSURE: 73 MMHG | WEIGHT: 209 LBS

## 2023-08-13 DIAGNOSIS — R51.9 ACUTE NONINTRACTABLE HEADACHE: Primary | ICD-10-CM

## 2023-08-13 DIAGNOSIS — R07.9 CHEST PAIN: ICD-10-CM

## 2023-08-13 DIAGNOSIS — I10 BENIGN HYPERTENSION: ICD-10-CM

## 2023-08-13 LAB
ALBUMIN SERPL BCP-MCNC: 4.1 G/DL (ref 3.5–5)
ALP SERPL-CCNC: 97 U/L (ref 34–104)
ALT SERPL W P-5'-P-CCNC: 17 U/L (ref 7–52)
ANION GAP SERPL CALCULATED.3IONS-SCNC: 9 MMOL/L
AST SERPL W P-5'-P-CCNC: 16 U/L (ref 13–39)
ATRIAL RATE: 94 BPM
BASOPHILS # BLD AUTO: 0.06 THOUSANDS/ÂΜL (ref 0–0.1)
BASOPHILS NFR BLD AUTO: 1 % (ref 0–1)
BILIRUB SERPL-MCNC: 0.25 MG/DL (ref 0.2–1)
BUN SERPL-MCNC: 14 MG/DL (ref 5–25)
CALCIUM SERPL-MCNC: 8.8 MG/DL (ref 8.4–10.2)
CARDIAC TROPONIN I PNL SERPL HS: <2 NG/L
CHLORIDE SERPL-SCNC: 108 MMOL/L (ref 96–108)
CO2 SERPL-SCNC: 21 MMOL/L (ref 21–32)
CREAT SERPL-MCNC: 0.78 MG/DL (ref 0.6–1.3)
D DIMER PPP FEU-MCNC: 0.28 UG/ML FEU
EOSINOPHIL # BLD AUTO: 0.21 THOUSAND/ÂΜL (ref 0–0.61)
EOSINOPHIL NFR BLD AUTO: 2 % (ref 0–6)
ERYTHROCYTE [DISTWIDTH] IN BLOOD BY AUTOMATED COUNT: 14.6 % (ref 11.6–15.1)
GFR SERPL CREATININE-BSD FRML MDRD: 93 ML/MIN/1.73SQ M
GLUCOSE SERPL-MCNC: 97 MG/DL (ref 65–140)
HCT VFR BLD AUTO: 41.2 % (ref 34.8–46.1)
HGB BLD-MCNC: 12.8 G/DL (ref 11.5–15.4)
IMM GRANULOCYTES # BLD AUTO: 0.05 THOUSAND/UL (ref 0–0.2)
IMM GRANULOCYTES NFR BLD AUTO: 0 % (ref 0–2)
LYMPHOCYTES # BLD AUTO: 2.87 THOUSANDS/ÂΜL (ref 0.6–4.47)
LYMPHOCYTES NFR BLD AUTO: 24 % (ref 14–44)
MCH RBC QN AUTO: 27.8 PG (ref 26.8–34.3)
MCHC RBC AUTO-ENTMCNC: 31.1 G/DL (ref 31.4–37.4)
MCV RBC AUTO: 90 FL (ref 82–98)
MONOCYTES # BLD AUTO: 0.59 THOUSAND/ÂΜL (ref 0.17–1.22)
MONOCYTES NFR BLD AUTO: 5 % (ref 4–12)
NEUTROPHILS # BLD AUTO: 8.19 THOUSANDS/ÂΜL (ref 1.85–7.62)
NEUTS SEG NFR BLD AUTO: 68 % (ref 43–75)
NRBC BLD AUTO-RTO: 0 /100 WBCS
P AXIS: 65 DEGREES
PLATELET # BLD AUTO: 324 THOUSANDS/UL (ref 149–390)
PMV BLD AUTO: 9.3 FL (ref 8.9–12.7)
POTASSIUM SERPL-SCNC: 3.8 MMOL/L (ref 3.5–5.3)
PR INTERVAL: 132 MS
PROT SERPL-MCNC: 6.9 G/DL (ref 6.4–8.4)
QRS AXIS: 24 DEGREES
QRSD INTERVAL: 76 MS
QT INTERVAL: 362 MS
QTC INTERVAL: 452 MS
RBC # BLD AUTO: 4.6 MILLION/UL (ref 3.81–5.12)
SODIUM SERPL-SCNC: 138 MMOL/L (ref 135–147)
T WAVE AXIS: 61 DEGREES
VENTRICULAR RATE: 94 BPM
WBC # BLD AUTO: 11.97 THOUSAND/UL (ref 4.31–10.16)

## 2023-08-13 PROCEDURE — 84484 ASSAY OF TROPONIN QUANT: CPT | Performed by: PHYSICIAN ASSISTANT

## 2023-08-13 PROCEDURE — 96375 TX/PRO/DX INJ NEW DRUG ADDON: CPT

## 2023-08-13 PROCEDURE — 93005 ELECTROCARDIOGRAM TRACING: CPT

## 2023-08-13 PROCEDURE — 99285 EMERGENCY DEPT VISIT HI MDM: CPT | Performed by: PHYSICIAN ASSISTANT

## 2023-08-13 PROCEDURE — 93010 ELECTROCARDIOGRAM REPORT: CPT | Performed by: INTERNAL MEDICINE

## 2023-08-13 PROCEDURE — 99285 EMERGENCY DEPT VISIT HI MDM: CPT

## 2023-08-13 PROCEDURE — 36415 COLL VENOUS BLD VENIPUNCTURE: CPT | Performed by: PHYSICIAN ASSISTANT

## 2023-08-13 PROCEDURE — 80053 COMPREHEN METABOLIC PANEL: CPT | Performed by: PHYSICIAN ASSISTANT

## 2023-08-13 PROCEDURE — 96374 THER/PROPH/DIAG INJ IV PUSH: CPT

## 2023-08-13 PROCEDURE — 85025 COMPLETE CBC W/AUTO DIFF WBC: CPT | Performed by: PHYSICIAN ASSISTANT

## 2023-08-13 PROCEDURE — 85379 FIBRIN DEGRADATION QUANT: CPT | Performed by: PHYSICIAN ASSISTANT

## 2023-08-13 PROCEDURE — 71046 X-RAY EXAM CHEST 2 VIEWS: CPT

## 2023-08-13 RX ORDER — AMLODIPINE BESYLATE 10 MG/1
10 TABLET ORAL DAILY
Qty: 21 TABLET | Refills: 0 | Status: SHIPPED | OUTPATIENT
Start: 2023-08-13

## 2023-08-13 RX ORDER — DIPHENHYDRAMINE HYDROCHLORIDE 50 MG/ML
25 INJECTION INTRAMUSCULAR; INTRAVENOUS ONCE
Status: COMPLETED | OUTPATIENT
Start: 2023-08-13 | End: 2023-08-13

## 2023-08-13 RX ORDER — METOCLOPRAMIDE HYDROCHLORIDE 5 MG/ML
10 INJECTION INTRAMUSCULAR; INTRAVENOUS ONCE
Status: COMPLETED | OUTPATIENT
Start: 2023-08-13 | End: 2023-08-13

## 2023-08-13 RX ORDER — ONDANSETRON 2 MG/ML
4 INJECTION INTRAMUSCULAR; INTRAVENOUS ONCE
Status: COMPLETED | OUTPATIENT
Start: 2023-08-13 | End: 2023-08-13

## 2023-08-13 RX ORDER — KETOROLAC TROMETHAMINE 30 MG/ML
15 INJECTION, SOLUTION INTRAMUSCULAR; INTRAVENOUS ONCE
Status: COMPLETED | OUTPATIENT
Start: 2023-08-13 | End: 2023-08-13

## 2023-08-13 RX ORDER — PANTOPRAZOLE SODIUM 40 MG/1
40 TABLET, DELAYED RELEASE ORAL DAILY
Qty: 20 TABLET | Refills: 0 | Status: SHIPPED | OUTPATIENT
Start: 2023-08-13

## 2023-08-13 RX ORDER — MAGNESIUM HYDROXIDE/ALUMINUM HYDROXICE/SIMETHICONE 120; 1200; 1200 MG/30ML; MG/30ML; MG/30ML
30 SUSPENSION ORAL ONCE
Status: COMPLETED | OUTPATIENT
Start: 2023-08-13 | End: 2023-08-13

## 2023-08-13 RX ORDER — SUCRALFATE 1 G/1
1 TABLET ORAL 4 TIMES DAILY
Qty: 20 TABLET | Refills: 0 | Status: SHIPPED | OUTPATIENT
Start: 2023-08-13

## 2023-08-13 RX ORDER — METOCLOPRAMIDE 10 MG/1
10 TABLET ORAL 3 TIMES DAILY PRN
Qty: 15 TABLET | Refills: 0 | Status: SHIPPED | OUTPATIENT
Start: 2023-08-13

## 2023-08-13 RX ORDER — LIDOCAINE HYDROCHLORIDE 20 MG/ML
15 SOLUTION OROPHARYNGEAL ONCE
Status: COMPLETED | OUTPATIENT
Start: 2023-08-13 | End: 2023-08-13

## 2023-08-13 RX ADMIN — DIPHENHYDRAMINE HYDROCHLORIDE 25 MG: 50 INJECTION, SOLUTION INTRAMUSCULAR; INTRAVENOUS at 19:46

## 2023-08-13 RX ADMIN — KETOROLAC TROMETHAMINE 15 MG: 30 INJECTION, SOLUTION INTRAMUSCULAR; INTRAVENOUS at 18:29

## 2023-08-13 RX ADMIN — LIDOCAINE HYDROCHLORIDE 15 ML: 20 SOLUTION ORAL at 19:46

## 2023-08-13 RX ADMIN — ALUMINUM HYDROXIDE, MAGNESIUM HYDROXIDE, AND DIMETHICONE 30 ML: 200; 20; 200 SUSPENSION ORAL at 19:46

## 2023-08-13 RX ADMIN — METOCLOPRAMIDE 10 MG: 5 INJECTION, SOLUTION INTRAMUSCULAR; INTRAVENOUS at 19:46

## 2023-08-13 RX ADMIN — ONDANSETRON 4 MG: 2 INJECTION INTRAMUSCULAR; INTRAVENOUS at 18:30

## 2023-08-13 NOTE — ED NOTES
Patient transported to [facility], [address]  08/13/23 8160

## 2023-08-13 NOTE — ED PROVIDER NOTES
History  Chief Complaint   Patient presents with   • Chest Pain     Patient reports intermittent chest burning, nausea, SOB, HTN x 1 week. Patient tearful in triage. 23-year-old female patient here for chest pain. Describes a burning sensation. Epigastric and substernal region ongoing for the past week. Progressively worsening. Associate with shortness of breath. She is also had issues with elevated blood pressure recently and she is awaiting surgery for her left ankle but states no one will do surgery until her blood pressure is under control. She was just started on losartan this past Friday and has taken 3 total doses without any improvement in her pressure. She is also complaining of headache. She was previously on Diamox for pseudotumor cerebri but is no longer taking this. Denies any fevers or chills. No recent travels traumas or surgeries. No leg pain or swelling. History provided by:  Patient   used: No        Prior to Admission Medications   Prescriptions Last Dose Informant Patient Reported? Taking?    Albuterol Sulfate (VENTOLIN HFA IN)  Self Yes No   Sig: Ventolin HFA 90 mcg/actuation aerosol inhaler   Azelastine-Fluticasone 137-50 MCG/ACT SUSP  Self Yes No   Si spray(s) BID prn   BD PEN NEEDLE ANDREA U/F 32G X 4 MM MISC  Self Yes No   Sig: ONCE DAILY   DULoxetine (CYMBALTA) 30 mg delayed release capsule  Self Yes No   Sig: duloxetine 30 mg capsule,delayed release   Diethylpropion HCl 25 MG TABS   No No   Si PO TID 1 hour AC   Insulin Pen Needle 32G X 6 MM MISC  Self Yes No   Sig: BD Ultra-Fine Andrea Pen Needle 32 gauge x 5/32"   Multiple Vitamin (MULTI-VITAMIN DAILY) TABS  Self Yes No   Sig: Take 1 tablet by mouth daily   NAPROXEN 375 MG TBEC  Self Yes No   Sig: Take 375 mg by mouth 2 (two) times a day   Probiotic Product (PROBIOTIC-10 PO)  Self Yes No   Sig: Take by mouth   acetaZOLAMIDE (DIAMOX) 250 mg tablet  Self No No   Sig: TAKE 2 TABLET TWICE DAILY amphetamine-dextroamphetamine (ADDERALL) 10 mg tablet  Self Yes No   Sig: Take 10 mg by mouth 2 (two) times a day   atorvastatin (LIPITOR) 20 mg tablet  Self Yes No   Sig: atorvastatin 20 mg tablet   cyclobenzaprine (FLEXERIL) 10 mg tablet   Yes No   Sig: As needed   ergocalciferol (VITAMIN D2) 50,000 units  Self Yes No   Sig: ergocalciferol (vitamin D2) 1,250 mcg (50,000 unit) capsule   esomeprazole (NexIUM) 40 MG capsule   No No   Sig: TAKE 1 CAPSULE BY MOUTH EVERY DAY   gabapentin (NEURONTIN) 300 mg capsule  Self Yes No   Sig: gabapentin 300 mg capsule   ibuprofen (MOTRIN) 800 mg tablet  Self Yes No   Sig: ibuprofen 800 mg tablet   levothyroxine 88 mcg tablet  Self Yes No   Sig: Take 1 tablet by mouth daily   meclizine (ANTIVERT) 25 mg tablet  Self Yes No   Sig: TAKE 1 TABLET BY MOUTH THREE TIMES A DAY AS NEEDED FOR DIZZINESS   metFORMIN (GLUCOPHAGE-XR) 500 mg 24 hr tablet  Self Yes No   Sig: TAKE 1 TABLET BY MOUTH TWICE A DAY WITH MEALS   mirtazapine (REMERON) 7.5 MG tablet   No No   Sig: Take 1 tablet (7.5 mg total) by mouth daily at bedtime for 30 days   montelukast (SINGULAIR) 10 mg tablet  Self Yes No   Sig: Take 10 mg by mouth daily   ondansetron (ZOFRAN-ODT) 4 mg disintegrating tablet   No No   Sig: Take 1 tablet (4 mg total) by mouth every 8 (eight) hours as needed for nausea for up to 3 days   oxyCODONE-acetaminophen (PERCOCET) 5-325 mg per tablet  Self Yes No   Sig: Take 1 tablet by mouth every 4-6 hours as needed   prednisoLONE acetate (PRED FORTE) 1 % ophthalmic suspension   No No   Sig: Administer 4 drops to both eyes 2 (two) times a day   venlafaxine (EFFEXOR) 75 mg tablet  Self Yes No   Sig: venlafaxine 75 mg tablet   venlafaxine (EFFEXOR-XR) 150 mg 24 hr capsule  Self Yes No   Sig: Take 150 mg by mouth daily      Facility-Administered Medications: None       Past Medical History:   Diagnosis Date   • Acid reflux    • Anxiety    • Asthma    • Depression    • Fibromyalgia    • Fibromyalgia    • Headache    • Hyperlipidemia    • Hypothyroidism    • IBS (irritable bowel syndrome)    • Infertility, female    • Insulin resistance    • Obesity (BMI 30-39. 9)    • Occipital neuralgia    • Papilledema    • PCOS (polycystic ovarian syndrome)    • Pseudotumor cerebri    • Tinnitus    • Tremor        Past Surgical History:   Procedure Laterality Date   • ANKLE FRACTURE SURGERY Right     hardware   •  SECTION     • CHOLECYSTECTOMY     • DILATION AND CURETTAGE OF UTERUS     • ENDOMETRIAL BIOPSY     • HYSTERECTOMY     • TONSILLECTOMY AND ADENOIDECTOMY     • UMBILICAL HERNIA REPAIR         Family History   Problem Relation Age of Onset   • Hypertension Mother    • Hypothyroidism Mother    • Hypertension Father    • Hypothyroidism Father    • Insulin resistance Father    • Hypothyroidism Sister      I have reviewed and agree with the history as documented. E-Cigarette/Vaping     E-Cigarette/Vaping Substances     Social History     Tobacco Use   • Smoking status: Never   • Smokeless tobacco: Never   Substance Use Topics   • Alcohol use: Yes     Alcohol/week: 1.0 standard drink of alcohol     Types: 1 Standard drinks or equivalent per week     Comment: social   • Drug use: No       Review of Systems   Constitutional: Negative for chills and fever. HENT: Negative for ear pain and sore throat. Eyes: Negative for pain and visual disturbance. Respiratory: Positive for shortness of breath. Negative for cough. Cardiovascular: Positive for chest pain. Negative for palpitations. Gastrointestinal: Negative for abdominal pain and vomiting. Genitourinary: Negative for dysuria and hematuria. Musculoskeletal: Negative for arthralgias and back pain. Skin: Negative for color change and rash. Neurological: Negative for seizures and syncope. All other systems reviewed and are negative. Physical Exam  Physical Exam  Vitals and nursing note reviewed.    Constitutional:       General: She is not in acute distress. Appearance: She is well-developed. HENT:      Head: Normocephalic and atraumatic. Eyes:      Conjunctiva/sclera: Conjunctivae normal.   Cardiovascular:      Rate and Rhythm: Normal rate and regular rhythm. Heart sounds: No murmur heard. Pulmonary:      Effort: Pulmonary effort is normal. No respiratory distress. Breath sounds: Normal breath sounds. Abdominal:      Palpations: Abdomen is soft. Tenderness: There is no abdominal tenderness. Musculoskeletal:         General: No swelling. Cervical back: Neck supple. Skin:     General: Skin is warm and dry. Capillary Refill: Capillary refill takes less than 2 seconds. Neurological:      Mental Status: She is alert. GCS: GCS eye subscore is 4. GCS verbal subscore is 5. GCS motor subscore is 6.    Psychiatric:         Mood and Affect: Mood normal.      Comments: Anxious and tearful on examination         Vital Signs  ED Triage Vitals   Temperature Pulse Respirations Blood Pressure SpO2   08/13/23 1751 08/13/23 1751 08/13/23 1751 08/13/23 1751 08/13/23 1751   98 °F (36.7 °C) 104 18 (!) 185/109 100 %      Temp Source Heart Rate Source Patient Position - Orthostatic VS BP Location FiO2 (%)   08/13/23 1751 08/13/23 1751 08/13/23 1751 08/13/23 1751 --   Tympanic Monitor Sitting Left arm       Pain Score       08/13/23 1829       6           Vitals:    08/13/23 1935 08/13/23 2000 08/13/23 2030 08/13/23 2100   BP: 145/88 134/82 127/78 119/73   Pulse:  84 83 86   Patient Position - Orthostatic VS:             Visual Acuity  Visual Acuity    Flowsheet Row Most Recent Value   L Pupil Size (mm) 3   R Pupil Size (mm) 3          ED Medications  Medications   ketorolac (TORADOL) injection 15 mg (15 mg Intravenous Given 8/13/23 1829)   ondansetron (ZOFRAN) injection 4 mg (4 mg Intravenous Given 8/13/23 1830)   aluminum-magnesium hydroxide-simethicone (MAALOX) oral suspension 30 mL (30 mL Oral Given 8/13/23 1946)   Lidocaine Viscous HCl (XYLOCAINE) 2 % mucosal solution 15 mL (15 mL Swish & Spit Given 8/13/23 1946)   metoclopramide (REGLAN) injection 10 mg (10 mg Intravenous Given 8/13/23 1946)   diphenhydrAMINE (BENADRYL) injection 25 mg (25 mg Intravenous Given 8/13/23 1946)       Diagnostic Studies  Results Reviewed     Procedure Component Value Units Date/Time    HS Troponin 0hr (reflex protocol) [[id number]]  (Normal) Collected: 08/13/23 1829    Lab Status: Final result Specimen: Blood from Arm, Left Updated: 08/13/23 1906     hs TnI 0hr <2 ng/L     Comprehensive metabolic panel [[id number]] Collected: 08/13/23 1829    Lab Status: Final result Specimen: Blood from Arm, Left Updated: 08/13/23 1857     Sodium 138 mmol/L      Potassium 3.8 mmol/L      Chloride 108 mmol/L      CO2 21 mmol/L      ANION GAP 9 mmol/L      BUN 14 mg/dL      Creatinine 0.78 mg/dL      Glucose 97 mg/dL      Calcium 8.8 mg/dL      AST 16 U/L      ALT 17 U/L      Alkaline Phosphatase 97 U/L      Total Protein 6.9 g/dL      Albumin 4.1 g/dL      Total Bilirubin 0.25 mg/dL      eGFR 93 ml/min/1.73sq m     Narrative:      [place] guidelines for Chronic Kidney Disease (CKD):   •  Stage 1 with normal or high GFR (GFR > 90 mL/min/1.73 square meters)  •  Stage 2 Mild CKD (GFR = 60-89 mL/min/1.73 square meters)  •  Stage 3A Moderate CKD (GFR = 45-59 mL/min/1.73 square meters)  •  Stage 3B Moderate CKD (GFR = 30-44 mL/min/1.73 square meters)  •  Stage 4 Severe CKD (GFR = 15-29 mL/min/1.73 square meters)  •  Stage 5 End Stage CKD (GFR <15 mL/min/1.73 square meters)  Note: GFR calculation is accurate only with a steady state creatinine    D-Dimer [[id number]]  (Normal) Collected: 08/13/23 1829    Lab Status: Final result Specimen: Blood from Arm, Left Updated: 08/13/23 1852     D-Dimer, Quant 0.28 ug/ml FEU     CBC and differential [[id number]]  (Abnormal) Collected: 08/13/23 1829    Lab Status: Final result Specimen: Blood from Arm, Left Updated: 08/13/23 1840     WBC 11.97 Thousand/uL      RBC 4.60 Million/uL      Hemoglobin 12.8 g/dL      Hematocrit 41.2 %      MCV 90 fL      MCH 27.8 pg      MCHC 31.1 g/dL      RDW 14.6 %      MPV 9.3 fL      Platelets 227 Thousands/uL      nRBC 0 /100 WBCs      Neutrophils Relative 68 %      Immat GRANS % 0 %      Lymphocytes Relative 24 %      Monocytes Relative 5 %      Eosinophils Relative 2 %      Basophils Relative 1 %      Neutrophils Absolute 8.19 Thousands/µL      Immature Grans Absolute 0.05 Thousand/uL      Lymphocytes Absolute 2.87 Thousands/µL      Monocytes Absolute 0.59 Thousand/µL      Eosinophils Absolute 0.21 Thousand/µL      Basophils Absolute 0.06 Thousands/µL                  XR chest 2 views    (Results Pending)              Procedures  ECG 12 Lead Documentation Only    Date/Time: 8/13/2023 6:27 PM    Performed by: Santos Schaefer PA-C  Authorized by: Santos Schaefer PA-C    ECG reviewed by me, the ED Provider: yes    Patient location:  ED  Interpretation:     Interpretation: normal    Quality:     Tracing quality:  Limited by artifact  Rate:     ECG rate:  94    ECG rate assessment: normal    Rhythm:     Rhythm: sinus rhythm    Ectopy:     Ectopy: none    QRS:     QRS axis:  Normal    QRS intervals:  Normal  Conduction:     Conduction: normal    ST segments:     ST segments:  Normal  T waves:     T waves: normal               ED Course  ED Course as of 08/13/23 2148   Sun Aug 13, 2023   2107 Feels better, BP improve. Normal neuro exam.             HEART Risk Score    Flowsheet Row Most Recent Value   Heart Score Risk Calculator    History 0 Filed at: 08/13/2023 2148   ECG 0 Filed at: 08/13/2023 2148   Age 0 Filed at: 08/13/2023 2148   Risk Factors 1 Filed at: 08/13/2023 2148   Troponin 0 Filed at: 08/13/2023 2148   HEART Score 1 Filed at: 08/13/2023 2148                        SBIRT 22yo+    Flowsheet Row Most Recent Value   Initial Alcohol Screen: US AUDIT-C     1.  How often do you have a drink containing alcohol? 0 Filed at: 08/13/2023 1755   2. How many drinks containing alcohol do you have on a typical day you are drinking? 0 Filed at: 08/13/2023 1755   3a. Male UNDER 65: How often do you have five or more drinks on one occasion? 0 Filed at: 08/13/2023 1755   3b. FEMALE Any Age, or MALE 65+: How often do you have 4 or more drinks on one occassion? 0 Filed at: 08/13/2023 1755   Audit-C Score 0 Filed at: 08/13/2023 1755   RYAN: How many times in the past year have you. .. Used an illegal drug or used a prescription medication for non-medical reasons? Never Filed at: 08/13/2023 1755                    Medical Decision Making  DDX including but not limited to: ACS, MI, PE, PTX, pneumonia, dissection, pleurisy, pericarditis, myocarditis, rhabdomyolysis, GI etiology. Plan: Cardiac workup. Labs. dispo pending. MDM: 36year-old with chest pain and headache. Headache typical of prior migraines per patient. After migraine medications her headache has improved greatly. Chest pain work-up is unremarkable. Negative troponins. Negative EKG. Could be GI etiology possibly worsened by stress and anxiety. We discussed close outpatient follow-up with PCP. She is adamant she would like to switch her antihypertensive with the goal being to get surgery on her ankle this coming Friday. She states they will not do surgery if she is hypertensive. Switch to norvasc. Return parameters provided. Pt understands and agrees with plan. Amount and/or Complexity of Data Reviewed  Labs: ordered. Radiology: ordered. Risk  OTC drugs. Prescription drug management.           Disposition  Final diagnoses:   Chest pain   Acute nonintractable headache   Benign hypertension     Time reflects when diagnosis was documented in both MDM as applicable and the Disposition within this note     Time User Action Codes Description Comment    8/13/2023  9:05 PM Jose Roberto Hoang Add [R07.9] Chest pain     8/13/2023  9:05 PM Comfort Calzada Add [R51.9] Acute nonintractable headache     8/13/2023  9:05 PM Comfortjennifer Calzada Modify [R07.9] Chest pain     8/13/2023  9:05 PM Comfortjennifer Calzada Modify [R51.9] Acute nonintractable headache     8/13/2023  9:06 PM Comfort Calzada Add [I10] Benign hypertension       ED Disposition     ED Disposition   Discharge    Condition   Stable    Date/Time   Sun Aug 13, 2023  9:05 PM    Comment   Seema Brock discharge to home/self care.                Follow-up Information     Follow up With Specialties Details Why Contact Info Additional [facility] Emergency Medicine Go to  If symptoms worsen [address] [facility], [place], [place], [address]          Discharge Medication List as of 8/13/2023  9:06 PM      START taking these medications    Details   amLODIPine (NORVASC) 10 mg tablet Take 1 tablet (10 mg total) by mouth daily, Starting Sun 8/13/2023, Print      metoclopramide (Reglan) 10 mg tablet Take 1 tablet (10 mg total) by mouth 3 (three) times a day as needed (nausea, headache), Starting Sun 8/13/2023, Print         CONTINUE these medications which have NOT CHANGED    Details   acetaZOLAMIDE (DIAMOX) 250 mg tablet TAKE 2 TABLET TWICE DAILY, Normal      Albuterol Sulfate (VENTOLIN HFA IN) Ventolin HFA 90 mcg/actuation aerosol inhaler, Historical Med      amphetamine-dextroamphetamine (ADDERALL) 10 mg tablet Take 10 mg by mouth 2 (two) times a day, Historical Med      atorvastatin (LIPITOR) 20 mg tablet atorvastatin 20 mg tablet, Historical Med      Azelastine-Fluticasone 137-50 MCG/ACT SUSP 2 spray(s) BID prn, Historical Med      !! BD PEN NEEDLE ANDREA U/F 32G X 4 MM MISC ONCE DAILY, Historical Med      cyclobenzaprine (FLEXERIL) 10 mg tablet As needed, Starting Mon 9/20/2021, Historical Med      Diethylpropion HCl 25 MG TABS 1 PO TID 1 hour AC, Normal      DULoxetine (CYMBALTA) 30 mg delayed release capsule duloxetine 30 mg capsule,delayed release, Historical Med      ergocalciferol (VITAMIN D2) 50,000 units ergocalciferol (vitamin D2) 1,250 mcg (50,000 unit) capsule, Historical Med      esomeprazole (NexIUM) 40 MG capsule TAKE 1 CAPSULE BY MOUTH EVERY DAY, Normal      gabapentin (NEURONTIN) 300 mg capsule gabapentin 300 mg capsule, Historical Med      ibuprofen (MOTRIN) 800 mg tablet ibuprofen 800 mg tablet, Historical Med      !!  Insulin Pen Needle 32G X 6 MM MISC BD Ultra-Fine Sherrill Pen Needle 32 gauge x 5/32", Historical Med      levothyroxine 88 mcg tablet Take 1 tablet by mouth daily, Historical Med      meclizine (ANTIVERT) 25 mg tablet TAKE 1 TABLET BY MOUTH THREE TIMES A DAY AS NEEDED FOR DIZZINESS, Historical Med      metFORMIN (GLUCOPHAGE-XR) 500 mg 24 hr tablet TAKE 1 TABLET BY MOUTH TWICE A DAY WITH MEALS, Historical Med      mirtazapine (REMERON) 7.5 MG tablet Take 1 tablet (7.5 mg total) by mouth daily at bedtime for 30 days, Starting Mon 3/26/2018, Until Wed 4/25/2018, Normal      montelukast (SINGULAIR) 10 mg tablet Take 10 mg by mouth daily, Starting Mon 1/8/2018, Historical Med      Multiple Vitamin (MULTI-VITAMIN DAILY) TABS Take 1 tablet by mouth daily, Historical Med      NAPROXEN 375 MG TBEC Take 375 mg by mouth 2 (two) times a day, Starting Wed 4/17/2019, Historical Med      ondansetron (ZOFRAN-ODT) 4 mg disintegrating tablet Take 1 tablet (4 mg total) by mouth every 8 (eight) hours as needed for nausea for up to 3 days, Starting Sun 9/23/2018, Until Wed 9/26/2018, Print      oxyCODONE-acetaminophen (PERCOCET) 5-325 mg per tablet Take 1 tablet by mouth every 4-6 hours as needed, Starting Mon 10/21/2019, Historical Med      prednisoLONE acetate (PRED FORTE) 1 % ophthalmic suspension Administer 4 drops to both eyes 2 (two) times a day, Starting Tue 10/12/2021, Normal      Probiotic Product (PROBIOTIC-10 PO) Take by mouth, Historical Med      venlafaxine (EFFEXOR) 75 mg tablet venlafaxine 75 mg tablet, Historical Med      venlafaxine (EFFEXOR-XR) 150 mg 24 hr capsule Take 150 mg by mouth daily, Historical Med       !! - Potential duplicate medications found. Please discuss with provider. No discharge procedures on file.     PDMP Review     None          ED Provider  Electronically Signed by           Viky Hernandez PA-C  08/13/23 [id number]

## 2023-12-04 RX ORDER — LORAZEPAM 0.5 MG/1
1 TABLET ORAL 2 TIMES DAILY PRN
COMMUNITY

## 2023-12-04 RX ORDER — PREDNISONE 20 MG/1
TABLET ORAL
COMMUNITY

## 2023-12-04 RX ORDER — FLUTICASONE PROPIONATE 110 UG/1
2 AEROSOL, METERED RESPIRATORY (INHALATION) 2 TIMES DAILY
COMMUNITY

## 2023-12-04 RX ORDER — DIAZEPAM 5 MG/1
TABLET ORAL
COMMUNITY

## 2023-12-04 RX ORDER — DULAGLUTIDE 0.75 MG/.5ML
INJECTION, SOLUTION SUBCUTANEOUS
COMMUNITY

## 2023-12-04 RX ORDER — AMOXICILLIN 500 MG/1
CAPSULE ORAL
COMMUNITY

## 2023-12-04 RX ORDER — TOPIRAMATE 50 MG/1
1 TABLET, FILM COATED ORAL 2 TIMES DAILY
COMMUNITY

## 2023-12-04 RX ORDER — BUSPIRONE HYDROCHLORIDE 5 MG/1
1 TABLET ORAL 2 TIMES DAILY
COMMUNITY
Start: 2023-07-31

## 2023-12-04 RX ORDER — HYDROXYZINE 50 MG/1
TABLET, FILM COATED ORAL
COMMUNITY
Start: 2023-08-03

## 2023-12-04 RX ORDER — AZELASTINE 1 MG/ML
SPRAY, METERED NASAL
COMMUNITY

## 2023-12-04 RX ORDER — DOXYCYCLINE 100 MG/1
1 TABLET ORAL 2 TIMES DAILY
COMMUNITY

## 2023-12-04 RX ORDER — ACETAMINOPHEN AND CODEINE PHOSPHATE 300; 30 MG/1; MG/1
1 TABLET ORAL
COMMUNITY

## 2023-12-04 RX ORDER — RIMEGEPANT SULFATE 75 MG/75MG
TABLET, ORALLY DISINTEGRATING ORAL
COMMUNITY
Start: 2023-08-30

## 2023-12-04 RX ORDER — CYCLOSPORINE 0.5 MG/ML
EMULSION OPHTHALMIC
COMMUNITY

## 2023-12-04 RX ORDER — METHYLPHENIDATE HYDROCHLORIDE 20 MG/1
TABLET ORAL
COMMUNITY

## 2023-12-05 ENCOUNTER — OFFICE VISIT (OUTPATIENT)
Dept: GASTROENTEROLOGY | Facility: CLINIC | Age: 43
End: 2023-12-05
Payer: COMMERCIAL

## 2023-12-05 VITALS
HEART RATE: 82 BPM | DIASTOLIC BLOOD PRESSURE: 84 MMHG | SYSTOLIC BLOOD PRESSURE: 124 MMHG | BODY MASS INDEX: 40.59 KG/M2 | HEIGHT: 61 IN | WEIGHT: 215 LBS

## 2023-12-05 DIAGNOSIS — R07.9 CHEST PAIN: ICD-10-CM

## 2023-12-05 DIAGNOSIS — R07.9 CHEST PAIN, UNSPECIFIED TYPE: ICD-10-CM

## 2023-12-05 DIAGNOSIS — K21.9 GASTROESOPHAGEAL REFLUX DISEASE, UNSPECIFIED WHETHER ESOPHAGITIS PRESENT: ICD-10-CM

## 2023-12-05 DIAGNOSIS — R13.19 ESOPHAGEAL DYSPHAGIA: Primary | ICD-10-CM

## 2023-12-05 DIAGNOSIS — Z79.1 NSAID LONG-TERM USE: ICD-10-CM

## 2023-12-05 PROCEDURE — 99214 OFFICE O/P EST MOD 30 MIN: CPT | Performed by: PHYSICIAN ASSISTANT

## 2023-12-05 RX ORDER — VENLAFAXINE HYDROCHLORIDE 37.5 MG/1
CAPSULE, EXTENDED RELEASE ORAL
COMMUNITY
Start: 2023-11-27

## 2023-12-05 RX ORDER — BUSPIRONE HYDROCHLORIDE 10 MG/1
10 TABLET ORAL 2 TIMES DAILY
COMMUNITY
Start: 2023-10-20

## 2023-12-05 RX ORDER — LOSARTAN POTASSIUM 25 MG/1
TABLET ORAL
COMMUNITY
Start: 2023-11-11

## 2023-12-05 RX ORDER — GABAPENTIN 100 MG/1
200 CAPSULE ORAL 2 TIMES DAILY
COMMUNITY
Start: 2023-11-02

## 2023-12-05 RX ORDER — AZITHROMYCIN 250 MG/1
TABLET, FILM COATED ORAL
COMMUNITY
Start: 2023-10-18

## 2023-12-05 RX ORDER — SUCRALFATE 1 G/1
1 TABLET ORAL 3 TIMES DAILY
Qty: 42 TABLET | Refills: 1 | Status: SHIPPED | OUTPATIENT
Start: 2023-12-05 | End: 2023-12-19

## 2023-12-05 RX ORDER — VORTIOXETINE 10 MG/1
10 TABLET, FILM COATED ORAL EVERY MORNING
COMMUNITY
Start: 2023-11-22

## 2023-12-05 RX ORDER — PANTOPRAZOLE SODIUM 40 MG/1
40 TABLET, DELAYED RELEASE ORAL 2 TIMES DAILY
Qty: 60 TABLET | Refills: 2 | Status: SHIPPED | OUTPATIENT
Start: 2023-12-05

## 2023-12-05 RX ORDER — VENLAFAXINE HYDROCHLORIDE 75 MG/1
75 TABLET, EXTENDED RELEASE ORAL
COMMUNITY
Start: 2023-11-03

## 2023-12-05 RX ORDER — ATORVASTATIN CALCIUM 40 MG/1
TABLET, FILM COATED ORAL
COMMUNITY
Start: 2023-11-24

## 2023-12-05 RX ORDER — VORTIOXETINE 20 MG/1
20 TABLET, FILM COATED ORAL EVERY MORNING
COMMUNITY
Start: 2023-11-27

## 2023-12-05 NOTE — PROGRESS NOTES
[facility]  Elyssa Park 39 y.o. female MRN: [id number]       CC: Chest pain    HPI: Aleja Zavaleta is a 69-year-old female with history of hypothyroidism, irritable bowel syndrome, hypertension, pseudotumor cerebri and fibromyalgia. Patient presents to the office for chest pain that has been ongoing since August.  She was seen in the emergency room, and had negative troponin and EKG. Patient reports that while she was in the emergency room, viscous lidocaine was helpful. She has associated odynophagia. She was referred back to GI. Patient had thought that her symptoms would have resolved by now, but still persistent. When asked about NSAID use, patient reports that she will take 4 tablets of an NSAID at a time given her history of fibromyalgia and pseudotumor cerebri. Patient denies signs or GI bleeding or unintentional weight loss. Denies chest pain with activity or shortness of breath. She is status post cholecystectomy. Patient's last endoscopy was performed in 2019 with bariatric surgery revealing mild antral gastritis and a small hiatal hernia. Biopsies were negative for H. pylori. Review of Systems:    CONSTITUTIONAL: Denies any fever, chills, or rigors. Good appetite, and no recent weight loss. HEENT: No earache or tinnitus. Denies hearing loss or visual disturbances. CARDIOVASCULAR: No chest pain or palpitations. RESPIRATORY: Denies any cough, hemoptysis, shortness of breath or dyspnea on exertion. GASTROINTESTINAL: As noted in the History of Present Illness. GENITOURINARY: No problems with urination. Denies any hematuria or dysuria. NEUROLOGIC: No dizziness or vertigo, denies headaches. MUSCULOSKELETAL: Denies any muscle or joint pain. SKIN: Denies skin rashes or itching. ENDOCRINE: Denies excessive thirst. Denies intolerance to heat or cold. PSYCHOSOCIAL: Denies depression or anxiety. Denies any recent memory loss.        Current Outpatient Medications Medication Sig Dispense Refill    atorvastatin (LIPITOR) 40 mg tablet TAKE 1 TABLET BY MOUTH EVERY DAY AT NIGHT      azithromycin (ZITHROMAX) 250 mg tablet Take as directed      busPIRone (BUSPAR) 10 mg tablet Take 10 mg by mouth 2 (two) times a day      busPIRone (BUSPAR) 5 mg tablet Take 1 tablet by mouth 2 (two) times a day      gabapentin (NEURONTIN) 100 mg capsule Take 200 mg by mouth 2 (two) times a day      hydrOXYzine HCL (ATARAX) 50 mg tablet TAKE 1 TABLET BY MOUTH AT BEDTIME AS NEEDED FOR ANXIETY OR FOR SLEEP      losartan (COZAAR) 25 mg tablet TAKE 1 TABLET (25 MG TOTAL) BY MOUTH DAILY. rimegepant sulfate (Nurtec) 75 mg TBDP       semaglutide, 0.25 or 0.5 mg/dose, (Ozempic, 0.25 or 0.5 MG/DOSE,) 2 mg/3 mL injection pen INJECT 0.5MG UNDER THE SKIN EVERY 7 DAYS      acetaminophen-codeine (TYLENOL with CODEINE #3) 300-30 MG per tablet Take 1 tablet by mouth      acetaZOLAMIDE (DIAMOX) 250 mg tablet TAKE 2 TABLET TWICE DAILY 120 tablet 6    Albuterol Sulfate (VENTOLIN HFA IN) Ventolin HFA 90 mcg/actuation aerosol inhaler      amLODIPine (NORVASC) 10 mg tablet Take 1 tablet (10 mg total) by mouth daily 21 tablet 0    amoxicillin (AMOXIL) 500 mg capsule TAKE 2 CAPSULES BY MOUTH NOW, THEN TAKE 1 CAPSULE EVERY 8 HOURS UNTIL FINISHED      amphetamine-dextroamphetamine (ADDERALL) 10 mg tablet Take 10 mg by mouth 2 (two) times a day      atorvastatin (LIPITOR) 20 mg tablet atorvastatin 20 mg tablet      azelastine (ASTELIN) 0.1 % nasal spray SPRAY 1-2 SPRAYS INTO EACH NOSTRIL 2 (TWO) TIMES A DAY.  USE IN EACH NOSTRIL AS DIRECTED      Azelastine-Fluticasone 137-50 MCG/ACT SUSP 2 spray(s) BID prn      BD PEN NEEDLE ANDREA U/F 32G X 4 MM MISC ONCE DAILY  4    cyclobenzaprine (FLEXERIL) 10 mg tablet As needed      cycloSPORINE (Restasis) 0.05 % ophthalmic emulsion INSTILL 1 DROP INTO EACH EYE TWICE DAILY      diazepam (VALIUM) 5 mg tablet       Diethylpropion HCl 25 MG TABS 1 PO TID 1 hour AC 90 tablet 1 doxycycline (ADOXA) 100 MG tablet Take 1 tablet by mouth 2 (two) times a day      dulaglutide (Trulicity) 6.12 CF/3.8RZ injection INJECT 0.75 MG UNDER THE SKIN EVERY 7 DAYS.       DULoxetine (CYMBALTA) 30 mg delayed release capsule duloxetine 30 mg capsule,delayed release      ergocalciferol (VITAMIN D2) 50,000 units ergocalciferol (vitamin D2) 1,250 mcg (50,000 unit) capsule      esomeprazole (NexIUM) 40 MG capsule TAKE 1 CAPSULE BY MOUTH EVERY DAY 90 capsule 3    fluticasone (Flovent HFA) 110 MCG/ACT inhaler Inhale 2 puffs 2 (two) times a day      gabapentin (NEURONTIN) 300 mg capsule gabapentin 300 mg capsule      ibuprofen (MOTRIN) 800 mg tablet ibuprofen 800 mg tablet      Insulin Pen Needle 32G X 6 MM MISC BD Ultra-Fine Sherrill Pen Needle 32 gauge x 5/32"      levothyroxine 88 mcg tablet Take 1 tablet by mouth daily      LORazepam (ATIVAN) 0.5 mg tablet Take 1 tablet by mouth 2 (two) times a day as needed      meclizine (ANTIVERT) 25 mg tablet TAKE 1 TABLET BY MOUTH THREE TIMES A DAY AS NEEDED FOR DIZZINESS  1    metFORMIN (GLUCOPHAGE-XR) 500 mg 24 hr tablet TAKE 1 TABLET BY MOUTH TWICE A DAY WITH MEALS      methylphenidate (RITALIN) 20 MG tablet TAKE 1 TABLET BY MOUTH TWICE A DAY FOR ONGOING THERAPY      metoclopramide (Reglan) 10 mg tablet Take 1 tablet (10 mg total) by mouth 3 (three) times a day as needed (nausea, headache) 15 tablet 0    mirtazapine (REMERON) 7.5 MG tablet Take 1 tablet (7.5 mg total) by mouth daily at bedtime for 30 days 30 tablet 0    montelukast (SINGULAIR) 10 mg tablet Take 10 mg by mouth daily  1    Multiple Vitamin (MULTI-VITAMIN DAILY) TABS Take 1 tablet by mouth daily      NAPROXEN 375 MG TBEC Take 375 mg by mouth 2 (two) times a day  2    ondansetron (ZOFRAN-ODT) 4 mg disintegrating tablet Take 1 tablet (4 mg total) by mouth every 8 (eight) hours as needed for nausea for up to 3 days 12 tablet 0    oxyCODONE-acetaminophen (PERCOCET) 5-325 mg per tablet Take 1 tablet by mouth every 4-6 hours as needed  0    pantoprazole (PROTONIX) 40 mg tablet Take 1 tablet (40 mg total) by mouth daily 20 tablet 0    prednisoLONE acetate (PRED FORTE) 1 % ophthalmic suspension Administer 4 drops to both eyes 2 (two) times a day 5 mL 3    predniSONE 20 mg tablet TAKE 2 TABS DAILY X 7 DAYS      Probiotic Product (PROBIOTIC-10 PO) Take by mouth      sitaGLIPtin (Januvia) 25 mg tablet TAKE 1 TABLET (25 MG TOTAL) BY MOUTH DAILY. sucralfate (CARAFATE) 1 g tablet Take 1 tablet (1 g total) by mouth 4 (four) times a day 20 tablet 0    topiramate (TOPAMAX) 50 MG tablet Take 1 tablet by mouth 2 (two) times a day      venlafaxine (EFFEXOR) 75 mg tablet venlafaxine 75 mg tablet      venlafaxine (EFFEXOR-XR) 150 mg 24 hr capsule Take 150 mg by mouth daily      Vortioxetine HBr (Trintellix) 5 MG tablet Take 1 tablet by mouth every morning       No current facility-administered medications for this visit. Past Medical History:   Diagnosis Date    Acid reflux     Anxiety     Asthma     Depression     Fibromyalgia     Fibromyalgia     Headache     Hyperlipidemia     Hypothyroidism     IBS (irritable bowel syndrome)     Infertility, female     Insulin resistance     Obesity (BMI 30-39. 9)     Occipital neuralgia     Papilledema     PCOS (polycystic ovarian syndrome)     Pseudotumor cerebri     Tinnitus     Tremor      Past Surgical History:   Procedure Laterality Date    ANKLE FRACTURE SURGERY Right     hardware     SECTION      CHOLECYSTECTOMY      DILATION AND CURETTAGE OF UTERUS      ENDOMETRIAL BIOPSY      HYSTERECTOMY      TONSILLECTOMY AND ADENOIDECTOMY      UMBILICAL HERNIA REPAIR       Social History     Socioeconomic History    Marital status: /Civil Union     Spouse name: Not on file    Number of children: Not on file    Years of education: Not on file    Highest education level: Not on file   Occupational History    Not on file   Tobacco Use    Smoking status: Never    Smokeless tobacco: Never Substance and Sexual Activity    Alcohol use: Yes     Alcohol/week: 1.0 standard drink of alcohol     Types: 1 Standard drinks or equivalent per week     Comment: social    Drug use: No    Sexual activity: Not on file   Other Topics Concern    Not on file   Social History Narrative    Not on file     Social Determinants of Health     Financial Resource Strain: Not on file   Food Insecurity: Not on file   Transportation Needs: Not on file   Physical Activity: Not on file   Stress: Not on file   Social Connections: Not on file   Intimate Partner Violence: Not on file   Housing Stability: Not on file     Family History   Problem Relation Age of Onset    Hypertension Mother     Hypothyroidism Mother     Hypertension Father     Hypothyroidism Father     Insulin resistance Father     Hypothyroidism Sister             PHYSICAL EXAM:    There were no vitals filed for this visit. General Appearance:   Alert and oriented x 3. Cooperative, and in no respiratory distress   HEENT:   Normocephalic, atraumatic, anicteric.      Neck:  Supple, symmetrical, trachea midline   Lungs:   Clear to auscultation bilaterally    Heart[de-identified]   Regular rate and rhythm   Abdomen:   Soft, non-tender, non-distended; normal bowel sounds; no masses, no organomegaly    Genitalia:   Deferred    Rectal:   Deferred    Extremities:  No cyanosis, clubbing or edema    Pulses:  2+ and symmetric all extremities    Skin:  Skin color, texture, turgor normal, no rashes or lesions    Lymph nodes:  No palpable cervical or supraclavicular lymphadenopathy        Lab Results:   Results from last 6 Months   Lab Units 08/13/23  1829   WBC Thousand/uL 11.97*   HEMOGLOBIN g/dL 12.8   HEMATOCRIT % 41.2   PLATELETS Thousands/uL 324   NEUTROS PCT % 68   LYMPHS PCT % 24   MONOS PCT % 5   EOS PCT % 2     Results from last 6 Months   Lab Units 08/13/23  1829   POTASSIUM mmol/L 3.8   CHLORIDE mmol/L 108   CO2 mmol/L 21   BUN mg/dL 14   CREATININE mg/dL 0.78   CALCIUM mg/dL 8.8 ALK PHOS U/L 97   ALT U/L 17   AST U/L 16               Imaging Studies:   XR chest 2 views    Result Date: 8/14/2023  Impression: No acute cardiopulmonary disease. Workstation performed: [id number]       ASSESSMENT and PLAN:      1) NSAID use, chest pain - Concerned about NSAID use triggering formation of peptic ulcer disease. Emergency room, patient had normal EKG and negative troponins.  - Plan for EGD to investigate  - Increase PPI to twice daily  - Continue Carafate, but instructed patient to crush the pill prior to ingesting and lukewarm water  - Ideally, patient should avoid NSAIDs  - Further recommendations based on above  - Patient will require a colonoscopy at age 38, September 2024      Follow up for endoscopy. Portions of the record may have been created with voice recognition software. Occasional wrong word or "sound a like" substitutions may have occurred due to the inherent limitations of voice recognition software. Read the chart carefully and recognize, using context, where substitutions have occurred.

## 2023-12-05 NOTE — PATIENT INSTRUCTIONS
Scheduled date of EGD(as of today):12/21/23  Physician performing EGD:Nakul   Location of EGD:[place]  Instructions reviewed with patient by:Neha MACKEY  Clearances:  none    Diabetic meds discussed

## 2023-12-13 ENCOUNTER — TELEPHONE (OUTPATIENT)
Age: 43
End: 2023-12-13

## 2023-12-13 ENCOUNTER — NURSE TRIAGE (OUTPATIENT)
Age: 43
End: 2023-12-13

## 2023-12-13 NOTE — TELEPHONE ENCOUNTER
Patient calling stating she is not feeling better and medication is not working and need  As she couldn't go to work.  Transferred to the nurse

## 2023-12-13 NOTE — LETTER
December 13, 2023     Patient: Chito Delacruz   YOB: 1980           To whom it may concern:    Janneth Morejon is under my professional care. Adilia Robles was unable to work on 12/13/23 due to her severe Gastroesophageal reflux disease. She may return to work on 12/14/23. If you have any questions or concerns, please do not hesitate to contact me.     Sincerely,  Sammi Elizondo PA-C

## 2023-12-13 NOTE — TELEPHONE ENCOUNTER
Please advise     Pt calling in, she is having ongoing symptoms of acid reflux, regurgitation, chest pain and belching. She is on pantoprazole 40 mg BID, carafate TID, reglan but not helping much. She is having diarrhea 10 x a day low grade fever 100.4,100.2, and now 99- she is feeling terrible as well. Only able to drink broth. She is frustrated with her symptoms and is missing work- asking for a work excuse note email to her at  [email or web address]     She is asking fi there is a way she can have EGD done sooner than 12/21- she explains she is not sure she can wait that long to find out what is causing this pain and vomiting. Reason for Disposition  • Patient says chest pain feels exactly the same as previously diagnosed 'heartburn' and describes burning in chest and accompanying sour taste in mouth    Answer Assessment - Initial Assessment Questions  1. LOCATION: "Where does it hurt?"        chest  2. RADIATION: "Does the pain go anywhere else?" (e.g., into neck, jaw, arms, back)      no  3. ONSET: "When did the chest pain begin?" (Minutes, hours or days)       ongoing  4. PATTERN "Does the pain come and go, or has it been constant since it started?"  "Does it get worse with exertion?"       Constant   5. DURATION: "How long does it last" (e.g., seconds, minutes, hours)      N/a  6. SEVERITY: "How bad is the pain?"  (e.g., Scale 1-10; mild, moderate, or severe)     - MILD (1-3): doesn't interfere with normal activities      - MODERATE (4-7): interferes with normal activities or awakens from sleep     - SEVERE (8-10): excruciating pain, unable to do any normal activities        Moderate   7. CARDIAC RISK FACTORS: "Do you have any history of heart problems or risk factors for heart disease?" (e.g., angina, prior heart attack; diabetes, high blood pressure, high cholesterol, smoker, or strong family history of heart disease)      No was check in the ED   8.  PULMONARY RISK FACTORS: "Do you have any history of lung disease?"  (e.g., blood clots in lung, asthma, emphysema, birth control pills)      N/a  9. CAUSE: "What do you think is causing the chest pain?"      Gerd   10.  OTHER SYMPTOMS: "Do you have any other symptoms?" (e.g., dizziness, nausea, vomiting, sweating, fever, difficulty breathing, cough)        Vomiting, low grade fever and diarrhea   nstrual period?"    Protocols used: Chest Pain-ADULT-[place]

## 2023-12-13 NOTE — TELEPHONE ENCOUNTER
Er reflux should not be causing her to have temperature and diarrhea. May have a gastroenteritis or sadly seen COVID cause these symptoms. Advise Pepto Bismol liquid for both diarrhea and heartburn treatment. Please provide her a work note. Thank you! Velia Johnson, please keep her on a cancellation list. If she has infection, should ideally wait.

## 2023-12-14 ENCOUNTER — TELEPHONE (OUTPATIENT)
Dept: GASTROENTEROLOGY | Facility: CLINIC | Age: 43
End: 2023-12-14

## 2023-12-14 NOTE — TELEPHONE ENCOUNTER
----- Message from Armen Shoemaker sent at 12/14/2023 10:39 AM EST -----  Regarding: FW: Dr landon Clark: [phone]    ----- Message -----  From: Annette Lamb  Sent: 12/14/2023  10:31 AM EST  To: [facility]  Subject: Dr note                                          December 12th and December 14th

## 2023-12-14 NOTE — LETTER
December 14, 2023         Patient: Jeronimo Ormond   YOB: 1980   Date of Visit: To whom it may concern:     Oxana Almanzar is under my professional care. Max Olivares was unable to work on 12/12/23 and 12/14/2023 due to her severe Gastroesophageal reflux disease. If you have any questions or concerns, please do not hesitate to contact me.      Sincerely,  Caitlin Henderson PA-C

## 2023-12-18 ENCOUNTER — TELEPHONE (OUTPATIENT)
Age: 43
End: 2023-12-18

## 2023-12-18 ENCOUNTER — NURSE TRIAGE (OUTPATIENT)
Age: 43
End: 2023-12-18

## 2023-12-18 NOTE — TELEPHONE ENCOUNTER
Left message to see how the patient is feeling to see if she needs to reschedule the procedure or not.

## 2023-12-18 NOTE — TELEPHONE ENCOUNTER
Called patient and got VM. LMOM for patient to call office if she would lke something called in  for nausea if she would like as per Tess. Left office # for patient to return phone call.

## 2023-12-18 NOTE — TELEPHONE ENCOUNTER
Left message advising patient that we cancelled her procedure and she would have to wait 4 weeks to reschedule her procedure.

## 2023-12-18 NOTE — TELEPHONE ENCOUNTER
Pt. Called back in when discussing recommendation, pt. Was getting another call from [facility] and disconnected the call because she was afraid it was the doctor office, did not get to finish the call with the patient

## 2023-12-18 NOTE — TELEPHONE ENCOUNTER
Patient contacted office returning phone calls. Patient was very frustrated over the phone. Call transferred to nurse triage as Keya was unavailable to assist.

## 2023-12-18 NOTE — TELEPHONE ENCOUNTER
Called and spoke to patient  . Gave patient message as per liset. Patient is asking to speak with Liset. Will route message to PA

## 2023-12-18 NOTE — TELEPHONE ENCOUNTER
Patients GI provider:  JOVAN Fall    Number to return call: [phone]    Reason for call: Pt calling to advised she tested positive for covid on Fri 12/15/2023. Needs to resched EGD, advised she is in a lot of pain and would not like to push this out. Transferred to Dominga in triage for further assistance regarding pain. Please review and reach out to pt to help her resched EGD.    Scheduled procedure/appointment date if applicable: Procedure 12/21/2023

## 2023-12-18 NOTE — TELEPHONE ENCOUNTER
Last OV:12/5/23  Last Colonoscopy: n/a  Last EGD: 12/21/23- needs to reschedule due to being + COVID on 12/15/23    Next OV: n/a        Pt. Tested + COVID on Friday, scheduled for EGD on 12/21/23, Vomiting, abdominal pain, taking Protonix and Carafate with no relief, pt. Has chills today and a temp of 100 F , pt. Reports she does not feel well, pt. Has tried otc antacid chews with no relief, pain from throat to under bra area, pt. C/o tightness, c/o nausea and vomiting, pt. Having a hard time taking Carafate and trouble eating because of nausea , pt. Can only eat foods if it is temperature wise warm, belching that causes pain, pt. Eating litle, advised to try gaviscon otc up to 4 x daily, pt. Agreeable to try, any further recommendation please advise       Reason for Disposition   The patient has reflux    Answer Questions - Initial Assesment - Gerd Recurrent  When did your symptoms start: ongoing    How often are you experiencing symptoms: daily    When are your symptoms occurring: all day    Are you currently taking: Gerd meds: PPI: yes    Have you tried any OTC medications: Tums    Have any OTC medications resolved or lessened your symptoms: no    What recent testing has the patient had: scheduled for EGD on 12/21/23 but needs to cancel because she is + COVID on 12/15/23    Are you following the diet and lifestyle modifications: yes, pt. Reports she doesn't eat much     Any recent changes in your bowel habits: no    Protocols used: GERD

## 2023-12-19 DIAGNOSIS — R13.19 ESOPHAGEAL DYSPHAGIA: Primary | ICD-10-CM

## 2023-12-19 DIAGNOSIS — K21.9 GASTROESOPHAGEAL REFLUX DISEASE, UNSPECIFIED WHETHER ESOPHAGITIS PRESENT: ICD-10-CM

## 2023-12-19 DIAGNOSIS — R07.9 CHEST PAIN, UNSPECIFIED TYPE: ICD-10-CM

## 2023-12-19 DIAGNOSIS — Z79.1 NSAID LONG-TERM USE: ICD-10-CM

## 2023-12-19 RX ORDER — FAMOTIDINE 40 MG/1
40 TABLET, FILM COATED ORAL 2 TIMES DAILY PRN
Qty: 60 TABLET | Refills: 2 | Status: SHIPPED | OUTPATIENT
Start: 2023-12-19

## 2023-12-19 RX ORDER — ESOMEPRAZOLE MAGNESIUM 40 MG/1
40 CAPSULE, DELAYED RELEASE ORAL
Qty: 180 CAPSULE | Refills: 0 | Status: SHIPPED | OUTPATIENT
Start: 2023-12-19 | End: 2024-03-18

## 2023-12-19 RX ORDER — LIDOCAINE HYDROCHLORIDE 20 MG/ML
15 SOLUTION OROPHARYNGEAL 4 TIMES DAILY PRN
Qty: 100 ML | Refills: 0 | Status: SHIPPED | OUTPATIENT
Start: 2023-12-19

## 2023-12-19 NOTE — TELEPHONE ENCOUNTER
Spoke with patient, and she appreciated the phone call. She reports that her COVID symptoms are improving, but she still has background GI issues which we discussed recently. She voices understanding of reschedule her endoscopy, which is fortunately for her January 12 for her safety per anesthesia guidelines.  In the meantime, she does not feel that pantoprazole has been working well, we will switch her to Nexium twice a day in addition to lidocaine.  She will also go for a barium swallow to evaluate for esophageal spasm.  If she has worsening chest pain, shortness of breath, dizziness, etc. she will have to go to the emergency room.

## 2023-12-29 ENCOUNTER — HOSPITAL ENCOUNTER (OUTPATIENT)
Dept: RADIOLOGY | Facility: HOSPITAL | Age: 43
End: 2023-12-29
Payer: COMMERCIAL

## 2023-12-29 DIAGNOSIS — R07.9 CHEST PAIN, UNSPECIFIED TYPE: ICD-10-CM

## 2023-12-29 DIAGNOSIS — K21.9 GASTROESOPHAGEAL REFLUX DISEASE, UNSPECIFIED WHETHER ESOPHAGITIS PRESENT: ICD-10-CM

## 2023-12-29 DIAGNOSIS — R13.19 ESOPHAGEAL DYSPHAGIA: ICD-10-CM

## 2023-12-29 DIAGNOSIS — Z79.1 NSAID LONG-TERM USE: ICD-10-CM

## 2023-12-29 PROCEDURE — 74220 X-RAY XM ESOPHAGUS 1CNTRST: CPT

## 2024-01-01 DIAGNOSIS — R05.9 COUGH, UNSPECIFIED TYPE: ICD-10-CM

## 2024-01-01 DIAGNOSIS — R13.19 ESOPHAGEAL DYSPHAGIA: Primary | ICD-10-CM

## 2024-01-12 ENCOUNTER — ANESTHESIA (OUTPATIENT)
Dept: GASTROENTEROLOGY | Facility: HOSPITAL | Age: 44
End: 2024-01-12

## 2024-01-12 ENCOUNTER — ANESTHESIA EVENT (OUTPATIENT)
Dept: GASTROENTEROLOGY | Facility: HOSPITAL | Age: 44
End: 2024-01-12

## 2024-01-12 ENCOUNTER — HOSPITAL ENCOUNTER (OUTPATIENT)
Dept: GASTROENTEROLOGY | Facility: HOSPITAL | Age: 44
Setting detail: OUTPATIENT SURGERY
Discharge: HOME | End: 2024-01-12
Payer: COMMERCIAL

## 2024-01-12 VITALS
SYSTOLIC BLOOD PRESSURE: 111 MMHG | DIASTOLIC BLOOD PRESSURE: 69 MMHG | TEMPERATURE: 98 F | HEART RATE: 110 BPM | RESPIRATION RATE: 18 BRPM | OXYGEN SATURATION: 100 %

## 2024-01-12 DIAGNOSIS — R13.19 ESOPHAGEAL DYSPHAGIA: ICD-10-CM

## 2024-01-12 DIAGNOSIS — K21.9 GASTROESOPHAGEAL REFLUX DISEASE, UNSPECIFIED WHETHER ESOPHAGITIS PRESENT: ICD-10-CM

## 2024-01-12 DIAGNOSIS — R07.9 CHEST PAIN, UNSPECIFIED TYPE: ICD-10-CM

## 2024-01-12 DIAGNOSIS — Z79.1 NSAID LONG-TERM USE: ICD-10-CM

## 2024-01-12 PROBLEM — E66.813 CLASS 3 SEVERE OBESITY IN ADULT (HCC): Status: ACTIVE | Noted: 2019-09-27

## 2024-01-12 PROBLEM — E66.01 CLASS 3 SEVERE OBESITY IN ADULT (HCC): Status: ACTIVE | Noted: 2019-09-27

## 2024-01-12 PROCEDURE — 88305 TISSUE EXAM BY PATHOLOGIST: CPT | Performed by: PATHOLOGY

## 2024-01-12 PROCEDURE — 43239 EGD BIOPSY SINGLE/MULTIPLE: CPT | Performed by: INTERNAL MEDICINE

## 2024-01-12 RX ORDER — LIDOCAINE HYDROCHLORIDE 20 MG/ML
INJECTION, SOLUTION EPIDURAL; INFILTRATION; INTRACAUDAL; PERINEURAL AS NEEDED
Status: DISCONTINUED | OUTPATIENT
Start: 2024-01-12 | End: 2024-01-12

## 2024-01-12 RX ORDER — SODIUM CHLORIDE, SODIUM LACTATE, POTASSIUM CHLORIDE, CALCIUM CHLORIDE 600; 310; 30; 20 MG/100ML; MG/100ML; MG/100ML; MG/100ML
125 INJECTION, SOLUTION INTRAVENOUS CONTINUOUS
Status: CANCELLED | OUTPATIENT
Start: 2024-01-12

## 2024-01-12 RX ORDER — SODIUM CHLORIDE, SODIUM LACTATE, POTASSIUM CHLORIDE, CALCIUM CHLORIDE 600; 310; 30; 20 MG/100ML; MG/100ML; MG/100ML; MG/100ML
INJECTION, SOLUTION INTRAVENOUS CONTINUOUS PRN
Status: DISCONTINUED | OUTPATIENT
Start: 2024-01-12 | End: 2024-01-12

## 2024-01-12 RX ORDER — PROPOFOL 10 MG/ML
INJECTION, EMULSION INTRAVENOUS AS NEEDED
Status: DISCONTINUED | OUTPATIENT
Start: 2024-01-12 | End: 2024-01-12

## 2024-01-12 RX ADMIN — LIDOCAINE HYDROCHLORIDE 100 MG: 20 INJECTION, SOLUTION EPIDURAL; INFILTRATION; INTRACAUDAL; PERINEURAL at 11:48

## 2024-01-12 RX ADMIN — SODIUM CHLORIDE, SODIUM LACTATE, POTASSIUM CHLORIDE, AND CALCIUM CHLORIDE: .6; .31; .03; .02 INJECTION, SOLUTION INTRAVENOUS at 11:13

## 2024-01-12 RX ADMIN — PROPOFOL 100 MG: 10 INJECTION, EMULSION INTRAVENOUS at 11:53

## 2024-01-12 RX ADMIN — PROPOFOL 50 MG: 10 INJECTION, EMULSION INTRAVENOUS at 11:52

## 2024-01-12 RX ADMIN — PROPOFOL 150 MG: 10 INJECTION, EMULSION INTRAVENOUS at 11:51

## 2024-01-12 NOTE — ANESTHESIA PREPROCEDURE EVALUATION
Procedure:  EGD    Relevant Problems   CARDIO   (+) Benign hypertension      ENDO   (+) Hypothyroidism   (+) Type 2 diabetes mellitus (HCC)      MUSCULOSKELETAL   (+) Fibromyalgia      NEURO/PSYCH   (+) Depression with anxiety   (+) Fibromyalgia      Other   (+) Class 3 severe obesity in adult (HCC)        Physical Exam    Airway    Mallampati score: III  TM Distance: >3 FB  Neck ROM: full     Dental   Comment: Denies loose teeth     Cardiovascular  Cardiovascular exam normal    Pulmonary  Pulmonary exam normal     Other Findings  Portions of exam deferred due to low yield and/or unknown COVID statuspost-pubertal.      Anesthesia Plan  ASA Score- 2     Anesthesia Type- IV sedation with anesthesia with ASA Monitors.         Additional Monitors:     Airway Plan:            Plan Factors-Exercise tolerance (METS): >4 METS.    Chart reviewed.   Existing labs reviewed. Patient summary reviewed.    Patient is not a current smoker.              Induction- intravenous.    Postoperative Plan-     Informed Consent- Anesthetic plan and risks discussed with patient.  I personally reviewed this patient with the CRNA. Discussed and agreed on the Anesthesia Plan with the CRNA..

## 2024-01-12 NOTE — H&P
History and Physical -  [facility]  Kaleigh Hernandez 43 y.o. female MRN: [id number]                  HPI: Kaleigh Hernandez is a 43 y.o. year old female who presents for egd for evaluation of abdominal pain      REVIEW OF SYSTEMS: Per the HPI, and otherwise unremarkable.    Historical Information   Past Medical History:   Diagnosis Date    Acid reflux     Anxiety     Asthma     Depression     Fibromyalgia     Fibromyalgia     Headache     Hyperlipidemia     Hypothyroidism     IBS (irritable bowel syndrome)     Infertility, female     Insulin resistance     Obesity (BMI 30-39.9)     Occipital neuralgia     Papilledema     PCOS (polycystic ovarian syndrome)     Pseudotumor cerebri     Tinnitus     Tremor      Past Surgical History:   Procedure Laterality Date    ANKLE FRACTURE SURGERY Right     hardware     SECTION      CHOLECYSTECTOMY      DILATION AND CURETTAGE OF UTERUS      ENDOMETRIAL BIOPSY      HYSTERECTOMY      TONSILLECTOMY AND ADENOIDECTOMY      UMBILICAL HERNIA REPAIR       Social History   Social History     Substance and Sexual Activity   Alcohol Use Yes    Alcohol/week: 1.0 standard drink of alcohol    Types: 1 Standard drinks or equivalent per week    Comment: social     Social History     Substance and Sexual Activity   Drug Use No     Social History     Tobacco Use   Smoking Status Never   Smokeless Tobacco Never     Family History   Problem Relation Age of Onset    Hypertension Mother     Hypothyroidism Mother     Hypertension Father     Hypothyroidism Father     Insulin resistance Father     Hypothyroidism Sister        Meds/Allergies     (Not in a hospital admission)      Allergies   Allergen Reactions    Fentanyl GI Intolerance, Other (See Comments) and Anaphylaxis     PT STATES DOES NOT WANT FENTANYL    Other reaction(s): Nausea and/or vomiting    PT STATES DOES NOT WANT FENTANYL - nightmare reaction    Amoxicillin-Pot Clavulanate GI Intolerance    Cefdinir GI Intolerance     Cephalosporins GI Intolerance    Erythromycin GI Intolerance    Pollen Extract     Sulfa Antibiotics Other (See Comments)     Unknown, pt was told to stay away from sulfa since child    Sulfasalazine      has ibs and vomits  has ibs and vomits         Objective     Blood pressure 111/56, pulse 92, temperature 98.5 °F (36.9 °C), temperature source Temporal, resp. rate 16, SpO2 99%, not currently breastfeeding.      PHYSICAL EXAM    /56   Pulse 92   Temp 98.5 °F (36.9 °C) (Temporal)   Resp 16   SpO2 99%       Gen: NAD  CV: RRR  CHEST: Clear  ABD: soft, NT/ND  EXT: no edema      ASSESSMENT/PLAN:  This is a 43 y.o. year old female here for endoscopy, and she is stable and optimized for her procedure.

## 2024-01-13 DIAGNOSIS — R07.9 CHEST PAIN, UNSPECIFIED TYPE: ICD-10-CM

## 2024-01-13 DIAGNOSIS — R13.19 ESOPHAGEAL DYSPHAGIA: ICD-10-CM

## 2024-01-13 DIAGNOSIS — Z79.1 NSAID LONG-TERM USE: ICD-10-CM

## 2024-01-13 DIAGNOSIS — K21.9 GASTROESOPHAGEAL REFLUX DISEASE, UNSPECIFIED WHETHER ESOPHAGITIS PRESENT: ICD-10-CM

## 2024-01-15 PROCEDURE — 88305 TISSUE EXAM BY PATHOLOGIST: CPT | Performed by: PATHOLOGY

## 2024-01-15 RX ORDER — FAMOTIDINE 40 MG/1
40 TABLET, FILM COATED ORAL 2 TIMES DAILY PRN
Qty: 180 TABLET | Refills: 1 | Status: SHIPPED | OUTPATIENT
Start: 2024-01-15 | End: 2024-01-19 | Stop reason: SDUPTHER

## 2024-01-17 ENCOUNTER — NURSE TRIAGE (OUTPATIENT)
Age: 44
End: 2024-01-17

## 2024-01-17 RX ORDER — DEXTROMETHORPHAN HYDROBROMIDE AND PROMETHAZINE HYDROCHLORIDE 15; 6.25 MG/5ML; MG/5ML
SYRUP ORAL
COMMUNITY
Start: 2024-01-15

## 2024-01-17 RX ORDER — FENOFIBRATE 145 MG/1
TABLET, COATED ORAL
COMMUNITY
Start: 2024-01-16

## 2024-01-17 NOTE — TELEPHONE ENCOUNTER
"LOV 12/05/23 w/Ayad GREENE, EGD 01/12/24 w/, Urgent 01/19/24 w/Ayad        Pt with history of IBS reports new onset intermittent epigastric gastric pain that began approximately 1800 01/16/24. Pain throughout the abdomen at sternum area and 4-7 out of 10. Pt reports nausea, dry heaving, intermittent vomiting, and intermittent burning sensation in same area. Pt describes waking up with a rash cover her face and small, red like dots that resemble hives. Pt denies constipation, diarrhea, coffee ground emesis, fever. Pt believes this to be attributed to Barium swallow 12/29/23 wherein she aspirated. Pt inquiring if this could be related to newly prescribed Doxycycline, Tylenol with codeine. Pt reports not taking any prescribed medications and only having broth. Reglan typically relieves her nausea however it currently is not providing relief. Pt is anxious and intermittently tearful during conversation. She is a a  and was unable to go in d/t abdominal pain. Pt reports hands and feet were burning and itching this morning and is inquiring if this could have been caused by Tylenol with codeine. Please see Patient Message 01/17/2024.      Alarming symptoms that would warrant ED evaluation were discussed. Urgent appointment made for 01/19.      Reason for Disposition   MILD pain (e.g., does not interfere with normal activities) and pain comes and goes (cramps) lasts > 48 hours (Exception: this same abdominal pain is a chronic symptom recurrent or ongoing AND present > 4 weeks)    Answer Assessment - Initial Assessment Questions  1. LOCATION: \"Where does it hurt?\"       Epi gastric    2. RADIATION: \"Does the pain shoot anywhere else?\" (e.g., chest, back)      Denies     3. ONSET: \"When did the pain begin?\" (e.g., minutes, hours or days ago)       6pm 01/16/24    4. SUDDEN: \"Gradual or sudden onset?\"      Gradual    5. PATTERN \"Does the pain come and go, or is it constant?\"     - " "If constant: \"Is it getting better, staying the same, or worsening?\"       (Note: Constant means the pain never goes away completely; most serious pain is constant and it progresses)      - If intermittent: \"How long does it last?\" \"Do you have pain now?\"      (Note: Intermittent means the pain goes away completely between bouts)      Intermittent     6. SEVERITY: \"How bad is the pain?\"  (e.g., Scale 1-10; mild, moderate, or severe)    - MILD (1-3): doesn't interfere with normal activities, abdomen soft and not tender to touch     - MODERATE (4-7): interferes with normal activities or awakens from sleep, tender to touch     - SEVERE (8-10): excruciating pain, doubled over, unable to do any normal activities       8        8. CAUSE: \"What do you think is causing the stomach pain?\"      Doxycycline, Tylenol with codeine, steroids  9. RELIEVING/AGGRAVATING FACTORS: \"What makes it better or worse?\" (e.g., movement, antacids, bowel movement)      Denies    10. OTHER SYMPTOMS: \"Has there been any vomiting, diarrhea, constipation, or urine problems?\"        denies    Protocols used: Abdominal Pain - Female-ADULT-[place]    "

## 2024-01-17 NOTE — TELEPHONE ENCOUNTER
Please let patient know that doxycycline and prednisone could be causing her GI upset.  I would advise her to ask her PCP to change her antibiotic regimen if possible given that doxycycline is notorious for GI symptoms.  Continue with Nexium twice a day.

## 2024-01-18 NOTE — TELEPHONE ENCOUNTER
Attempted to call pt but no answer.    VM left with message as per Jennie and office # provided for pt to call  us back if any questions.      Please let patient know that doxycycline and prednisone could be causing her GI upset.  I would advise her to ask her PCP to change her antibiotic regimen if possible given that doxycycline is notorious for GI symptoms.  Continue with Nexium twice a day.

## 2024-01-19 ENCOUNTER — TELEMEDICINE (OUTPATIENT)
Dept: GASTROENTEROLOGY | Facility: CLINIC | Age: 44
End: 2024-01-19
Payer: COMMERCIAL

## 2024-01-19 ENCOUNTER — TELEPHONE (OUTPATIENT)
Dept: GASTROENTEROLOGY | Facility: CLINIC | Age: 44
End: 2024-01-19

## 2024-01-19 DIAGNOSIS — R13.19 ESOPHAGEAL DYSPHAGIA: ICD-10-CM

## 2024-01-19 DIAGNOSIS — R07.9 CHEST PAIN, UNSPECIFIED TYPE: ICD-10-CM

## 2024-01-19 DIAGNOSIS — Z79.1 NSAID LONG-TERM USE: ICD-10-CM

## 2024-01-19 DIAGNOSIS — K21.9 GASTROESOPHAGEAL REFLUX DISEASE, UNSPECIFIED WHETHER ESOPHAGITIS PRESENT: ICD-10-CM

## 2024-01-19 DIAGNOSIS — K21.9 GASTROESOPHAGEAL REFLUX DISEASE, UNSPECIFIED WHETHER ESOPHAGITIS PRESENT: Primary | ICD-10-CM

## 2024-01-19 PROCEDURE — 99213 OFFICE O/P EST LOW 20 MIN: CPT | Performed by: PHYSICIAN ASSISTANT

## 2024-01-19 RX ORDER — HYDROCODONE POLISTIREX AND CHLORPHENIRAMINE POLISTIREX 10; 8 MG/5ML; MG/5ML
5 SUSPENSION, EXTENDED RELEASE ORAL EVERY 12 HOURS PRN
COMMUNITY
Start: 2024-01-18

## 2024-01-19 RX ORDER — FAMOTIDINE 40 MG/1
40 TABLET, FILM COATED ORAL
Qty: 180 TABLET | Refills: 1 | Status: SHIPPED | OUTPATIENT
Start: 2024-01-19

## 2024-01-19 NOTE — TELEPHONE ENCOUNTER
Attempted to call pt but no answer.    VM left letting her know that we can do a virtual appt due to the weather condition.  Office # provided for the pt to call us back in any questions or changes.

## 2024-01-19 NOTE — PROGRESS NOTES
Virtual Brief Visit    This Visit is being completed by telephone. The Patient is located at Home and in the following state in which I hold an active license [place]    The patient was identified by name and date of birth. Kaleigh Hernandez was informed that this is a telemedicine visit and that the visit is being conducted through Telephone.  My office door was closed. No one else was in the room.  She acknowledged consent and understanding of privacy and security of the video platform. The patient has agreed to participate and understands they can discontinue the visit at any time.    Patient is aware this is a billable service.       Assessment/Plan:  1. Gastroesophageal reflux disease, unspecified whether esophagitis present  2. Chest pain, unspecified type  She has been having issues with chest pain since August  Heart workup was negative  She f/u with us in December  Her EGD was delayed due to COVID but Barium swallowed showed reflux -she also had an episode of laryngeal penetration with drinking the barium during that test which has led to a myriad of respiratory issues    She is taking Nexium twice daily  She has Carafate and metoclopramide to use as needed  She has been getting slightly better  EGD on January 12 showed mild gastritis with negative biopsies  Over the past week her symptoms have become significantly exacerbated  This is likely secondary to the fact that she has been put on an antibiotic and prednisone due to her respiratory issues    Reassured patient that she will get better  Continue Nexium twice daily  Famotidine at bedtime  Carafate at least twice daily and up to 4 times daily as needed  Reglan as needed for nausea    Reminded patient that she must take her antibiotic and prednisone with food    Advised patient to hold off on Ozempic as it delayed stomach emptying it is likely exacerbating all of the underlying issues      Problem List Items Addressed This Visit    None  Visit Diagnoses        Gastroesophageal reflux disease, unspecified whether esophagitis present    -  Primary    Chest pain, unspecified type                Recent Visits  No visits were found meeting these conditions.  Showing recent visits within past 7 days and meeting all other requirements  Today's Visits  Date Type Provider Dept   01/19/24 Telephone [place]  [facility]   01/19/24 Telephone [place] [facility]   01/19/24 Telemedicine Tess Wesley PA-C [facility]   Showing today's visits and meeting all other requirements  Future Appointments  No visits were found meeting these conditions.  Showing future appointments within next 150 days and meeting all other requirements         Visit Time  Total Visit Duration: 28 minutes

## 2024-01-19 NOTE — LETTER
January 19, 2024     Kaleigh Hernandez  [address] [facility] [address]    Patient: Kaleigh Hernandez   YOB: 1980   Date of Visit: 1/19/2024       To Whom It May Concern:    Kaleigh Hernandez Was seen in my office on January 19, 2024.  Due to a serious underlying illness, she was unable to attend work yesterday, January 18, 2024 and today, January 19, 2024.          Sincerely,            Tess Wesley PA-C

## 2024-01-19 NOTE — TELEPHONE ENCOUNTER
Attempted to call pt but no answer.    Wanted to ask the pt if she would like to come to the office earlier or do a  virtual appt today with Tess.    Will call back pt later.    Unable to leave a VM because the call was disconnected.

## 2024-01-22 ENCOUNTER — TELEPHONE (OUTPATIENT)
Dept: GASTROENTEROLOGY | Facility: CLINIC | Age: 44
End: 2024-01-22

## 2024-02-16 ENCOUNTER — EVALUATION (OUTPATIENT)
Dept: PHYSICAL THERAPY | Facility: CLINIC | Age: 44
End: 2024-02-16
Payer: COMMERCIAL

## 2024-02-16 DIAGNOSIS — Z98.890 OTHER SPECIFIED POSTPROCEDURAL STATES: Primary | ICD-10-CM

## 2024-02-16 DIAGNOSIS — M79.672 LEFT FOOT PAIN: ICD-10-CM

## 2024-02-16 PROCEDURE — 97162 PT EVAL MOD COMPLEX 30 MIN: CPT

## 2024-02-16 NOTE — LETTER
2024    Jamison Banerjee DPM  [address] [facility] [address]    Patient: Kaleigh Hernandez   YOB: 1980   Date of Visit: 2024     Encounter Diagnosis     ICD-10-CM    1. Other specified postprocedural states  Z98.890       2. Left foot pain  M79.672           Dear Dr. Banerjee:    Thank you for your recent referral of Kaleigh Hernandez. Please review the attached evaluation summary from Kaleigh's recent visit.     Please verify that you agree with the plan of care by signing the attached order.     If you have any questions or concerns, please do not hesitate to call.     I sincerely appreciate the opportunity to share in the care of one of your patients and hope to have another opportunity to work with you in the near future.       Sincerely,    Eileen Gary, PT      Referring Provider:      I certify that I have read the below Plan of Care and certify the need for these services furnished under this plan of treatment while under my care.                    Jamison Banerjee DPM  [address] [facility] [address]  Via Fax: [phone]          PT Evaluation     Today's date: 2024  Patient name: Kaleigh Hernandez  : 1980  MRN: [id number]  Referring provider: Allen Banerjee MD  Dx:   Encounter Diagnosis     ICD-10-CM    1. Other specified postprocedural states  Z98.890       2. Left foot pain  M79.672           Start Time: 1335  Stop Time: 1400  Total time in clinic (min): 25 minutes    Assessment  Assessment details: Patient is a 43 y.o. female who presents to physical therapy with c/o L foot pain. Patient presents to evaluation with pain, decreased range of motion, decreased strength, and decreased tolerance to activity. HEP deferred this session due to time constraints. I discussed risks, benefits, and alternatives to treatment, and answered all patient questions to patient satisfaction. Patient presents with baseline FOTO score of 40  indicating limited tolerance/ability to complete ADLs. Patient is an appropriate candidate for skilled PT and would benefit from skilled PT services to address the aforementioned impairments, achieve goals, maximize function, and improve quality of life. Pt is in agreement with this plan.    Patient Education: activity modifications as needed, pacing of activities, importance of HEP compliance, PT prognosis/POC    Impairments: activity intolerance, impaired balance, impaired physical strength and pain with function    Goals  ST weeks  Pt will demonstrate good understanding and compliance with HEP  Pt will decrease pain to 5/10 with activity       LT weeks  Pt will improve FOTO score to > or = to 56 to indicate improved functional abilities   Pt will decrease pain to 1/10 with activity   Pt will increase ankle strength to 5/5 for improved tolerance/independence with ADLs  Pt will tolerate standing for >1 hour to be able to perform work tasks.     Plan  Patient would benefit from: PT eval and skilled physical therapy  Planned modality interventions: cryotherapy, TENS and thermotherapy: hydrocollator packs  Planned therapy interventions: manual therapy, graded motor, graded exercise, home exercise program, graded activity, functional ROM exercises, flexibility, stretching, strengthening, therapeutic activities, therapeutic exercise, therapeutic training and balance  Frequency: 2x week  Plan of Care beginning date: 2024  Plan of Care expiration date: 2024        Subjective Evaluation    History of Present Illness  Mechanism of injury: Pt is a 42 y/o female who presents to therapy with c/o of L plantar fascitis. She notes she had surgery on the R foot in 2019, since then the L foot has been bothering her and it is progressively getting worse. She had surgery in August on the left to remove a bone spur and a plantar fascia release. She notes she cannot walk on the L foot without pain. She notes  nothing helps with pain relief except rest. Pt reports she can last about an hour on her feet straight, but it is very hard.   Pain  Current pain ratin  At best pain ratin  At worst pain ratin          Objective     General Comments:      Ankle/Foot Comments   L AROM:  Df-15 pf-35 inv-30 ev-15    L great toe extension: 45 deg    L strength:  Df-4/5 pf-3+/5 (pain) inv-4/5 ev-4/5      TTP: medial arch     Sensation: intact/symmetrical     Windlass: (-)               Diagnosis: L foot pain   Precautions: hx L foot surgery, hx R foot surgery    POC Expires: 24   Re-evaluation Date: 3/15/24   FOTO Scores/Date: Goal -56; 40   Visit Count 1/10       Manuals                                Ther Ex                                                                                Neuro Re-Ed                                                               Ther Act                                                                                 Modalities

## 2024-02-16 NOTE — PROGRESS NOTES
PT Evaluation     Today's date: 2024  Patient name: Kaleigh Hernandez  : 1980  MRN: [id number]  Referring provider: Allen Banerjee MD  Dx:   Encounter Diagnosis     ICD-10-CM    1. Other specified postprocedural states  Z98.890       2. Left foot pain  M79.672           Start Time: 1335  Stop Time: 1400  Total time in clinic (min): 25 minutes    Assessment  Assessment details: Patient is a 43 y.o. female who presents to physical therapy with c/o L foot pain. Patient presents to evaluation with pain, decreased range of motion, decreased strength, and decreased tolerance to activity. HEP deferred this session due to time constraints. I discussed risks, benefits, and alternatives to treatment, and answered all patient questions to patient satisfaction. Patient presents with baseline FOTO score of 40 indicating limited tolerance/ability to complete ADLs. Patient is an appropriate candidate for skilled PT and would benefit from skilled PT services to address the aforementioned impairments, achieve goals, maximize function, and improve quality of life. Pt is in agreement with this plan.    Patient Education: activity modifications as needed, pacing of activities, importance of HEP compliance, PT prognosis/POC    Impairments: activity intolerance, impaired balance, impaired physical strength and pain with function    Goals  ST weeks  Pt will demonstrate good understanding and compliance with HEP  Pt will decrease pain to 5/10 with activity       LT weeks  Pt will improve FOTO score to > or = to 56 to indicate improved functional abilities   Pt will decrease pain to 1/10 with activity   Pt will increase ankle strength to 5/5 for improved tolerance/independence with ADLs  Pt will tolerate standing for >1 hour to be able to perform work tasks.     Plan  Patient would benefit from: PT eval and skilled physical therapy  Planned modality interventions: cryotherapy, TENS and thermotherapy: hydrocollator  packs  Planned therapy interventions: manual therapy, graded motor, graded exercise, home exercise program, graded activity, functional ROM exercises, flexibility, stretching, strengthening, therapeutic activities, therapeutic exercise, therapeutic training and balance  Frequency: 2x week  Plan of Care beginning date: 2024  Plan of Care expiration date: 2024        Subjective Evaluation    History of Present Illness  Mechanism of injury: Pt is a 42 y/o female who presents to therapy with c/o of L plantar fascitis. She notes she had surgery on the R foot in 2019, since then the L foot has been bothering her and it is progressively getting worse. She had surgery in August on the left to remove a bone spur and a plantar fascia release. She notes she cannot walk on the L foot without pain. She notes nothing helps with pain relief except rest. Pt reports she can last about an hour on her feet straight, but it is very hard.   Pain  Current pain ratin  At best pain ratin  At worst pain ratin          Objective     General Comments:      Ankle/Foot Comments   L AROM:  Df-15 pf-35 inv-30 ev-15    L great toe extension: 45 deg    L strength:  Df-4/5 pf-3+/5 (pain) inv-4/5 ev-4/5      TTP: medial arch     Sensation: intact/symmetrical     Windlass: (-)               Diagnosis: L foot pain   Precautions: hx L foot surgery, hx R foot surgery    POC Expires: 24   Re-evaluation Date: 3/15/24   FOTO Scores/Date: Goal -56; 40   Visit Count /10       Manuals                                Ther Ex                                                                                Neuro Re-Ed                                                               Ther Act                                                                                 Modalities

## 2024-02-19 ENCOUNTER — APPOINTMENT (OUTPATIENT)
Dept: PHYSICAL THERAPY | Facility: CLINIC | Age: 44
End: 2024-02-19
Payer: COMMERCIAL

## 2024-02-22 ENCOUNTER — OFFICE VISIT (OUTPATIENT)
Dept: PHYSICAL THERAPY | Facility: CLINIC | Age: 44
End: 2024-02-22
Payer: COMMERCIAL

## 2024-02-22 DIAGNOSIS — Z98.890 OTHER SPECIFIED POSTPROCEDURAL STATES: Primary | ICD-10-CM

## 2024-02-22 DIAGNOSIS — M79.672 LEFT FOOT PAIN: ICD-10-CM

## 2024-02-22 PROCEDURE — 97140 MANUAL THERAPY 1/> REGIONS: CPT

## 2024-02-22 PROCEDURE — 97110 THERAPEUTIC EXERCISES: CPT

## 2024-02-22 NOTE — PROGRESS NOTES
"Daily Note     Today's date: 2024  Patient name: Kaleigh Hernandez  : 1980  MRN: [id number]  Referring provider: Allen Banerjee MD  Dx:   Encounter Diagnosis     ICD-10-CM    1. Other specified postprocedural states  Z98.890       2. Left foot pain  M79.672           Start Time: 1500  Stop Time: 1544  Total time in clinic (min): 44 minutes    Subjective: patent reports continued pain.  Reports with walking.  Reports poor tolerance to being on feet       Objective: See treatment diary below      Assessment:  patient demonstrates fair tolerance to therapy being able to complete without reported significant increase to baseline pain levels.  Was able to introduce manual interventions to improve tissue quality of plantar along with strengthening and ROM exercises of foot and ankle.      Plan: Continue per plan of care.  Progress treatment as tolerated.       Diagnosis: L foot pain   Precautions: hx L foot surgery, hx R foot surgery    POC Expires: 24   Re-evaluation Date: 3/15/24   FOTO Scores/Date: Goal -56; 40   Visit Count 1/10 2/10      Manuals        L Plantar  IASTM  ZANE      Plantar STM and Stretch  ZANE               Ther Ex        Gastroc/Soleus Stretch w/Towel  30\"x3 ea       4 Way Ankle w/T-band  RTB 2x10 ea      [place]   X1 container      Heel/toe Raise   Seated 2x10       BAPS  L2 x 10 ea 6 way                                      Neuro Re-Ed                                                               Ther Act                                                                                 Modalities                                            "

## 2024-02-26 ENCOUNTER — OFFICE VISIT (OUTPATIENT)
Dept: PHYSICAL THERAPY | Facility: CLINIC | Age: 44
End: 2024-02-26
Payer: COMMERCIAL

## 2024-02-26 DIAGNOSIS — M79.672 LEFT FOOT PAIN: ICD-10-CM

## 2024-02-26 DIAGNOSIS — Z98.890 OTHER SPECIFIED POSTPROCEDURAL STATES: Primary | ICD-10-CM

## 2024-02-26 PROCEDURE — 97140 MANUAL THERAPY 1/> REGIONS: CPT

## 2024-02-26 PROCEDURE — 97110 THERAPEUTIC EXERCISES: CPT

## 2024-02-26 NOTE — PROGRESS NOTES
"Daily Note     Today's date: 2024  Patient name: Kaleigh Hernandez  : 1980  MRN: [id number]  Referring provider: Allen Banerjee MD  Dx:   Encounter Diagnosis     ICD-10-CM    1. Other specified postprocedural states  Z98.890       2. Left foot pain  M79.672           Start Time: 1230  Stop Time: 1310  Total time in clinic (min): 40 minutes    Subjective: pt reports severe right sided rib pain that has been present for a few days limiting her ability to walk.       Objective: See treatment diary below      Assessment: Educated patient on going tot he doctor if rib pain does not improve. Rib pain limited ability to participate in therapy this session. Progress next session as tolerated. Patient would benefit from continued PT      Plan: Continue per plan of care.      Diagnosis: L foot pain   Precautions: hx L foot surgery, hx R foot surgery    POC Expires: 24   Re-evaluation Date: 3/15/24   FOTO Scores/Date: Goal -56; 40   Visit Count 1/10 2/10 3/10     Manuals       L Plantar  IASTM  ZANE AM     Plantar STM and Stretch  ZANE  AM             Ther Ex       Gastroc/Soleus Stretch w/Towel  30\"x3 ea  30\" x 3 ea      4 Way Ankle w/T-band  RTB 2x10 ea Rtb 2x10 ea      [place]   X1 container X1 container     Heel/toe Raise   Seated 2x10  Seated 2x10     BAPS  L2 x 10 ea 6 way L2 x 10 ea 6 way     bike                                Neuro Re-Ed                                                               Ther Act                                                                                 Modalities                                              "

## 2024-02-28 ENCOUNTER — OFFICE VISIT (OUTPATIENT)
Dept: PHYSICAL THERAPY | Facility: CLINIC | Age: 44
End: 2024-02-28
Payer: COMMERCIAL

## 2024-02-28 DIAGNOSIS — M79.672 LEFT FOOT PAIN: ICD-10-CM

## 2024-02-28 DIAGNOSIS — Z98.890 OTHER SPECIFIED POSTPROCEDURAL STATES: Primary | ICD-10-CM

## 2024-02-28 PROCEDURE — 97140 MANUAL THERAPY 1/> REGIONS: CPT

## 2024-02-28 PROCEDURE — 97110 THERAPEUTIC EXERCISES: CPT

## 2024-02-28 NOTE — PROGRESS NOTES
"Daily Note     Today's date: 2024  Patient name: Kaleigh Hernandez  : 1980  MRN: [id number]  Referring provider: Allen Banerjee MD  Dx:   Encounter Diagnosis     ICD-10-CM    1. Other specified postprocedural states  Z98.890       2. Left foot pain  M79.672                      Subjective: pt reports that she is having pain in the bottom of her L foot with no complaints of pain at rest. Notes with ambulation her pain increased to a 6/10. Notes that she is more concerned with her R ribs still being sore. Also notes that the mornings are usually worse but her fott loosens up but still hurts as the day goes on.       Objective: See treatment diary below      Assessment: Ept was able to begin on the bike this session as an active warmup. Tenderness was present over her L plantar fascia that loosened up following manuals. Increased gastroc tightness also present.  Patient would benefit from continued PT      Plan: Continue per plan of care.      Diagnosis: L foot pain   Precautions: hx L foot surgery, hx R foot surgery    POC Expires: 24   Re-evaluation Date: 3/15/24   FOTO Scores/Date: Goal -56; 40   Visit Count 1/10 2/10 3/10 4/10    Manuals      L Plantar  IASTM  ZANE AM MM    Plantar STM and Stretch  ZANE  AM MM            Ther Ex       Gastroc/Soleus Stretch w/Towel  30\"x3 ea  30\" x 3 ea  30\"x3 ea    4 Way Ankle w/T-band  RTB 2x10 ea Rtb 2x10 ea  Rtb 2x10 ea    [place]   X1 container X1 container X1 container    Heel/toe Raise   Seated 2x10  Seated 2x10 Seated 2x10    BAPS  L2 x 10 ea 6 way L2 x 10 ea 6 way L2 x10 ea 6 way    bike    5'                             Neuro Re-Ed                                                               Ther Act                                                                                 Modalities                                              "

## 2024-03-07 ENCOUNTER — OFFICE VISIT (OUTPATIENT)
Dept: PHYSICAL THERAPY | Facility: CLINIC | Age: 44
End: 2024-03-07
Payer: COMMERCIAL

## 2024-03-07 DIAGNOSIS — M79.672 LEFT FOOT PAIN: ICD-10-CM

## 2024-03-07 DIAGNOSIS — Z98.890 OTHER SPECIFIED POSTPROCEDURAL STATES: Primary | ICD-10-CM

## 2024-03-07 PROCEDURE — 97140 MANUAL THERAPY 1/> REGIONS: CPT

## 2024-03-07 PROCEDURE — 97110 THERAPEUTIC EXERCISES: CPT

## 2024-03-07 NOTE — PROGRESS NOTES
"Daily Note     Today's date: 3/7/2024  Patient name: Kaleigh Hernandez  : 1980  MRN: [id number]  Referring provider: Allen Banerjee MD  Dx:   Encounter Diagnosis     ICD-10-CM    1. Other specified postprocedural states  Z98.890       2. Left foot pain  M79.672           Start Time: 1500  Stop Time: 1545  Total time in clinic (min): 45 minutes    Subjective: pt reports increased pain today due to being on her feet a lot      Objective: See treatment diary below      Assessment: Able to progress heel and toe raises to the standing position. Continued discomfort on the bottom of the foot. Tolerated treatment well. Patient would benefit from continued PT      Plan: Continue per plan of care.      Diagnosis: L foot pain   Precautions: hx L foot surgery, hx R foot surgery    POC Expires: 24   Re-evaluation Date: 3/15/24   FOTO Scores/Date: Goal -56; 40   Visit Count 1/10 2/10 3/10 4/10 510   Manuals   37   L Plantar  IASTM  ZANE AM MM AM   Plantar STM and Stretch  ZANE  AM MM AM           Ther Ex    37    Gastroc/Soleus Stretch w/Towel  30\"x3 ea  30\" x 3 ea  30\"x3 ea 30\" 3x ea    4 Way Ankle w/T-band  RTB 2x10 ea Rtb 2x10 ea  Rtb 2x10 ea Rtb 2x10 ea    [place]   X1 container X1 container X1 container X1 container    Heel/toe Raise   Seated 2x10  Seated 2x10 Seated 2x10 Standing 2x10   BAPS  L2 x 10 ea 6 way L2 x 10 ea 6 way L2 x10 ea 6 way L2 x 10 ea 6 way   bike    5'  6 min                            Neuro Re-Ed                                                               Ther Act                                                                                 Modalities                                                "

## 2024-03-13 ENCOUNTER — APPOINTMENT (OUTPATIENT)
Dept: PHYSICAL THERAPY | Facility: CLINIC | Age: 44
End: 2024-03-13
Payer: COMMERCIAL

## 2024-03-18 ENCOUNTER — APPOINTMENT (OUTPATIENT)
Dept: PHYSICAL THERAPY | Facility: CLINIC | Age: 44
End: 2024-03-18
Payer: COMMERCIAL

## 2024-03-20 ENCOUNTER — APPOINTMENT (OUTPATIENT)
Dept: PHYSICAL THERAPY | Facility: CLINIC | Age: 44
End: 2024-03-20
Payer: COMMERCIAL

## 2024-04-17 DIAGNOSIS — R07.9 CHEST PAIN: ICD-10-CM

## 2024-04-18 RX ORDER — SUCRALFATE 1 G/1
1 TABLET ORAL 3 TIMES DAILY
Qty: 42 TABLET | Refills: 0 | Status: SHIPPED | OUTPATIENT
Start: 2024-04-18 | End: 2024-05-02

## 2024-05-16 ENCOUNTER — HOSPITAL ENCOUNTER (EMERGENCY)
Facility: HOSPITAL | Age: 44
Discharge: HOME | End: 2024-05-16
Attending: EMERGENCY MEDICINE
Payer: COMMERCIAL

## 2024-05-16 ENCOUNTER — APPOINTMENT (EMERGENCY)
Dept: CT IMAGING | Facility: HOSPITAL | Age: 44
End: 2024-05-16
Payer: COMMERCIAL

## 2024-05-16 VITALS
TEMPERATURE: 97.7 F | HEART RATE: 95 BPM | OXYGEN SATURATION: 98 % | WEIGHT: 209 LBS | BODY MASS INDEX: 39.46 KG/M2 | SYSTOLIC BLOOD PRESSURE: 175 MMHG | RESPIRATION RATE: 20 BRPM | DIASTOLIC BLOOD PRESSURE: 94 MMHG | HEIGHT: 61 IN
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
## 2024-05-16 DIAGNOSIS — S09.90XA CLOSED HEAD INJURY, INITIAL ENCOUNTER: ICD-10-CM

## 2024-05-16 DIAGNOSIS — S16.1XXA CERVICAL STRAIN: ICD-10-CM

## 2024-05-16 LAB
ANION GAP SERPL CALCULATED.3IONS-SCNC: 10 MMOL/L (ref 4–13)
BASOPHILS # BLD AUTO: 0.05 THOUSANDS/ÂΜL (ref 0–0.1)
BASOPHILS NFR BLD AUTO: 1 % (ref 0–1)
BUN SERPL-MCNC: 14 MG/DL (ref 5–25)
CALCIUM SERPL-MCNC: 9.4 MG/DL (ref 8.4–10.2)
CHLORIDE SERPL-SCNC: 109 MMOL/L (ref 96–108)
CO2 SERPL-SCNC: 21 MMOL/L (ref 21–32)
CREAT SERPL-MCNC: 1.07 MG/DL (ref 0.6–1.3)
EOSINOPHIL # BLD AUTO: 0.14 THOUSAND/ÂΜL (ref 0–0.61)
EOSINOPHIL NFR BLD AUTO: 1 % (ref 0–6)
ERYTHROCYTE [DISTWIDTH] IN BLOOD BY AUTOMATED COUNT: 13.9 % (ref 11.6–15.1)
GFR SERPL CREATININE-BSD FRML MDRD: 63 ML/MIN/1.73SQ M
GLUCOSE SERPL-MCNC: 99 MG/DL (ref 65–140)
HCG SERPL QL: NEGATIVE
HCT VFR BLD AUTO: 39.9 % (ref 34.8–46.1)
HGB BLD-MCNC: 12.8 G/DL (ref 11.5–15.4)
IMM GRANULOCYTES # BLD AUTO: 0.03 THOUSAND/UL (ref 0–0.2)
IMM GRANULOCYTES NFR BLD AUTO: 0 % (ref 0–2)
LYMPHOCYTES # BLD AUTO: 1.7 THOUSANDS/ÂΜL (ref 0.6–4.47)
LYMPHOCYTES NFR BLD AUTO: 17 % (ref 14–44)
MCH RBC QN AUTO: 28.1 PG (ref 26.8–34.3)
MCHC RBC AUTO-ENTMCNC: 32.1 G/DL (ref 31.4–37.4)
MCV RBC AUTO: 88 FL (ref 82–98)
MONOCYTES # BLD AUTO: 0.61 THOUSAND/ÂΜL (ref 0.17–1.22)
MONOCYTES NFR BLD AUTO: 6 % (ref 4–12)
NEUTROPHILS # BLD AUTO: 7.32 THOUSANDS/ÂΜL (ref 1.85–7.62)
NEUTS SEG NFR BLD AUTO: 75 % (ref 43–75)
NRBC BLD AUTO-RTO: 0 /100 WBCS
PLATELET # BLD AUTO: 343 THOUSANDS/UL (ref 149–390)
PMV BLD AUTO: 9.6 FL (ref 8.9–12.7)
POTASSIUM SERPL-SCNC: 3.7 MMOL/L (ref 3.5–5.3)
RBC # BLD AUTO: 4.55 MILLION/UL (ref 3.81–5.12)
SODIUM SERPL-SCNC: 140 MMOL/L (ref 135–147)
WBC # BLD AUTO: 9.85 THOUSAND/UL (ref 4.31–10.16)

## 2024-05-16 PROCEDURE — 99284 EMERGENCY DEPT VISIT MOD MDM: CPT | Performed by: EMERGENCY MEDICINE

## 2024-05-16 PROCEDURE — 72125 CT NECK SPINE W/O DYE: CPT

## 2024-05-16 PROCEDURE — 93005 ELECTROCARDIOGRAM TRACING: CPT

## 2024-05-16 PROCEDURE — 85025 COMPLETE CBC W/AUTO DIFF WBC: CPT | Performed by: EMERGENCY MEDICINE

## 2024-05-16 PROCEDURE — 70496 CT ANGIOGRAPHY HEAD: CPT

## 2024-05-16 PROCEDURE — 84703 CHORIONIC GONADOTROPIN ASSAY: CPT | Performed by: EMERGENCY MEDICINE

## 2024-05-16 PROCEDURE — 80048 BASIC METABOLIC PNL TOTAL CA: CPT | Performed by: EMERGENCY MEDICINE

## 2024-05-16 PROCEDURE — 99284 EMERGENCY DEPT VISIT MOD MDM: CPT

## 2024-05-16 PROCEDURE — 70498 CT ANGIOGRAPHY NECK: CPT

## 2024-05-16 PROCEDURE — 36415 COLL VENOUS BLD VENIPUNCTURE: CPT | Performed by: EMERGENCY MEDICINE

## 2024-05-16 RX ADMIN — IOHEXOL 100 ML: 350 INJECTION, SOLUTION INTRAVENOUS at 19:15

## 2024-05-17 LAB
ATRIAL RATE: 90 BPM
P AXIS: 67 DEGREES
PR INTERVAL: 148 MS
QRS AXIS: 22 DEGREES
QRSD INTERVAL: 84 MS
QT INTERVAL: 368 MS
QTC INTERVAL: 450 MS
T WAVE AXIS: 61 DEGREES
VENTRICULAR RATE: 90 BPM

## 2024-05-17 PROCEDURE — 93010 ELECTROCARDIOGRAM REPORT: CPT | Performed by: INTERNAL MEDICINE
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
## 2024-06-11 NOTE — TELEPHONE ENCOUNTER
Recall information received from [facility] regarding patient's duloxetine delayed-release 30 mg capsules.     Information placed on provider's desk for further review/advisement.   Sent new work note through pt' MyChart and scanned  to pt's chart

## 2024-10-02 ENCOUNTER — OFFICE VISIT (OUTPATIENT)
Dept: GASTROENTEROLOGY | Facility: CLINIC | Age: 44
End: 2024-10-02
Payer: COMMERCIAL

## 2024-10-02 VITALS
OXYGEN SATURATION: 97 % | HEART RATE: 100 BPM | DIASTOLIC BLOOD PRESSURE: 84 MMHG | HEIGHT: 61 IN | RESPIRATION RATE: 18 BRPM | BODY MASS INDEX: 36.25 KG/M2 | WEIGHT: 192 LBS | TEMPERATURE: 97.2 F | SYSTOLIC BLOOD PRESSURE: 128 MMHG

## 2024-10-02 DIAGNOSIS — K21.9 GASTROESOPHAGEAL REFLUX DISEASE, UNSPECIFIED WHETHER ESOPHAGITIS PRESENT: ICD-10-CM

## 2024-10-02 DIAGNOSIS — Z79.1 NSAID LONG-TERM USE: ICD-10-CM

## 2024-10-02 DIAGNOSIS — R68.81 EARLY SATIETY: Primary | ICD-10-CM

## 2024-10-02 DIAGNOSIS — R07.9 CHEST PAIN, UNSPECIFIED TYPE: ICD-10-CM

## 2024-10-02 DIAGNOSIS — R13.19 ESOPHAGEAL DYSPHAGIA: ICD-10-CM

## 2024-10-02 DIAGNOSIS — Z82.49 FAMILY HISTORY OF EARLY CAD: ICD-10-CM

## 2024-10-02 PROCEDURE — 99214 OFFICE O/P EST MOD 30 MIN: CPT | Performed by: PHYSICIAN ASSISTANT

## 2024-10-02 RX ORDER — SUCRALFATE ORAL 1 G/10ML
1 SUSPENSION ORAL
Qty: 473 ML | Refills: 2 | Status: SHIPPED | OUTPATIENT
Start: 2024-10-02

## 2024-10-02 RX ORDER — BENZONATATE 200 MG/1
CAPSULE ORAL
COMMUNITY

## 2024-10-02 RX ORDER — PREDNISONE 20 MG/1
TABLET ORAL
COMMUNITY

## 2024-10-02 RX ORDER — FAMOTIDINE 40 MG/1
40 TABLET, FILM COATED ORAL
Qty: 180 TABLET | Refills: 1 | Status: SHIPPED | OUTPATIENT
Start: 2024-10-02

## 2024-10-02 RX ORDER — MOMETASONE FUROATE 220 UG/1
INHALANT RESPIRATORY (INHALATION)
COMMUNITY

## 2024-10-02 RX ORDER — ESOMEPRAZOLE MAGNESIUM 40 MG/1
40 CAPSULE, DELAYED RELEASE ORAL
Qty: 180 CAPSULE | Refills: 2 | Status: SHIPPED | OUTPATIENT
Start: 2024-10-02 | End: 2024-12-31

## 2024-10-02 RX ORDER — LEVOFLOXACIN 750 MG/1
TABLET, FILM COATED ORAL
COMMUNITY

## 2024-10-02 RX ORDER — DULOXETIN HYDROCHLORIDE 60 MG/1
CAPSULE, DELAYED RELEASE ORAL
COMMUNITY
Start: 2024-09-28

## 2024-10-02 RX ORDER — GABAPENTIN 300 MG/1
300 CAPSULE ORAL 2 TIMES DAILY
COMMUNITY
Start: 2024-09-18

## 2024-10-02 RX ORDER — CLARITHROMYCIN 500 MG
TABLET ORAL
COMMUNITY

## 2024-10-02 RX ORDER — LEVOTHYROXINE SODIUM 88 UG/1
88 TABLET ORAL DAILY
COMMUNITY
Start: 2024-08-01

## 2024-10-02 RX ORDER — SEMAGLUTIDE 2.68 MG/ML
INJECTION, SOLUTION SUBCUTANEOUS
COMMUNITY
Start: 2024-07-30

## 2024-10-02 RX ORDER — BUSPIRONE HYDROCHLORIDE 10 MG/1
10 TABLET ORAL 2 TIMES DAILY
COMMUNITY
Start: 2024-08-29

## 2024-10-02 RX ORDER — DULOXETIN HYDROCHLORIDE 30 MG/1
CAPSULE, DELAYED RELEASE ORAL
COMMUNITY
Start: 2024-07-28

## 2024-10-02 RX ORDER — GABAPENTIN 400 MG/1
400 CAPSULE ORAL 2 TIMES DAILY
COMMUNITY
Start: 2024-09-25

## 2024-10-02 RX ORDER — PREDNISONE 10 MG/1
TABLET ORAL
COMMUNITY

## 2024-10-02 NOTE — PROGRESS NOTES
[facility]  Kaleigh Hernandez 44 y.o. female MRN: [id number]       CC: Gastritis flare    HPI: Kaleigh is a 44-year-old female with history of hypothyroidism, IBS, hypertension, pseudotumor cerebri, fibromyalgia, and gastritis.  Patient was last seen in the office via telemedicine in January.  Patient was taking Nexium twice daily, Reglan and Carafate.  Patient reports that she has been off of Ozempic for 3 weeks.  Patient reports over the last 2 days she has had a flareup of chest pain and epigastric burning sensation.  Patient reports that she will need to constantly keep a small amount of food in her stomach in order to have some relief.  She reports some associated shortness of breath, but is working with [employer] for asthma and aspiration.  As a result, the patient required several prednisone courses.  Denies signs or GI bleeding.  Patient reports that she decided not to go to the emergency room as based on her previous experience she did not find it helpful.  EKG revealing normal sinus rhythm in May 2024.    Last EGD was performed in January 2024 revealing mild erosive gastritis.  Biopsies of the small bowel and stomach were negative for changes suggesting celiac disease or H. pylori.  Patient reports that she has had other young family members with history of CAD, mostly the men in her family.    Review of Systems:    CONSTITUTIONAL: Denies any fever, chills, or rigors. Good appetite, and no recent weight loss.  HEENT: No earache or tinnitus. Denies hearing loss or visual disturbances.  CARDIOVASCULAR: No chest pain or palpitations.   RESPIRATORY: Denies any cough, hemoptysis, shortness of breath or dyspnea on exertion.  GASTROINTESTINAL: As noted in the History of Present Illness.   GENITOURINARY: No problems with urination. Denies any hematuria or dysuria.  NEUROLOGIC: No dizziness or vertigo, denies headaches.   MUSCULOSKELETAL: Denies any muscle or joint pain.   SKIN:  Denies skin rashes or itching.   ENDOCRINE: Denies excessive thirst. Denies intolerance to heat or cold.  PSYCHOSOCIAL: Denies depression or anxiety. Denies any recent memory loss.       Current Outpatient Medications   Medication Sig Dispense Refill    busPIRone (BUSPAR) 10 mg tablet Take 10 mg by mouth 2 (two) times a day      DULoxetine (CYMBALTA) 30 mg delayed release capsule 1 CAP BY MOUTH IN THE MORNING (ALONG WITH 60MG)      DULoxetine (CYMBALTA) 60 mg delayed release capsule       gabapentin (NEURONTIN) 300 mg capsule Take 300 mg by mouth 2 (two) times a day      gabapentin (NEURONTIN) 400 mg capsule Take 400 mg by mouth 2 (two) times a day      levothyroxine 88 mcg tablet Take 88 mcg by mouth daily      Albuterol Sulfate (VENTOLIN HFA IN) Ventolin HFA 90 mcg/actuation aerosol inhaler      amLODIPine (NORVASC) 10 mg tablet Take 1 tablet (10 mg total) by mouth daily 21 tablet 0    atorvastatin (LIPITOR) 40 mg tablet TAKE 1 TABLET BY MOUTH EVERY DAY AT NIGHT      BD PEN NEEDLE ANDREA U/F 32G X 4 MM MISC ONCE DAILY  4    benzonatate (TESSALON) 200 MG capsule TAKE 1 CAPSULE (200 MG TOTAL) BY MOUTH 3 (THREE) TIMES A DAY AS NEEDED FOR COUGH.      clarithromycin (BIAXIN) 500 mg tablet TAKE 1 TABLET BY MOUTH TWICE A DAY FOR 7 DAYS      Diethylpropion HCl 25 MG TABS 1 PO TID 1 hour AC 90 tablet 1    doxycycline (ADOXA) 100 MG tablet Take 1 tablet by mouth 2 (two) times a day      esomeprazole (NexIUM) 40 MG capsule Take 1 capsule (40 mg total) by mouth 2 (two) times a day before meals 180 capsule 0    famotidine (PEPCID) 40 MG tablet Take 1 tablet (40 mg total) by mouth daily at bedtime At lunch and before bedtime as needed 180 tablet 1    fenofibrate (TRICOR) 145 mg tablet       fluticasone (Flovent HFA) 110 MCG/ACT inhaler Inhale 2 puffs 2 (two) times a day      Hydrocod Ermias-Chlorphe Ermias ER (TUSSIONEX) 10-8 mg/5 mL ER suspension Take 5 mL by mouth every 12 (twelve) hours as needed      Insulin Pen Needle 32G X 6 MM  "MISC BD Ultra-Fine Sherrill Pen Needle 32 gauge x 5/32\"      levofloxacin (LEVAQUIN) 750 mg tablet TAKE 1 TABLET (750 MG TOTAL) BY MOUTH DAILY FOR 14 DAYS.      Lidocaine Viscous HCl (XYLOCAINE) 2 % mucosal solution Swish and spit 15 mL 4 (four) times a day as needed for mouth pain or discomfort 100 mL 0    LORazepam (ATIVAN) 0.5 mg tablet Take 1 tablet by mouth 2 (two) times a day as needed      losartan (COZAAR) 25 mg tablet TAKE 1 TABLET (25 MG TOTAL) BY MOUTH DAILY.      meclizine (ANTIVERT) 25 mg tablet TAKE 1 TABLET BY MOUTH THREE TIMES A DAY AS NEEDED FOR DIZZINESS  1    metFORMIN (GLUCOPHAGE-XR) 500 mg 24 hr tablet TAKE 1 TABLET BY MOUTH TWICE A DAY WITH MEALS      methylphenidate (RITALIN) 20 MG tablet TAKE 1 TABLET BY MOUTH TWICE A DAY FOR ONGOING THERAPY      metoclopramide (Reglan) 10 mg tablet Take 1 tablet (10 mg total) by mouth 3 (three) times a day as needed (nausea, headache) 15 tablet 0    mirtazapine (REMERON) 7.5 MG tablet Take 1 tablet (7.5 mg total) by mouth daily at bedtime for 30 days 30 tablet 0    mometasone (Asmanex, 120 Metered Doses,) 220 mcg/actuation inhaler       predniSONE 10 mg tablet TAKE 4 TABLETS FOR 3 DAYS THEN TAKE 3 TABLETS FOR 3 DAYS THEN TAKE 2 TABLETS FOR 3 DAYS THEN TAKE 1 TABLET FOR 3 DAYS      promethazine-dextromethorphan (PHENERGAN-DM) 6.25-15 mg/5 mL oral syrup TAKE 5 MILLILITERS BY MOUTH 4 TIMES A DAY AS NEEDED FOR COUGH      rimegepant sulfate (Nurtec) 75 mg TBDP       semaglutide, 0.25 or 0.5 mg/dose, (Ozempic, 0.25 or 0.5 MG/DOSE,) 2 mg/3 mL injection pen INJECT 0.5MG UNDER THE SKIN EVERY 7 DAYS      sucralfate (CARAFATE) 1 g tablet TAKE 1 TABLET (1 G TOTAL) BY MOUTH 3 (THREE) TIMES A DAY FOR 14 DAYS 42 tablet 0    topiramate (TOPAMAX) 50 MG tablet Take 1 tablet by mouth 2 (two) times a day      Trintellix 20 MG tablet Take 20 mg by mouth every morning      Vortioxetine HBr (Trintellix) 5 MG tablet Take 1 tablet by mouth every morning       No current " facility-administered medications for this visit.     Past Medical History:   Diagnosis Date    Acid reflux     Anxiety     Asthma     Depression     Fibromyalgia     Fibromyalgia     Headache     Hyperlipidemia     Hypothyroidism     IBS (irritable bowel syndrome)     Infertility, female     Insulin resistance     Obesity (BMI 30-39.9)     Occipital neuralgia     Papilledema     PCOS (polycystic ovarian syndrome)     Pseudotumor cerebri     Tinnitus     Tremor      Past Surgical History:   Procedure Laterality Date    ANKLE FRACTURE SURGERY Right     hardware     SECTION      CHOLECYSTECTOMY      DILATION AND CURETTAGE OF UTERUS      ENDOMETRIAL BIOPSY      FL LUMBAR PUNCTURE DIAGNOSTIC  2018    HYSTERECTOMY      TONSILLECTOMY AND ADENOIDECTOMY      UMBILICAL HERNIA REPAIR       Social History     Socioeconomic History    Marital status: /Civil Union     Spouse name: Not on file    Number of children: Not on file    Years of education: Not on file    Highest education level: Not on file   Occupational History    Not on file   Tobacco Use    Smoking status: Never    Smokeless tobacco: Never   Vaping Use    Vaping status: Never Used   Substance and Sexual Activity    Alcohol use: Yes     Alcohol/week: 1.0 standard drink of alcohol     Types: 1 Standard drinks or equivalent per week     Comment: social    Drug use: No    Sexual activity: Not on file   Other Topics Concern    Not on file   Social History Narrative    Not on file     Social Determinants of Health     Financial Resource Strain: Not on file   Food Insecurity: Not on file   Transportation Needs: Not on file   Physical Activity: Not on file   Stress: Not on file (2021)   Social Connections: Unknown (2024)    Received from [facility]    Social Connections     How often do you feel lonely or isolated from those around you? (Adult - for ages 18 years and over): Not on file   Intimate Partner Violence: Low Risk  (2021)     Received from [facility] [facility] [place]    Intimate Partner Violence     Insults You: Not on file     Threatens You: Not on file     Screams at You: Not on file     Physically Hurt: Not on file     Intimate Partner Violence Score: Not on file   Housing Stability: Not on file     Family History   Problem Relation Age of Onset    Hypertension Mother     Hypothyroidism Mother     Hypertension Father     Hypothyroidism Father     Insulin resistance Father     Hypothyroidism Sister             PHYSICAL EXAM:    There were no vitals filed for this visit.  General Appearance:   Alert and oriented x 3. Cooperative, and in no respiratory distress   HEENT:   Normocephalic, atraumatic, anicteric.     Neck:  Supple, symmetrical, trachea midline   Lungs:   Clear to auscultation bilaterally    Heart::   Regular rate and rhythm   Abdomen:   Soft, non-tender, non-distended; normal bowel sounds; no masses, no organomegaly    Genitalia:   Deferred    Rectal:   Deferred    Extremities:  No cyanosis, clubbing or edema    Pulses:  2+ and symmetric all extremities    Skin:  Skin color, texture, turgor normal, no rashes or lesions    Lymph nodes:  No palpable cervical or supraclavicular lymphadenopathy        Lab Results:   Results from last 6 Months   Lab Units 05/16/24  1836   WBC Thousand/uL 9.85   HEMOGLOBIN g/dL 12.8   HEMATOCRIT % 39.9   PLATELETS Thousands/uL 343   SEGS PCT % 75   LYMPHO PCT % 17   MONO PCT % 6   EOS PCT % 1     Results from last 6 Months   Lab Units 09/10/24  1009   POTASSIUM mmol/L 4.5   CHLORIDE mmol/L 107   CO2 mmol/L 23   BUN mg/dL 10   CREATININE mg/dL 0.84   CALCIUM mg/dL 9.9   ALK PHOS U/L 55   ALT U/L 18   AST U/L 19               Imaging Studies:   CTA head and neck with and without contrast    Result Date: 5/16/2024  Impression: No acute intracranial abnormality. No evidence of arterial vessel injury. No focal stenosis or aneurysm within the [place]. No hemodynamically significant stenosis  within either common or internal carotid artery. Less than 50% stenosis by NASCET criteria Workstation performed: [id number]     CT spine cervical without contrast    Result Date: 5/16/2024  Impression: No cervical spine fracture or traumatic malalignment. Workstation performed: [id number]       ASSESSMENT and PLAN:      1) Ulcerative dyspepsia, gastritis and dysphagia - Last EGD in January 2024 revealing erosive gastritis.  She had a barium swallow during the time she had COVID revealing laryngeal penetration.  Reports that she follows with [facility].  Unfortunately, her gastritis may have been flared up again by taking prednisone long-term and with NSAID usage for fibromyalgia.  - Plan for gastric emptying study, she has been off of Ozempic for at least 3 weeks now  - When possible, refrain from using NSAIDs.  Patient reports she is having a hard time with managing her fibromyalgia.   - Nexium 40 mg twice daily, 30 to 60 minutes before breakfast and dinner  - Pepcid 40 mg twice daily, at lunch and at bedtime  - Carafate course  - Written instructions provided to the patient  - Patient is already on other medication for fibromyalgia like Cymbalta and gabapentin that would likely interfere with a TCA  - Weight loss  - She would like to defer a EGD with manometry for now  - Patient and  agree with plan    2) Chest pain - Patient had similar symptoms after her last gastritis episode.  However, this time she reports shortness of breath.  Last year this was accompanied by COVID.  She has other family members, mostly men, who have history of CAD in the family.  She reports that she did discuss this with her PCP who did not feel a stress test was necessary.  I offered her a consultation with cardiology.  ED parameters discussed.    3) Colon cancer screening - Will need to be readdressed during her next follow-up once her upper GI symptoms are improved.      Follow up after above workup.      Portions  "of the record may have been created with voice recognition software.  Occasional wrong word or \"sound a like\" substitutions may have occurred due to the inherent limitations of voice recognition software.  Read the chart carefully and recognize, using context, where substitutions have occurred.  "

## 2024-10-31 ENCOUNTER — TELEPHONE (OUTPATIENT)
Dept: GASTROENTEROLOGY | Facility: CLINIC | Age: 44
End: 2024-10-31

## 2024-10-31 NOTE — TELEPHONE ENCOUNTER
Left message for patient to return call if she is available to see Jennie on 11/1, opening in the afternoon

## 2024-12-10 ENCOUNTER — TELEPHONE (OUTPATIENT)
Dept: NEUROLOGY | Facility: CLINIC | Age: 44
End: 2024-12-10

## 2024-12-10 NOTE — TELEPHONE ENCOUNTER
Pt and Pt's spouse both didn't answer the phone. Also, neither of them have a MONTA[facility] system set up, so I sent a mail reminder.

## 2024-12-17 ENCOUNTER — TELEPHONE (OUTPATIENT)
Dept: GASTROENTEROLOGY | Facility: CLINIC | Age: 44
End: 2024-12-17

## 2024-12-18 ENCOUNTER — OFFICE VISIT (OUTPATIENT)
Dept: NEUROLOGY | Facility: CLINIC | Age: 44
End: 2024-12-18
Payer: COMMERCIAL

## 2024-12-18 VITALS
BODY MASS INDEX: 38.51 KG/M2 | HEIGHT: 61 IN | OXYGEN SATURATION: 99 % | HEART RATE: 107 BPM | WEIGHT: 204 LBS | SYSTOLIC BLOOD PRESSURE: 124 MMHG | DIASTOLIC BLOOD PRESSURE: 80 MMHG | TEMPERATURE: 97.5 F

## 2024-12-18 DIAGNOSIS — G93.2 PSEUDOTUMOR CEREBRI: Primary | ICD-10-CM

## 2024-12-18 DIAGNOSIS — G43.711 CHRONIC MIGRAINE WITHOUT AURA, WITH INTRACTABLE MIGRAINE, SO STATED, WITH STATUS MIGRAINOSUS: ICD-10-CM

## 2024-12-18 PROCEDURE — 99205 OFFICE O/P NEW HI 60 MIN: CPT | Performed by: PSYCHIATRY & NEUROLOGY

## 2024-12-18 RX ORDER — MONTELUKAST SODIUM 10 MG/1
1 TABLET ORAL DAILY
COMMUNITY
Start: 2024-10-07

## 2024-12-18 RX ORDER — FLUCONAZOLE 150 MG/1
TABLET ORAL
COMMUNITY
Start: 2024-11-06

## 2024-12-18 RX ORDER — CLINDAMYCIN HYDROCHLORIDE 150 MG/1
CAPSULE ORAL
COMMUNITY
Start: 2024-10-14

## 2024-12-18 RX ORDER — PROMETHAZINE HYDROCHLORIDE AND CODEINE PHOSPHATE 6.25; 1 MG/5ML; MG/5ML
SOLUTION ORAL
COMMUNITY
Start: 2024-12-04

## 2024-12-18 RX ORDER — IPRATROPIUM BROMIDE AND ALBUTEROL SULFATE 2.5; .5 MG/3ML; MG/3ML
3 SOLUTION RESPIRATORY (INHALATION) 4 TIMES DAILY
COMMUNITY
Start: 2024-12-11

## 2024-12-18 RX ORDER — DOXYCYCLINE HYCLATE 100 MG
1 TABLET ORAL 2 TIMES DAILY
COMMUNITY
Start: 2024-11-06

## 2024-12-18 RX ORDER — TOPIRAMATE 100 MG/1
100 TABLET, FILM COATED ORAL
Qty: 30 TABLET | Refills: 6 | Status: SHIPPED | OUTPATIENT
Start: 2024-12-18

## 2024-12-18 RX ORDER — FLUCONAZOLE 200 MG/1
200 TABLET ORAL DAILY
COMMUNITY
Start: 2024-12-04

## 2024-12-18 NOTE — PROGRESS NOTES
"Name: Kaleigh Hernandez      : 1980      MRN: [id number]  Encounter Provider: Dionne Pemberton MD  Encounter Date: 2024   Encounter department: [facility]  :  Assessment & Plan  Pseudotumor cerebri  Patient is a 44-year-old female with history of hypertension, obesity, depression/anxiety, fibromyalgia, hypothyroidism, IBS, muscle tension dysphonia and type 2 diabetes who presents for evaluation of headaches.     Patient today reports having a history of headaches.  They report having a retro-orbital/bitemporal pressure as well as a separate kind of headache described as a heaviness throughout the head. Associated symptoms includes nausea, neck pain blurry vision, tinnitus and rare photophobia. She describes having significant difficulty with vision (described as if she were looking through a dirty window).  They report that headaches are triggered by stress, which also worsens them . In a month they can get 12-16 headache days. Risk factors include obesity, stress, snoring , and sleep disturbances . Patient reports that her headaches completely resolved with acetazolamide however she does not take it as she believes allows her to \"take her teeth out\". Physical exam was normal.    Impression: At this time, based on history, available workup and physical exam,  I believe patients headaches are likely due to IIH. In 2018 her opening pressure was 27. I discussed with patient the importance of keeping the pressure down, the mechanism of carbonic anhydrase inhibitors and why we do lumbar puncture. She had a bad experience in the past with her LP as she required a blood patch. She would not like to repeat this. She has been taking topamax 50 mg for a long time and is not sure if this is helping her. Ultimately, I believe that weight loss my benefit her greatly.     Plan:  Recommend PCP consider WeGovy as she anecdotally has had more success with this in the past   Advised " patient to follow up with ophthalmology   Given that patient has 12-16 headache days in a month will recommend the following:  For preventative purposes:  Will increase topamax to 100 mg qhs  Recommend magnesium oxide 400 mg and riboflavin 400 mg   If topamax doesn't help, will consider adding Lasic  For headache  recommend Ubrelvy 100 mg   Discussed medication side effects and advised to call our office if they are unable to tolerate these  Patient advised not to take over-the-counter or prescription pain medications more than 3 days per week to prevent medication overuse/rebound headache  Lifestyle recommendations:  Recommend drinking at least 64 oz of water daily   Recommend maintaining a regular sleep schedule 84 7 to 9 hours of quality sleep per night  Recommend avoiding stress  Recommend engaging in regular physical activity  Recommend following a healthy diet and identifying foods second, may trigger headaches such as alcohol, caffeine, chocolate, H cheeses, processed foods and artificial sweeteners  Recommend maintaining a good posture  Advised to keep track of headache patterns including triggers, alleviating/exacerbating factors, duration and response to medication  Follow-up in 3 months    Orders:  •  topiramate (TOPAMAX) 100 mg tablet; Take 1 tablet (100 mg total) by mouth daily at bedtime    Chronic migraine without aura, with intractable migraine, so stated, with status migrainosus    Orders:  •  topiramate (TOPAMAX) 100 mg tablet; Take 1 tablet (100 mg total) by mouth daily at bedtime  •  Ubrogepant (UBRELVY) 100 MG tablet; Take 1 tablet (100 mg) one time as needed for migraine. May repeat one additional tablet (100 mg) at least two hours after the first dose. Do not use more than two doses per day, or for more than eight days per month.        History of Present Illness   HPI    History of Present Illness:     Patient is a 44-year-old female with history of hypertension, obesity,  "depression/anxiety, fibromyalgia, hypothyroidism, IBS, muscle tension dysphonia and type 2 diabetes who presents for evaluation of headaches. She used to be followed by Dr. Nuñez.     Timing and severity:  History of headaches or are these new onset? History of headaches   How many headache days do they have in a month?  - as of 12/21/2024: 12-16  What time of the day do the headaches start?  The mornings are worse.   How long do the headaches last? Constant sx  Are you ever headache free? Yes  Any changes in medication, medical history or major life stressors associated with headache onset? When she is at school she notices it more.     Characteristics, aggravating and alleviating factors:  Location and description? Retro orbital, bitemporal pressure, a second kind of headache where he \"neck is a toothpick and her head is a bowling ball\". Pressure throughout the whole head. Can get rare sharp headaches lasting headaches.   Headache triggers:  stress  What makes the headache worse? Work     Associated symptoms:     Nausea, Neck pain, Blurry vision, and Tinnitus, rare photophobia   Has major eye issues (Like looking through a dirty window).     Workup:  Have you seen someone else for headaches or pain? Yes, Used to follow with Dr. Nuñez   Have you ever had any Brain imaging? yes  MRI brain 2018: Prominence of the extra-axial CSF spaces. Unremarkable optic apparatus with no mass or abnormal enhancement. No acute infarction. No acute intracranial hemorrhage.   LP in 2018. Opening pressure 27  Last eye exam: [place]. Sees her every year     Prior treatment:     Abortive:   Nurtec- reduced intensity but didn't help HA completely resolve   Ibuprofen- cannot take due to gastritis.    Preventative:  Acetazolamide- helped but couldn't take but she states she can \"take her teeth out\".   Neurontin 400 mg bid   Topamax 50 mg bid- has been taking it so long that she does not know what would happen if she came off it "   Metoclopramide    Other:  Prednisone- on this for bronchitis,   Ozempic - lost some weight initially and then plateau   Methylphenidate  Mirtazapine 7.5 mg     Sleep :  averages: 4-5 hours  Problems falling asleep?:   Yes  Problems staying asleep?:  Yes  Do you snore while asleep?Yes, when she doesn't feel good,   Do you wake up with headaches? Yes  no tonsils or adenoids     Social:  Water:  Caffeine: Does not because of gastritis   Mood: Depression/anxiety   ETOH: No  Tobacco:  No  Illicit drugs:  No  Work:   Weight changes: difficulty losing weight     Review of Systems   Review of Systems   Constitutional:  Negative for appetite change, fatigue and fever.   HENT: Negative.  Negative for hearing loss, tinnitus, trouble swallowing and voice change.    Eyes: Negative.  Negative for photophobia, pain and visual disturbance.   Respiratory: Negative.  Negative for shortness of breath.    Cardiovascular: Negative.  Negative for palpitations.   Gastrointestinal: Negative.  Negative for nausea and vomiting.   Endocrine: Negative.  Negative for cold intolerance.   Genitourinary: Negative.  Negative for dysuria, frequency and urgency.   Musculoskeletal:  Negative for back pain, gait problem, myalgias, neck pain and neck stiffness.   Skin: Negative.  Negative for rash.   Allergic/Immunologic: Negative.    Neurological:  Positive for headaches. Negative for dizziness, tremors, seizures, syncope, facial asymmetry, speech difficulty, weakness, light-headedness and numbness.        Pt states she has occipital pain, also has horizontal numbness/tingling under right eye. Cannot use heat, only ice. Pt states she has knots in her scapula area. More right than left. Have problems on her right side overall.   Hematological: Negative.  Does not bruise/bleed easily.   Psychiatric/Behavioral: Negative.  Negative for confusion, hallucinations and sleep disturbance.    All other systems reviewed and are negative.    I  "have personally reviewed the MA's review of systems and made changes as necessary.         Objective   /80 (BP Location: Right arm, Patient Position: Sitting, Cuff Size: Standard)   Pulse (!) 107   Temp 97.5 °F (36.4 °C) (Temporal)   Ht 5' 1\" (1.549 m)   Wt 92.5 kg (204 lb)   SpO2 99%   BMI 38.55 kg/m²     Physical Exam  Constitutional:       General: She is awake.   Neurological:      Mental Status: She is alert.      Motor: Motor strength is normal.     Deep Tendon Reflexes:      Reflex Scores:       Bicep reflexes are 2+ on the right side and 2+ on the left side.       Brachioradialis reflexes are 2+ on the right side and 2+ on the left side.       Patellar reflexes are 2+ on the right side and 2+ on the left side.  Psychiatric:         Speech: Speech normal.       Neurological Exam  Mental Status  Awake and alert. Speech is normal. Language is fluent with no aphasia. Attention and concentration are normal.    Motor   Normal muscle tone. No abnormal involuntary movements. Strength is 5/5 throughout all four extremities.    Reflexes                                            Right                      Left  Brachioradialis                    2+                         2+  Biceps                                 2+                         2+  Patellar                                2+                         2+    Coordination    Normal coordination .    Gait    Normal gait .            "

## 2024-12-18 NOTE — PROGRESS NOTES
Name: Kaleigh Hernandez      : 1980      MRN: [id number]  Encounter Provider: Dionne Pemberton MD  Encounter Date: 2024   Encounter department: [facility] [place]  :  Assessment & Plan  Pseudotumor cerebri    Orders:  •  topiramate (TOPAMAX) 100 mg tablet; Take 1 tablet (100 mg total) by mouth daily at bedtime    Chronic migraine without aura, with intractable migraine, so stated, with status migrainosus    Orders:  •  topiramate (TOPAMAX) 100 mg tablet; Take 1 tablet (100 mg total) by mouth daily at bedtime  •  Ubrogepant (UBRELVY) 100 MG tablet; Take 1 tablet (100 mg) one time as needed for migraine. May repeat one additional tablet (100 mg) at least two hours after the first dose. Do not use more than two doses per day, or for more than eight days per month.      {Ambulatory Patient Instructions (Optional):00900}    History of Present Illness {?Quick Links Encounters * My Last Note * Last Note in Specialty * Snapshot * Since Last Visit * History :84687}  HPI  Review of Systems   Constitutional:  Negative for appetite change, fatigue and fever.   HENT: Negative.  Negative for hearing loss, tinnitus, trouble swallowing and voice change.    Eyes: Negative.  Negative for photophobia, pain and visual disturbance.   Respiratory: Negative.  Negative for shortness of breath.    Cardiovascular: Negative.  Negative for palpitations.   Gastrointestinal: Negative.  Negative for nausea and vomiting.   Endocrine: Negative.  Negative for cold intolerance.   Genitourinary: Negative.  Negative for dysuria, frequency and urgency.   Musculoskeletal:  Negative for back pain, gait problem, myalgias, neck pain and neck stiffness.   Skin: Negative.  Negative for rash.   Allergic/Immunologic: Negative.    Neurological:  Positive for headaches. Negative for dizziness, tremors, seizures, syncope, facial asymmetry, speech difficulty, weakness, light-headedness and numbness.        Pt states she has  "occipital pain, also has horizontal numbness/tingling under right eye. Cannot use heat, only ice. Pt states she has knots in her scapula area. More right than left. Have problems on her right side overall.   Hematological: Negative.  Does not bruise/bleed easily.   Psychiatric/Behavioral: Negative.  Negative for confusion, hallucinations and sleep disturbance.    All other systems reviewed and are negative.   I have personally reviewed the MA's review of systems and made changes as necessary.    {Select to insert medical history sections (Optional):10854}     Objective {?Quick Links Trend Vitals * Enter New Vitals * Results Review * Timeline (Adult) * Labs * Imaging * Cardiology * Procedures * Lung Cancer Screening * Surgical eConsent :57334}  /80 (BP Location: Right arm, Patient Position: Sitting, Cuff Size: Standard)   Pulse (!) 107   Temp 97.5 °F (36.4 °C) (Temporal)   Ht 5' 1\" (1.549 m)   Wt 92.5 kg (204 lb)   SpO2 99%   BMI 38.55 kg/m²     Physical Exam  Neurological Exam    {Radiology Results Review (Optional):03343}    {Administrative / Billing Section (Optional):53457}  "

## 2024-12-18 NOTE — ASSESSMENT & PLAN NOTE
"Patient is a 44-year-old female with history of hypertension, obesity, depression/anxiety, fibromyalgia, hypothyroidism, IBS, muscle tension dysphonia and type 2 diabetes who presents for evaluation of headaches.     Patient today reports having a history of headaches.  They report having a retro-orbital/bitemporal pressure as well as a separate kind of headache described as a heaviness throughout the head. Associated symptoms includes nausea, neck pain blurry vision, tinnitus and rare photophobia. She describes having significant difficulty with vision (described as if she were looking through a dirty window).  They report that headaches are triggered by stress, which also worsens them . In a month they can get 12-16 headache days. Risk factors include obesity, stress, snoring , and sleep disturbances . Patient reports that her headaches completely resolved with acetazolamide however she does not take it as she believes allows her to \"take her teeth out\". Physical exam was normal.    Impression: At this time, based on history, available workup and physical exam,  I believe patients headaches are likely due to IIH. In 2018 her opening pressure was 27. I discussed with patient the importance of keeping the pressure down, the mechanism of carbonic anhydrase inhibitors and why we do lumbar puncture. She had a bad experience in the past with her LP as she required a blood patch. She would not like to repeat this. She has been taking topamax 50 mg for a long time and is not sure if this is helping her. Ultimately, I believe that weight loss my benefit her greatly.     Plan:  Recommend PCP consider Georgina as she anecdotally has had more success with this in the past   Advised patient to follow up with ophthalmology   Given that patient has 12-16 headache days in a month will recommend the following:  For preventative purposes:  Will increase topamax to 100 mg qhs  Recommend magnesium oxide 400 mg and riboflavin 400 mg "   If topamax doesn't help, will consider adding Lasic  For headache  recommend Ubrelvy 100 mg   Discussed medication side effects and advised to call our office if they are unable to tolerate these  Patient advised not to take over-the-counter or prescription pain medications more than 3 days per week to prevent medication overuse/rebound headache  Lifestyle recommendations:  Recommend drinking at least 64 oz of water daily   Recommend maintaining a regular sleep schedule 84 7 to 9 hours of quality sleep per night  Recommend avoiding stress  Recommend engaging in regular physical activity  Recommend following a healthy diet and identifying foods second, may trigger headaches such as alcohol, caffeine, chocolate, H cheeses, processed foods and artificial sweeteners  Recommend maintaining a good posture  Advised to keep track of headache patterns including triggers, alleviating/exacerbating factors, duration and response to medication  Follow-up in 3 months    Orders:  •  topiramate (TOPAMAX) 100 mg tablet; Take 1 tablet (100 mg total) by mouth daily at bedtime

## 2024-12-18 NOTE — PATIENT INSTRUCTIONS
Increase topamax to 100 mg at bedtime   Consider taking Magnesium oxide 400 mg and riboflavin 400 mg daily   Switch Nurtec to Ubrelvy- take at HA onset   If topamax monotherapy doesn't help, will consider adding lasix   Talk to PCP about starting WeGovy

## 2024-12-21 NOTE — ASSESSMENT & PLAN NOTE
Orders:  •  topiramate (TOPAMAX) 100 mg tablet; Take 1 tablet (100 mg total) by mouth daily at bedtime

## 2025-01-05 ENCOUNTER — OFFICE VISIT (OUTPATIENT)
Dept: URGENT CARE | Facility: CLINIC | Age: 45
End: 2025-01-05
Payer: COMMERCIAL

## 2025-01-05 VITALS
HEART RATE: 88 BPM | TEMPERATURE: 98.6 F | BODY MASS INDEX: 37.79 KG/M2 | OXYGEN SATURATION: 99 % | DIASTOLIC BLOOD PRESSURE: 90 MMHG | WEIGHT: 200 LBS | SYSTOLIC BLOOD PRESSURE: 136 MMHG

## 2025-01-05 DIAGNOSIS — J06.9 ACUTE URI: ICD-10-CM

## 2025-01-05 DIAGNOSIS — H60.313 ACUTE DIFFUSE OTITIS EXTERNA OF BOTH EARS: ICD-10-CM

## 2025-01-05 DIAGNOSIS — R05.3 CHRONIC COUGH: ICD-10-CM

## 2025-01-05 DIAGNOSIS — J45.41 MODERATE PERSISTENT ASTHMA WITH (ACUTE) EXACERBATION: Primary | ICD-10-CM

## 2025-01-05 PROBLEM — I83.93 VARICOSE VEINS OF BOTH LOWER EXTREMITIES: Status: ACTIVE | Noted: 2018-09-10

## 2025-01-05 PROCEDURE — G0382 LEV 3 HOSP TYPE B ED VISIT: HCPCS | Performed by: NURSE PRACTITIONER

## 2025-01-05 PROCEDURE — S9083 URGENT CARE CENTER GLOBAL: HCPCS | Performed by: NURSE PRACTITIONER

## 2025-01-05 RX ORDER — SEMAGLUTIDE 0.5 MG/.5ML
INJECTION, SOLUTION SUBCUTANEOUS
COMMUNITY
Start: 2024-12-23

## 2025-01-05 RX ORDER — AZITHROMYCIN 250 MG/1
TABLET, FILM COATED ORAL
Qty: 6 TABLET | Refills: 0 | Status: SHIPPED | OUTPATIENT
Start: 2025-01-05 | End: 2025-01-09

## 2025-01-05 RX ORDER — BUDESONIDE 0.5 MG/2ML
0.5 INHALANT ORAL 2 TIMES DAILY
Qty: 120 ML | Refills: 0 | Status: SHIPPED | OUTPATIENT
Start: 2025-01-05

## 2025-01-05 RX ORDER — OFLOXACIN 3 MG/ML
5 SOLUTION AURICULAR (OTIC) 2 TIMES DAILY
Qty: 2.5 ML | Refills: 0 | Status: SHIPPED | OUTPATIENT
Start: 2025-01-05 | End: 2025-01-10

## 2025-01-05 NOTE — LETTER
January 5, 2025     Patient: Kaleigh Hernandez   YOB: 1980   Date of Visit: 1/5/2025       To Whom it May Concern    Kaleigh Hernandez was seen in my clinic on 1/5/2025. She may return to work on 01/09/2025 .    If you have any questions or concerns, please don't hesitate to call.         Sincerely,          JOCELINE Butt        CC: No Recipients

## 2025-01-05 NOTE — PROGRESS NOTES
[facility]        NAME: Kaleigh Hernandez is a 44 y.o. female  : 1980    MRN: [id number]  DATE: 2025  TIME: 1:42 PM    Assessment and Plan   Moderate persistent asthma with (acute) exacerbation [J45.41]  1. Moderate persistent asthma with (acute) exacerbation        2. Acute URI  azithromycin (ZITHROMAX) 250 mg tablet    ofloxacin (FLOXIN) 0.3 % otic solution    budesonide (PULMICORT) 0.5 mg/2 mL nebulizer solution      3. Chronic cough        4. Acute diffuse otitis externa of both ears          Patient with chronic coughing fits and tightness since bronchitis in October. Advised to use budesonide in nebulizer with her duonebs up to 4 times daily. Has not responded to oral steroids in the past few months. Will treat with zpack and drops for otitis externa. Continue current allergy medications, advised on saline sinus rinse. Has otc and prescription cough medications which have not helped to date.     Patient Instructions       Follow up with PCP in 3-5 days.  Proceed to  ER if symptoms worsen.    If tests are performed, our office will contact you with results only if changes need to made to the care plan discussed with you at the visit. [facility]    Chief Complaint     Chief Complaint   Patient presents with    Cough     Cough, broch/ pneumonia since  But now cough is producing a lot of Mucus           History of Present Illness       Pneumonia/bronchitis in October and was treated with steroids, abx, cough medications without relief. Now having more mucous. States nothing has helped to date with her symptoms.     Cough  This is a recurrent problem. The current episode started more than 1 month ago. The problem has been waxing and waning. The cough is Productive of sputum. Associated symptoms include ear congestion, ear pain, headaches, shortness of breath and wheezing. Pertinent negatives include no chest pain, chills, fever,  heartburn, hemoptysis, myalgias, nasal congestion, postnasal drip, rash, rhinorrhea, sore throat, sweats or weight loss. The symptoms are aggravated by cold air. She has tried oral steroids, OTC cough suppressant, prescription cough suppressant, ipratropium inhaler and leukotriene antagonists for the symptoms. The treatment provided no relief. Her past medical history is significant for asthma.       Review of Systems   Review of Systems   Constitutional:  Negative for chills, fever and weight loss.   HENT:  Positive for ear pain. Negative for postnasal drip, rhinorrhea and sore throat.    Respiratory:  Positive for cough, choking, chest tightness, shortness of breath and wheezing. Negative for hemoptysis.    Cardiovascular:  Negative for chest pain.   Gastrointestinal:  Negative for heartburn.   Musculoskeletal:  Negative for myalgias.   Skin:  Negative for rash.   Neurological:  Positive for headaches.         Current Medications       Current Outpatient Medications:     Albuterol Sulfate (VENTOLIN HFA IN), Ventolin HFA 90 mcg/actuation aerosol inhaler, Disp: , Rfl:     amLODIPine (NORVASC) 10 mg tablet, Take 1 tablet (10 mg total) by mouth daily, Disp: 21 tablet, Rfl: 0    azithromycin (ZITHROMAX) 250 mg tablet, Take 2 tablets today then 1 tablet daily x 4 days, Disp: 6 tablet, Rfl: 0    benzonatate (TESSALON) 200 MG capsule, TAKE 1 CAPSULE (200 MG TOTAL) BY MOUTH 3 (THREE) TIMES A DAY AS NEEDED FOR COUGH., Disp: , Rfl:     budesonide (PULMICORT) 0.5 mg/2 mL nebulizer solution, Take 2 mL (0.5 mg total) by nebulization 2 (two) times a day Rinse mouth after use., Disp: 120 mL, Rfl: 0    busPIRone (BUSPAR) 10 mg tablet, Take 10 mg by mouth 2 (two) times a day, Disp: , Rfl:     famotidine (PEPCID) 40 MG tablet, Take 1 tablet (40 mg total) by mouth daily at bedtime At lunch and before bedtime as needed, Disp: 180 tablet, Rfl: 1    fenofibrate (TRICOR) 145 mg tablet, , Disp: , Rfl:     gabapentin (NEURONTIN) 400 mg  capsule, Take 400 mg by mouth 2 (two) times a day, Disp: , Rfl:     levothyroxine 88 mcg tablet, Take 88 mcg by mouth daily, Disp: , Rfl:     montelukast (SINGULAIR) 10 mg tablet, Take 1 tablet by mouth in the morning, Disp: , Rfl:     ofloxacin (FLOXIN) 0.3 % otic solution, Administer 5 drops into both ears 2 (two) times a day for 5 days, Disp: 2.5 mL, Rfl: 0    Promethazine-Codeine 6.25-10 MG/5ML SOLN, TAKE 5 ML BY MOUTH EVERY 4 (FOUR) HOURS AS NEEDED FOR COUGH., Disp: , Rfl:     rimegepant sulfate (Nurtec) 75 mg TBDP, , Disp: , Rfl:     sucralfate (CARAFATE) 1 g/10 mL suspension, Take 10 mL (1 g total) by mouth 4 (four) times a day (with meals and at bedtime), Disp: 473 mL, Rfl: 2    Wegovy 0.5 MG/0.5ML, INJECT 0.5MG UNDER THE SKIN EVERY 7 DAYS, Disp: , Rfl:     amoxicillin-clavulanate (AUGMENTIN) 875-125 mg per tablet, TAKE 1 TABLET BY MOUTH EVERY 12 (TWELVE) HOURS FOR 10 DAYS. (Patient not taking: Reported on 12/18/2024), Disp: , Rfl:     atorvastatin (LIPITOR) 40 mg tablet, TAKE 1 TABLET BY MOUTH EVERY DAY AT NIGHT (Patient not taking: Reported on 1/5/2025), Disp: , Rfl:     BD PEN NEEDLE ANDREA U/F 32G X 4 MM MISC, ONCE DAILY (Patient not taking: Reported on 1/5/2025), Disp: , Rfl: 4    clarithromycin (BIAXIN) 500 mg tablet, TAKE 1 TABLET BY MOUTH TWICE A DAY FOR 7 DAYS (Patient not taking: Reported on 12/18/2024), Disp: , Rfl:     clindamycin (CLEOCIN) 150 mg capsule, TAKE 2 CAPSULES BY MOUTH NOW, THEN TAKE 1 CAPSULE BY MOUTH EVERY 6 HOURS FOR 7 DAYS (Patient not taking: Reported on 12/18/2024), Disp: , Rfl:     Diethylpropion HCl 25 MG TABS, 1 PO TID 1 hour AC (Patient not taking: Reported on 12/18/2024), Disp: 90 tablet, Rfl: 1    doxycycline (ADOXA) 100 MG tablet, Take 1 tablet by mouth 2 (two) times a day (Patient not taking: Reported on 12/18/2024), Disp: , Rfl:     doxycycline hyclate (VIBRA-TABS) 100 mg tablet, Take 1 tablet by mouth 2 (two) times a day (Patient not taking: Reported on 1/5/2025), Disp:  ", Rfl:     DULoxetine (CYMBALTA) 30 mg delayed release capsule, 1 CAP BY MOUTH IN THE MORNING (ALONG WITH 60MG) (Patient not taking: Reported on 12/18/2024), Disp: , Rfl:     DULoxetine (CYMBALTA) 60 mg delayed release capsule, , Disp: , Rfl:     esomeprazole (NexIUM) 40 MG capsule, Take 1 capsule (40 mg total) by mouth 2 (two) times a day before meals, Disp: 180 capsule, Rfl: 2    fluconazole (DIFLUCAN) 150 mg tablet, TAKE 1 TABLET BY MOUTH ONE TIME FOR 1 DOSE. (Patient not taking: Reported on 12/18/2024), Disp: , Rfl:     fluconazole (DIFLUCAN) 200 mg tablet, Take 200 mg by mouth daily (Patient not taking: Reported on 1/5/2025), Disp: , Rfl:     fluticasone (Flovent HFA) 110 MCG/ACT inhaler, Inhale 2 puffs 2 (two) times a day (Patient not taking: Reported on 1/5/2025), Disp: , Rfl:     gabapentin (NEURONTIN) 300 mg capsule, Take 300 mg by mouth 2 (two) times a day (Patient not taking: Reported on 12/18/2024), Disp: , Rfl:     Hydrocod Ermias-Chlorphe Ermias ER (TUSSIONEX) 10-8 mg/5 mL ER suspension, Take 5 mL by mouth every 12 (twelve) hours as needed (Patient not taking: Reported on 12/18/2024), Disp: , Rfl:     Insulin Pen Needle 32G X 6 MM MISC, BD Ultra-Fine Sherrill Pen Needle 32 gauge x 5/32\" (Patient not taking: Reported on 12/18/2024), Disp: , Rfl:     ipratropium-albuterol (DUO-NEB) 0.5-2.5 mg/3 mL nebulizer solution, Inhale 3 mL 4 (four) times a day (Patient not taking: Reported on 1/5/2025), Disp: , Rfl:     levofloxacin (LEVAQUIN) 750 mg tablet, TAKE 1 TABLET (750 MG TOTAL) BY MOUTH DAILY FOR 14 DAYS. (Patient not taking: Reported on 12/18/2024), Disp: , Rfl:     Lidocaine Viscous HCl (XYLOCAINE) 2 % mucosal solution, Swish and spit 15 mL 4 (four) times a day as needed for mouth pain or discomfort (Patient not taking: Reported on 12/18/2024), Disp: 100 mL, Rfl: 0    LORazepam (ATIVAN) 0.5 mg tablet, Take 1 tablet by mouth 2 (two) times a day as needed (Patient not taking: Reported on 1/5/2025), Disp: , Rfl:    "  losartan (COZAAR) 25 mg tablet, TAKE 1 TABLET (25 MG TOTAL) BY MOUTH DAILY. (Patient not taking: Reported on 12/18/2024), Disp: , Rfl:     meclizine (ANTIVERT) 25 mg tablet, TAKE 1 TABLET BY MOUTH THREE TIMES A DAY AS NEEDED FOR DIZZINESS (Patient not taking: Reported on 1/5/2025), Disp: , Rfl: 1    metFORMIN (GLUCOPHAGE-XR) 500 mg 24 hr tablet, TAKE 1 TABLET BY MOUTH TWICE A DAY WITH MEALS (Patient not taking: Reported on 1/5/2025), Disp: , Rfl:     methylphenidate (RITALIN) 20 MG tablet, TAKE 1 TABLET BY MOUTH TWICE A DAY FOR ONGOING THERAPY (Patient not taking: Reported on 1/5/2025), Disp: , Rfl:     metoclopramide (Reglan) 10 mg tablet, Take 1 tablet (10 mg total) by mouth 3 (three) times a day as needed (nausea, headache) (Patient not taking: Reported on 1/5/2025), Disp: 15 tablet, Rfl: 0    mirtazapine (REMERON) 7.5 MG tablet, Take 1 tablet (7.5 mg total) by mouth daily at bedtime for 30 days (Patient not taking: Reported on 12/18/2024), Disp: 30 tablet, Rfl: 0    mometasone (Asmanex, 120 Metered Doses,) 220 mcg/actuation inhaler, , Disp: , Rfl:     Ozempic, 2 MG/DOSE, 8 MG/3ML injection pen, INJECT 2 MG SUBCUTANEOUSLY EVERY 7 DAYS (Patient not taking: Reported on 1/5/2025), Disp: , Rfl:     predniSONE 10 mg tablet, TAKE 4 TABLETS FOR 3 DAYS THEN TAKE 3 TABLETS FOR 3 DAYS THEN TAKE 2 TABLETS FOR 3 DAYS THEN TAKE 1 TABLET FOR 3 DAYS (Patient not taking: Reported on 12/18/2024), Disp: , Rfl:     predniSONE 20 mg tablet, TAKE 2 TABS BY MOUTH DAILY FOR 7 DAYS (Patient not taking: Reported on 1/5/2025), Disp: , Rfl:     promethazine-dextromethorphan (PHENERGAN-DM) 6.25-15 mg/5 mL oral syrup, TAKE 5 MILLILITERS BY MOUTH 4 TIMES A DAY AS NEEDED FOR COUGH (Patient not taking: Reported on 12/18/2024), Disp: , Rfl:     semaglutide, 0.25 or 0.5 mg/dose, (Ozempic, 0.25 or 0.5 MG/DOSE,) 2 mg/3 mL injection pen, INJECT 0.5MG UNDER THE SKIN EVERY 7 DAYS (Patient not taking: Reported on 1/5/2025), Disp: , Rfl:     sucralfate  (CARAFATE) 1 g tablet, TAKE 1 TABLET (1 G TOTAL) BY MOUTH 3 (THREE) TIMES A DAY FOR 14 DAYS (Patient not taking: Reported on 12/18/2024), Disp: 42 tablet, Rfl: 0    topiramate (TOPAMAX) 100 mg tablet, Take 1 tablet (100 mg total) by mouth daily at bedtime (Patient not taking: Reported on 1/5/2025), Disp: 30 tablet, Rfl: 6    Trintellix 20 MG tablet, Take 20 mg by mouth every morning (Patient not taking: Reported on 12/18/2024), Disp: , Rfl:     Ubrogepant (UBRELVY) 100 MG tablet, Take 1 tablet (100 mg) one time as needed for migraine. May repeat one additional tablet (100 mg) at least two hours after the first dose. Do not use more than two doses per day, or for more than eight days per month. (Patient not taking: Reported on 1/5/2025), Disp: 9 tablet, Rfl: 6    Vortioxetine HBr (Trintellix) 5 MG tablet, Take 1 tablet by mouth every morning (Patient not taking: Reported on 12/18/2024), Disp: , Rfl:     Current Allergies     Allergies as of 01/05/2025 - Reviewed 01/05/2025   Allergen Reaction Noted    Fentanyl Other (See Comments) 01/09/2018    Amoxicillin-pot clavulanate GI Intolerance 06/06/2017    Cefdinir GI Intolerance 06/06/2017    Cephalosporins GI Intolerance 06/06/2017    Erythromycin GI Intolerance 06/06/2017    Pollen extract  08/22/2017    Sulfa antibiotics Other (See Comments) 09/23/2018    Sulfasalazine  10/17/2005            The following portions of the patient's history were reviewed and updated as appropriate: allergies, current medications, past family history, past medical history, past social history, past surgical history and problem list.     Past Medical History:   Diagnosis Date    Acid reflux     Anxiety     Asthma     Depression     Fibromyalgia     Fibromyalgia     GERD (gastroesophageal reflux disease)     Headache     Hyperlipidemia     Hypertension     Hypothyroidism     IBS (irritable bowel syndrome)     Infertility, female     Insulin resistance     Obesity (BMI 30-39.9)     Occipital  neuralgia     Papilledema     PCOS (polycystic ovarian syndrome)     Pseudotumor cerebri     Tinnitus     Tremor        Past Surgical History:   Procedure Laterality Date    ANKLE FRACTURE SURGERY Right     hardware     SECTION      CHOLECYSTECTOMY      DILATION AND CURETTAGE OF UTERUS      ENDOMETRIAL BIOPSY      FL LUMBAR PUNCTURE DIAGNOSTIC  2018    HYSTERECTOMY      TONSILLECTOMY AND ADENOIDECTOMY      TUBAL LIGATION      UMBILICAL HERNIA REPAIR      UPPER GASTROINTESTINAL ENDOSCOPY         Family History   Problem Relation Age of Onset    Hypertension Mother     Hypothyroidism Mother     Hypertension Father     Hypothyroidism Father     Insulin resistance Father     Hypothyroidism Sister          Medications have been verified.        Objective   /90   Pulse 88   Temp 98.6 °F (37 °C)   Wt 90.7 kg (200 lb)   SpO2 99%   BMI 37.79 kg/m²        Physical Exam     Physical Exam  Vitals and nursing note reviewed.   Constitutional:       General: She is not in acute distress.     Appearance: Normal appearance. She is not ill-appearing.   HENT:      Head: Normocephalic and atraumatic.      Right Ear: Hearing, tympanic membrane and ear canal normal.      Left Ear: Hearing, tympanic membrane and ear canal normal.      Ears:      Comments: Bilateral external canal erythema.      Nose: Congestion and rhinorrhea present.      Mouth/Throat:      Mouth: Mucous membranes are moist.      Pharynx: Posterior oropharyngeal erythema present. No oropharyngeal exudate.   Eyes:      Pupils: Pupils are equal, round, and reactive to light.   Cardiovascular:      Rate and Rhythm: Normal rate and regular rhythm.      Pulses: Normal pulses.      Heart sounds: Normal heart sounds.   Pulmonary:      Effort: Pulmonary effort is normal. No respiratory distress.      Breath sounds: Normal breath sounds. No decreased air movement. No decreased breath sounds, wheezing or rhonchi.      Comments: Dry coughing fits witnessed  in office, no wheezing.   Musculoskeletal:      Cervical back: Normal range of motion and neck supple.   Skin:     General: Skin is warm and dry.   Neurological:      Mental Status: She is alert.

## 2025-03-07 ENCOUNTER — TELEPHONE (OUTPATIENT)
Age: 45
End: 2025-03-07

## 2025-03-10 ENCOUNTER — TELEPHONE (OUTPATIENT)
Dept: NEUROLOGY | Facility: CLINIC | Age: 45
End: 2025-03-10

## 2025-03-10 NOTE — TELEPHONE ENCOUNTER
Tried calling PT to confirm upcoming appointment on 3/19 at 1pm with Dr. Pemberton at the [address] [place] [address] location. Left Pt VM with call back number.

## 2025-03-11 ENCOUNTER — TELEPHONE (OUTPATIENT)
Age: 45
End: 2025-03-11

## 2025-03-11 NOTE — TELEPHONE ENCOUNTER
"Ubrelvy Prior Authorization      KEY: [id number]    DENIED: \"Coverage is provided for patients who tried at least one triptan or have a contraindication to triptan(s) according to the prescriber.\"    Sumatriptan found in past med list. Will resubmit.      "

## 2025-03-13 NOTE — TELEPHONE ENCOUNTER
KEY: [id number]    Pending Determination: DENIED    Received same denial reason. Please contact patient's insurance company. Patient has tried Sumatriptan.  Thank you!

## 2025-03-19 ENCOUNTER — OFFICE VISIT (OUTPATIENT)
Dept: NEUROLOGY | Facility: CLINIC | Age: 45
End: 2025-03-19
Payer: COMMERCIAL

## 2025-03-19 VITALS
DIASTOLIC BLOOD PRESSURE: 88 MMHG | BODY MASS INDEX: 37.95 KG/M2 | OXYGEN SATURATION: 98 % | WEIGHT: 201 LBS | HEART RATE: 100 BPM | HEIGHT: 61 IN | TEMPERATURE: 98.3 F | SYSTOLIC BLOOD PRESSURE: 138 MMHG

## 2025-03-19 DIAGNOSIS — G47.33 OSA (OBSTRUCTIVE SLEEP APNEA): Primary | ICD-10-CM

## 2025-03-19 DIAGNOSIS — E28.319 PREMATURE MENOPAUSE: ICD-10-CM

## 2025-03-19 DIAGNOSIS — G93.2 PSEUDOTUMOR CEREBRI: ICD-10-CM

## 2025-03-19 PROCEDURE — 99213 OFFICE O/P EST LOW 20 MIN: CPT | Performed by: PSYCHIATRY & NEUROLOGY

## 2025-03-19 PROCEDURE — 96372 THER/PROPH/DIAG INJ SC/IM: CPT | Performed by: PSYCHIATRY & NEUROLOGY

## 2025-03-19 RX ORDER — IPRATROPIUM BROMIDE 21 UG/1
1-2 SPRAY, METERED NASAL 4 TIMES DAILY PRN
COMMUNITY
Start: 2025-01-29

## 2025-03-19 RX ORDER — FLUOXETINE 20 MG/1
1 TABLET, FILM COATED ORAL DAILY
COMMUNITY
Start: 2025-01-29 | End: 2025-03-19

## 2025-03-19 RX ORDER — ARIPIPRAZOLE 2 MG/1
2 TABLET ORAL
COMMUNITY
Start: 2025-02-27

## 2025-03-19 RX ORDER — FLUOXETINE HYDROCHLORIDE 40 MG/1
1 CAPSULE ORAL DAILY
COMMUNITY
Start: 2025-02-17

## 2025-03-19 RX ORDER — KETOROLAC TROMETHAMINE 30 MG/ML
30 INJECTION, SOLUTION INTRAMUSCULAR; INTRAVENOUS ONCE
Status: COMPLETED | OUTPATIENT
Start: 2025-03-19 | End: 2025-03-19

## 2025-03-19 RX ORDER — AZELASTINE HYDROCHLORIDE 137 UG/1
SPRAY, METERED NASAL
COMMUNITY
Start: 2025-01-29

## 2025-03-19 RX ADMIN — KETOROLAC TROMETHAMINE 30 MG: 30 INJECTION, SOLUTION INTRAMUSCULAR; INTRAVENOUS at 14:16
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
## 2025-03-19 NOTE — ASSESSMENT & PLAN NOTE
Patient with a history of IIH. Only available opening pressure was 27 in 2018. Today she rep[orts the migraines have been waking her up at night. Patient does not want to do a lumbar puncture. Will get an updated MRI brain.   Orders:    MRI brain pituitary wo and w contrast; Future    ketorolac (TORADOL) injection 30 mg

## 2025-03-28 ENCOUNTER — HOSPITAL ENCOUNTER (OUTPATIENT)
Dept: MRI IMAGING | Facility: HOSPITAL | Age: 45
End: 2025-03-28
Attending: PSYCHIATRY & NEUROLOGY
Payer: COMMERCIAL

## 2025-03-28 DIAGNOSIS — G93.2 PSEUDOTUMOR CEREBRI: ICD-10-CM

## 2025-03-28 DIAGNOSIS — E28.319 PREMATURE MENOPAUSE: ICD-10-CM

## 2025-03-28 PROCEDURE — 70553 MRI BRAIN STEM W/O & W/DYE: CPT

## 2025-03-28 PROCEDURE — A9585 GADOBUTROL INJECTION: HCPCS | Performed by: PSYCHIATRY & NEUROLOGY

## 2025-03-28 RX ORDER — GADOBUTROL 604.72 MG/ML
9 INJECTION INTRAVENOUS
Status: COMPLETED | OUTPATIENT
Start: 2025-03-28 | End: 2025-03-28

## 2025-03-28 RX ADMIN — GADOBUTROL 9 ML: 604.72 INJECTION INTRAVENOUS at 23:34

## 2025-04-10 ENCOUNTER — RESULTS FOLLOW-UP (OUTPATIENT)
Dept: NEUROLOGY | Facility: CLINIC | Age: 45
End: 2025-04-10

## 2025-04-10 ENCOUNTER — PATIENT MESSAGE (OUTPATIENT)
Dept: NEUROLOGY | Facility: CLINIC | Age: 45
End: 2025-04-10

## 2025-04-11 ENCOUNTER — TELEPHONE (OUTPATIENT)
Age: 45
End: 2025-04-11

## 2025-04-11 DIAGNOSIS — G93.2 IIH (IDIOPATHIC INTRACRANIAL HYPERTENSION): Primary | ICD-10-CM

## 2025-04-11 DIAGNOSIS — G93.2 IDIOPATHIC INTRACRANIAL HYPERTENSION: Primary | ICD-10-CM

## 2025-04-11 RX ORDER — ACETAZOLAMIDE 250 MG/1
500 TABLET ORAL 3 TIMES DAILY
Qty: 120 TABLET | Refills: 2 | Status: SHIPPED | OUTPATIENT
Start: 2025-04-11

## 2025-04-11 NOTE — PROGRESS NOTES
I tried calling patient back. Went to [employer]. I left a message.     Her MRI is concerning or idiopathic intracranial hypertension. Patient is at risk of losing her vision. Per [employer] messages it seems like patient took diamox when she is already taking topamax, which is dangerous.     Patient needs to go to the ED to get a lumbar puncture. I want her to discontinue topamax and take diamox 500 mg twice daily initially. If she has no adverse side effects I would like to further increase it to 500 mg 4 times a day (or 1000 mg twice daily).

## 2025-04-11 NOTE — TELEPHONE ENCOUNTER
I sent provider pts response from My chart msg and that I tried calling pt and left a  msg to call our office back and I routed that to Dr Pemberton so she was aware pt did not go to ED per her recommendations to pt via  my chart msg (pt seen provider's msg) .    ____________________    Per provider's update today: 4/11/25        Dionne Boyle MD  Neurology Idiopathic intracranial hypertension  Dx     Progress Notes  Dionne Boyle MD (Physician)  Neurology  I tried calling patient back. Went to vo[employer]. I left a message.      Her MRI is concerning or idiopathic intracranial hypertension. Patient is at risk of losing her vision. Per [employer] messages it seems like patient took diamox when she is already taking topamax, which is dangerous.      Patient needs to go to the ED to get a lumbar puncture. I want her to discontinue topamax and take diamox 500 mg twice daily initially. If she has no adverse side effects I would like to further increase it to 500 mg 4 times a day (or 1000 mg twice daily).
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
## 2025-04-11 NOTE — TELEPHONE ENCOUNTER
Dionne Boyle MD to Kaleigh Hernandez       4/10/25  5:01 PM  Hi Kaleigh,      [facility] MRI is concerning for increased intracranial pressure in your brain. Given that your headache is bad today I hiighly recommend going to the ED. We need to get that pressure down now. If not you are at risk of losing your vision. Please reconsider lumbar puncture        Last read by Kaleigh Hernandez at 7:49AM on 4/11/2025.        4/10/25  5:19 PM  I was hoping for a medication   Kaleigh Hernandez     4/11/25  7:50 AM  Hi, I found some old diamox and took that last night.      ____________________________________________      I called patient this am and left a generic msg (unable to leave msgs per communication consent).      I requested pt to call our office back regarding her MRI results and provider advisement.    When pt calls back, have her speak to a nurse and review provider's advisement , see how pt is doing

## 2025-04-12 ENCOUNTER — APPOINTMENT (OUTPATIENT)
Dept: LAB | Facility: HOSPITAL | Age: 45
End: 2025-04-12
Payer: COMMERCIAL

## 2025-04-12 DIAGNOSIS — G93.2 IIH (IDIOPATHIC INTRACRANIAL HYPERTENSION): ICD-10-CM

## 2025-04-12 LAB
INR PPP: 0.93 (ref 0.85–1.19)
PLATELET # BLD AUTO: 338 THOUSANDS/UL (ref 149–390)
PMV BLD AUTO: 9.6 FL (ref 8.9–12.7)
PROTHROMBIN TIME: 13.2 SECONDS (ref 12.3–15)

## 2025-04-12 PROCEDURE — 85610 PROTHROMBIN TIME: CPT

## 2025-04-12 PROCEDURE — 85049 AUTOMATED PLATELET COUNT: CPT

## 2025-04-12 PROCEDURE — 36415 COLL VENOUS BLD VENIPUNCTURE: CPT

## 2025-04-15 ENCOUNTER — PREP FOR PROCEDURE (OUTPATIENT)
Dept: INTERVENTIONAL RADIOLOGY/VASCULAR | Facility: CLINIC | Age: 45
End: 2025-04-15

## 2025-04-15 DIAGNOSIS — M79.7 FIBROMYALGIA: Primary | ICD-10-CM

## 2025-04-15 DIAGNOSIS — G93.2 PSEUDOTUMOR CEREBRI: ICD-10-CM

## 2025-04-15 RX ORDER — SODIUM CHLORIDE 9 MG/ML
30 INJECTION, SOLUTION INTRAVENOUS CONTINUOUS
Status: CANCELLED | OUTPATIENT
Start: 2025-04-15

## 2025-04-15 NOTE — TELEPHONE ENCOUNTER
Patient called back as she did not receive an update from provider per msg from earlier today.    Pt advised she is taking Diamox 500 mg tab, takes 2 tabs BID  if she is having a good day . She she is having a bad day due to side effects (see below), she only takes 1 tab daily    Side Effects since taking Diamox:  Extreme nausea, there are times she can't eat  Major diarrhea  HA still (which pt finds weird due to taking Diamox)  Due to not eating much and S/E she lost 5 lbs in 1 week)    She denies any tingling in hands or feet.    Pt wants to know If she should continue taking Diamox due to SE above and if so, which dose    She is still waiting for IR to contact her to [facility], pt aware per update when Ashley RN spoke to IR, they are aware LP ordered for STAT, but they need 1-2 days to review sedation and they will call her to [facility].    Pt would like to know if provider can contact them in anyway to move this up, to get her scheduled right away since our nurse already tried.    Pt informed she needs to go to other appts today at 3 pm and wont be done until after office is close, pt requesting provider to send her a my chart msg with an update

## 2025-04-15 NOTE — TELEPHONE ENCOUNTER
"FOLLOW UP: Dr. Nilay Boyle Please advise. Thank you!    Desire was also calling to follow up with LP as no one has contacted her yet. CTS called out to IR Anish [phone] to follow up and they said they need to review for sedation and normally it is 1-2 days for review. They will call the Patient directly once decided. CTS did confirm they know it is ordered STAT.     REASON FOR CONVERSATION: MRI Results and IR Referral for LP    SYMPTOMS: Headaches, weight loss, frequent urination, diarrhea, tingling of hands and feet.     OTHER: Patient prefers a call back to [phone] and we may leave a detailed voicemail.    DISPOSITION: Discuss with Provider and Call Back Patient (overriding Discuss With PCP and Callback by Nurse Within 1 Hour)        Reason for Disposition   Caller has URGENT medicine question about med that PCP or specialist prescribed and triager unable to answer question    Answer Assessment - Initial Assessment Questions  1. NAME of MEDICINE: \"What medicine(s) are you calling about?\"      Diamox     2. QUESTION: \"What is your question?\" (e.g., double dose of medicine, side effect)      Patient wants to speak with the provider directly if possible. Patient wants to know what to do and is confused why she is getting side effects she is getting now she did not before and why she is not getting relief.     3. PRESCRIBER: \"Who prescribed the medicine?\" Reason: if prescribed by specialist, call should be referred to that group.      Dr. Nilay Boyle    4. SYMPTOMS: \"Do you have any symptoms?\" If Yes, ask: \"What symptoms are you having?\"  \"How bad are the symptoms (e.g., mild, moderate, severe)      Patient said she went off diamox in the past because she had trouble with her eyes and mouth as it is a diuretic. She tried going back on it six days ago, taking 2 tablets in morning and 2 tablets at night and she is very nauseated, urinating frequently, and has horrible diarreha. She can't drink soda, her " hands and feet are tingling. She said its like it was not the same diamox she tried in the past. These are different symptoms. Patient lost weight, described as about 5 pounds in the 6 days she has been taking the Diamox. Before when she tried Diamox after 2-3 days of taking it, her headache would go away but she is still having headaches after 6 days.    Protocols used: Medication Question Call-Adult-[place]

## 2025-04-16 ENCOUNTER — TELEPHONE (OUTPATIENT)
Dept: NEUROLOGY | Facility: CLINIC | Age: 45
End: 2025-04-16

## 2025-04-16 NOTE — TELEPHONE ENCOUNTER
Spoke with PT and provided central scheduling number for her to schedule a lumbar puncture STAT. Let her know Dr. Pemberton would like for her to get this done as soon as possible.

## 2025-04-16 NOTE — TELEPHONE ENCOUNTER
Per a secure chart msg from Dr Pemberton today at 1214 pm.   I called diagnostic radiology at extension [phone]. No answer I left voicemail and number to call back.  I'll have my MA help as well get her in. If not, she willl have to go to the ED     ____________________    I also fwd this msg update to pt via My chart as well. So she has an update that provider called the dept as well

## 2025-04-17 NOTE — TELEPHONE ENCOUNTER
Dionne Boyle MD  You6 hours ago (9:09 AM)       I called diagnostic radiology and IR yesterday. I left two voicemails. Padmiin answering voice messages got back to me today. I expressed the urgent matter of getting this done. Padmini will reach out to her manager to get patient scheduled.     ________________________    I reviewed chart and seen Pt is scheduled for LP on 4/23/25

## 2025-04-18 ENCOUNTER — TELEPHONE (OUTPATIENT)
Dept: RADIOLOGY | Facility: HOSPITAL | Age: 45
End: 2025-04-18

## 2025-04-18 NOTE — PRE-PROCEDURE INSTRUCTIONS
Pre-procedure Instructions for Interventional Radiology  [address] [address]  INTERVENTIONAL RADIOLOGY [phone]    You are scheduled for a/an Lumbar Puncture.    On Wednesday 4/23/25.    Your tentative arrival time is 7:30 AM.  Short stay will notify you the day before your procedure with the exact arrival time and the location to arrive.    To prepare for your procedure:  Please arrange for someone to drive you home after the procedure and stay with you until the next morning if you are instructed to do so.  This is typically for patients receiving some type of sedative or anesthetic for the procedure.  DO NOT EAT OR DRINK ANYTHING after midnight on the evening before your procedure including candy & gum.  ONLY SIPS OF WATER with your medications are allowed on the morning of your procedure.  TAKE ALL OF YOUR REGULAR MEDICATIONS THE MORNING OF YOUR PROCEDURE with sips of water!  We may call you to stop some of your blood sugar, blood pressure and blood thinning medications depending on the procedure.  Please take all of these medications unless we instruct you to stop them.  If you have an allergy to x-ray dye, please contact Interventional Radiology for an x-ray dye preparation which usually consists of an oral steroid and Benadryl.  If you wear a Glucose Monitor, you may be asked to remove it for your procedure if we are using x-ray.  These devices need to be removed when we are imaging with x-ray near the device since the radiation can cause the unit to malfunction.  If possible and not too inconvenient, you may want to schedule your exam closer to day 14 of your 14 day device so your device is not wasted.    The day of your procedure:  Bring a list of the medications you take at home.  Bring medications you take for breathing problems (such as inhalers), medications for chest pain, or both.  Bring a case for your glasses or contacts.  Bring your insurance card  and a form of photo ID.  Please leave all valuables such as credit cards and jewelry at home.  Report to the admitting office to the left of the registration desk in the main lobby at the [facility], Entrance B.  You will then be directed to the Short Stay Center.  While your procedure is being performed, your family may wait in the Radiology Waiting Room on the 1st floor in Radiology.  if they need to leave, they may provide a number to be called following the procedure.   Be prepared to stay overnight just in case. Sometimes procedures will indicate the need for further observation or treatment.   If you are scheduled for a follow-up visit with the Interventional Radiologist after your procedure, you will be called with a date and time.    Special Instructions (Medications to stop taking before your procedure etc.):  Ozempic last dos 4/4/25 restart 4/24/25. Metformin hold AM 4/23/25.

## 2025-04-23 ENCOUNTER — ANESTHESIA EVENT (OUTPATIENT)
Dept: RADIOLOGY | Facility: HOSPITAL | Age: 45
End: 2025-04-23
Payer: COMMERCIAL

## 2025-04-23 ENCOUNTER — ANESTHESIA (OUTPATIENT)
Dept: RADIOLOGY | Facility: HOSPITAL | Age: 45
End: 2025-04-23
Payer: COMMERCIAL

## 2025-04-23 ENCOUNTER — HOSPITAL ENCOUNTER (OUTPATIENT)
Dept: RADIOLOGY | Facility: HOSPITAL | Age: 45
Discharge: HOME | End: 2025-04-23
Attending: RADIOLOGY
Payer: COMMERCIAL

## 2025-04-23 VITALS
WEIGHT: 198 LBS | DIASTOLIC BLOOD PRESSURE: 68 MMHG | TEMPERATURE: 97.8 F | OXYGEN SATURATION: 100 % | SYSTOLIC BLOOD PRESSURE: 117 MMHG | HEIGHT: 61 IN | BODY MASS INDEX: 37.38 KG/M2 | HEART RATE: 77 BPM | RESPIRATION RATE: 17 BRPM

## 2025-04-23 DIAGNOSIS — G93.2 PSEUDOTUMOR CEREBRI: ICD-10-CM

## 2025-04-23 LAB
BASOPHILS # BLD AUTO: 0.06 THOUSANDS/ÂΜL (ref 0–0.1)
BASOPHILS NFR BLD AUTO: 1 % (ref 0–1)
EOSINOPHIL # BLD AUTO: 0.14 THOUSAND/ÂΜL (ref 0–0.61)
EOSINOPHIL NFR BLD AUTO: 1 % (ref 0–6)
ERYTHROCYTE [DISTWIDTH] IN BLOOD BY AUTOMATED COUNT: 14.2 % (ref 11.6–15.1)
HCT VFR BLD AUTO: 37.3 % (ref 34.8–46.1)
HGB BLD-MCNC: 12 G/DL (ref 11.5–15.4)
IMM GRANULOCYTES # BLD AUTO: 0.1 THOUSAND/UL (ref 0–0.2)
IMM GRANULOCYTES NFR BLD AUTO: 1 % (ref 0–2)
INR PPP: 0.98 (ref 0.85–1.19)
LYMPHOCYTES # BLD AUTO: 2.39 THOUSANDS/ÂΜL (ref 0.6–4.47)
LYMPHOCYTES NFR BLD AUTO: 20 % (ref 14–44)
MCH RBC QN AUTO: 27.9 PG (ref 26.8–34.3)
MCHC RBC AUTO-ENTMCNC: 32.2 G/DL (ref 31.4–37.4)
MCV RBC AUTO: 87 FL (ref 82–98)
MONOCYTES # BLD AUTO: 0.7 THOUSAND/ÂΜL (ref 0.17–1.22)
MONOCYTES NFR BLD AUTO: 6 % (ref 4–12)
NEUTROPHILS # BLD AUTO: 8.48 THOUSANDS/ÂΜL (ref 1.85–7.62)
NEUTS SEG NFR BLD AUTO: 71 % (ref 43–75)
NRBC BLD AUTO-RTO: 0 /100 WBCS
PLATELET # BLD AUTO: 274 THOUSANDS/UL (ref 149–390)
PMV BLD AUTO: 9.6 FL (ref 8.9–12.7)
PROTHROMBIN TIME: 13.2 SECONDS (ref 12.3–15)
RBC # BLD AUTO: 4.3 MILLION/UL (ref 3.81–5.12)
WBC # BLD AUTO: 11.87 THOUSAND/UL (ref 4.31–10.16)

## 2025-04-23 PROCEDURE — 85025 COMPLETE CBC W/AUTO DIFF WBC: CPT | Performed by: RADIOLOGY

## 2025-04-23 PROCEDURE — 62328 DX LMBR SPI PNXR W/FLUOR/CT: CPT | Performed by: RADIOLOGY

## 2025-04-23 PROCEDURE — 62328 DX LMBR SPI PNXR W/FLUOR/CT: CPT

## 2025-04-23 PROCEDURE — 85610 PROTHROMBIN TIME: CPT | Performed by: RADIOLOGY

## 2025-04-23 RX ORDER — LIDOCAINE HYDROCHLORIDE 10 MG/ML
INJECTION, SOLUTION EPIDURAL; INFILTRATION; INTRACAUDAL; PERINEURAL AS NEEDED
Status: COMPLETED | OUTPATIENT
Start: 2025-04-23 | End: 2025-04-23

## 2025-04-23 RX ORDER — ONDANSETRON 2 MG/ML
INJECTION INTRAMUSCULAR; INTRAVENOUS AS NEEDED
Status: DISCONTINUED | OUTPATIENT
Start: 2025-04-23 | End: 2025-04-23

## 2025-04-23 RX ORDER — MIDAZOLAM HYDROCHLORIDE 2 MG/2ML
INJECTION, SOLUTION INTRAMUSCULAR; INTRAVENOUS AS NEEDED
Status: DISCONTINUED | OUTPATIENT
Start: 2025-04-23 | End: 2025-04-23

## 2025-04-23 RX ORDER — SODIUM CHLORIDE 9 MG/ML
30 INJECTION, SOLUTION INTRAVENOUS CONTINUOUS
Status: DISCONTINUED | OUTPATIENT
Start: 2025-04-23 | End: 2025-04-24 | Stop reason: HOSPADM

## 2025-04-23 RX ORDER — PROPOFOL 10 MG/ML
INJECTION, EMULSION INTRAVENOUS AS NEEDED
Status: DISCONTINUED | OUTPATIENT
Start: 2025-04-23 | End: 2025-04-23

## 2025-04-23 RX ORDER — PROPOFOL 10 MG/ML
INJECTION, EMULSION INTRAVENOUS CONTINUOUS PRN
Status: DISCONTINUED | OUTPATIENT
Start: 2025-04-23 | End: 2025-04-23

## 2025-04-23 RX ADMIN — SODIUM CHLORIDE 30 ML/HR: 0.9 INJECTION, SOLUTION INTRAVENOUS at 07:53

## 2025-04-23 RX ADMIN — PROPOFOL 90 MCG/KG/MIN: 10 INJECTION, EMULSION INTRAVENOUS at 08:56

## 2025-04-23 RX ADMIN — PROPOFOL 50 MG: 10 INJECTION, EMULSION INTRAVENOUS at 08:56

## 2025-04-23 RX ADMIN — LIDOCAINE HYDROCHLORIDE 10 ML: 10 INJECTION, SOLUTION EPIDURAL; INFILTRATION; INTRACAUDAL; PERINEURAL at 09:13

## 2025-04-23 RX ADMIN — ONDANSETRON 4 MG: 2 INJECTION INTRAMUSCULAR; INTRAVENOUS at 09:01

## 2025-04-23 RX ADMIN — MIDAZOLAM 2 MG: 1 INJECTION INTRAMUSCULAR; INTRAVENOUS at 08:45

## 2025-04-23 NOTE — ANESTHESIA POSTPROCEDURE EVALUATION
Post-Op Assessment Note    CV Status:  Stable  Pain Score: 0    Pain management: adequate       Mental Status:  Alert   Hydration Status:  Stable   PONV Controlled:  None   Airway Patency:  Patent  Two or more mitigation strategies used for obstructive sleep apnea   Post Op Vitals Reviewed: Yes    No anethesia notable event occurred.    Staff: Anesthesiologist           Last Filed PACU Vitals:  Vitals Value Taken Time   Temp     Pulse     BP     Resp     SpO2

## 2025-04-23 NOTE — DISCHARGE INSTRUCTIONS
Lumbar Puncture     WHAT YOU NEED TO KNOW:   Lumbar puncture (LP) is a procedure in which a needle is inserted in your back and into your spinal canal. This is usually done to collect cerebrospinal fluid (CSF) to check for an infection, inflammation, bleeding, or other conditions that affect the brain. CSF is a clear, protective fluid that flows around the brain and inside the spinal canal. LP may also be done to remove CSF to reduce pressure in the brain.     DISCHARGE INSTRUCTIONS:     Follow up with your healthcare provider as directed: Write down your questions so you remember to ask them during your visits.     Post-lumbar puncture headache: You may develop a headache during the first few hours after your LP that may last for several days. The headache may be mild to severe and may get worse when you sit or stand. The following may help ease a post-lumbar puncture headache:  Drink plenty of liquids: You should drink more liquid than usual after your LP. Ask how much liquid is right for you. Caffeine may be used to treat a headache. Drinks, such as coffee, tea, or some sodas, have caffeine. Ask a Do not drink alcohol.    Lie down: If you have a headache after your lumbar puncture, it may be helpful to lie down and rest.  You may have a slight soreness over the LP area. This is normal.  Remove the band aid or dressing in 24 hours.    Contact Interventional Radiology imediately  at [phone] if any of the following occur:  You have a severe headache that does not get better after you lie down.  Persistent nausea or vomiting   You have a fever.   You have a stiff neck or have trouble thinking clearly.   Your legs, feet, or other parts below the waist feel numb, tingly, or weak.   You have bleeding or a discharge coming from the area where the needle was put into your back.   You have severe pain in your back or neck.Lumbar Puncture     WHAT YOU NEED TO KNOW:   Lumbar puncture (LP) is a procedure in which a  needle is inserted in your back and into your spinal canal. This is usually done to collect cerebrospinal fluid (CSF) to check for an infection, inflammation, bleeding, or other conditions that affect the brain. CSF is a clear, protective fluid that flows around the brain and inside the spinal canal. LP may also be done to remove CSF to reduce pressure in the brain.     DISCHARGE INSTRUCTIONS:     Follow up with your healthcare provider as directed: Write down your questions so you remember to ask them during your visits.     Post-lumbar puncture headache: You may develop a headache during the first few hours after your LP that may last for several days. The headache may be mild to severe and may get worse when you sit or stand. The following may help ease a post-lumbar puncture headache:  Drink plenty of liquids: You should drink more liquid than usual after your LP. Ask how much liquid is right for you. Caffeine may be used to treat a headache. Drinks, such as coffee, tea, or some sodas, have caffeine. Ask a Do not drink alcohol.    Lie down: If you have a headache after your lumbar puncture, it may be helpful to lie down and rest.  You may have a slight soreness over the LP area. This is normal.  Remove the band aid or dressing in 24 hours.    Contact Interventional Radiology imediately  at [phone] ([place] PATIENTS: Contact Interventional Radiology at [phone]) ([place] PATIENTS: Contact Interventional Radiology at [phone]) if any of the following occur:  You have a severe headache that does not get better after you lie down.  Persistent nausea or vomiting   You have a fever.   You have a stiff neck or have trouble thinking clearly.   Your legs, feet, or other parts below the waist feel numb, tingly, or weak.   You have bleeding or a discharge coming from the area where the needle was put into your back.   You have severe pain in your back or neck.

## 2025-04-23 NOTE — ANESTHESIA PREPROCEDURE EVALUATION
Procedure:  IR SPINE PROCEDURE    Relevant Problems   CARDIO   (+) Benign hypertension   (+) Varicose veins of both lower extremities      ENDO   (+) Hypothyroidism   (+) Type 2 diabetes mellitus (HCC)      MUSCULOSKELETAL   (+) Fibromyalgia      NEURO/PSYCH   (+) Depression with anxiety   (+) Fibromyalgia        Physical Exam    Airway    Mallampati score: II  TM Distance: >3 FB  Neck ROM: full     Dental   No notable dental hx     Cardiovascular  Rhythm: regular, Rate: normal, Cardiovascular exam normal    Pulmonary  Pulmonary exam normal Breath sounds clear to auscultation, No wheezes    Other Findings  post-pubertal.      Anesthesia Plan  ASA Score- 2     Anesthesia Type- IV sedation with anesthesia with ASA Monitors.         Additional Monitors:     Airway Plan: NTT.           Plan Factors-Exercise tolerance (METS): >4 METS.    Chart reviewed. EKG reviewed. Imaging results reviewed. Existing labs reviewed. Patient summary reviewed.    Patient is not a current smoker.  Patient did not smoke on day of surgery.    Obstructive sleep apnea risk education given perioperatively.        Induction- intravenous.    Postoperative Plan-     Perioperative Resuscitation Plan - Level 1 - Full Code.       Informed Consent- Anesthetic plan and risks discussed with patient and spouse.  I personally reviewed this patient with the CRNA. Discussed and agreed on the Anesthesia Plan with the CRNA..      NPO Status:  Vitals Value Taken Time   Date of last liquid 04/22/25 04/23/25 0737   Time of last liquid 2000 04/23/25 0737   Date of last solid 04/22/25 04/23/25 0737   Time of last solid 2000 04/23/25 0737

## 2025-04-23 NOTE — H&P
Interventional Radiology  History and Physical 2025     Kaleigh Hernandez   1980   [id number]    Assessment/Plan:  Image guided lumbar puncture for high volume drainage with pre and post pressures    Problem List Items Addressed This Visit          Nervous and Auditory    Pseudotumor cerebri    Relevant Orders    IR spine procedure          Subjective:     Patient ID: Kaleigh Hernandez is a 44 y.o. female.    History of Present Illness  43 yo female with persistent headaches found to have finding suspicious for elevated intracranial pressures referred for high volume lumbar puncture. She has a bad experience about 10 years ago with an LP and will require anesthesia for sedation.    Review of Systems   Constitutional: Negative.    HENT: Negative.     Eyes: Negative.    Respiratory: Negative.     Cardiovascular: Negative.    Gastrointestinal: Negative.    Endocrine: Negative.    Genitourinary: Negative.    Musculoskeletal: Negative.    Skin: Negative.    Allergic/Immunologic: Negative.    Neurological:  Positive for headaches.   Hematological: Negative.    Psychiatric/Behavioral: Negative.           Past Medical History:   Diagnosis Date    Acid reflux     Anxiety     Asthma     Depression     Fibromyalgia     Fibromyalgia     GERD (gastroesophageal reflux disease)     Headache     Hyperlipidemia     Hypertension     Hypothyroidism     IBS (irritable bowel syndrome)     Infertility, female     Insulin resistance     Obesity (BMI 30-39.9)     Occipital neuralgia     Papilledema     PCOS (polycystic ovarian syndrome)     Pseudotumor cerebri     Tinnitus     Tremor         Past Surgical History:   Procedure Laterality Date    ANKLE FRACTURE SURGERY Right     hardware     SECTION      CHOLECYSTECTOMY      DILATION AND CURETTAGE OF UTERUS      ENDOMETRIAL BIOPSY      FL LUMBAR PUNCTURE DIAGNOSTIC  2018    HYSTERECTOMY      TONSILLECTOMY AND ADENOIDECTOMY      TUBAL LIGATION      UMBILICAL HERNIA REPAIR       UPPER GASTROINTESTINAL ENDOSCOPY          Social History     Tobacco Use   Smoking Status Never   Smokeless Tobacco Never        Social History     Substance and Sexual Activity   Alcohol Use Not Currently    Alcohol/week: 1.0 standard drink of alcohol    Types: 1 Standard drinks or equivalent per week    Comment: social        Social History     Substance and Sexual Activity   Drug Use No        Allergies   Allergen Reactions    Fentanyl Other (See Comments)     PT STATES DOES NOT WANT FENTANYL    Other reaction(s): Nausea and/or vomiting    PT STATES DOES NOT WANT FENTANYL - nightmare reaction    Amoxicillin-Pot Clavulanate GI Intolerance    Cefdinir GI Intolerance    Cephalosporins GI Intolerance    Erythromycin GI Intolerance    Pollen Extract     Sulfa Antibiotics Other (See Comments)     Unknown, pt was told to stay away from sulfa since child    Sulfasalazine      has ibs and vomits  has ibs and vomits         Current Outpatient Medications   Medication Sig Dispense Refill    acetaZOLAMIDE (DIAMOX) 250 mg tablet Take 2 tablets (500 mg total) by mouth 3 (three) times a day 120 tablet 2    Albuterol Sulfate (VENTOLIN HFA IN) Ventolin HFA 90 mcg/actuation aerosol inhaler      amLODIPine (NORVASC) 10 mg tablet Take 1 tablet (10 mg total) by mouth daily 21 tablet 0    ARIPiprazole (ABILIFY) 2 mg tablet Take 2 mg by mouth daily at bedtime      Azelastine HCl 137 MCG/SPRAY SOLN SPRAY 1-2 SPRAYS INTO EACH NOSTRIL TWICE A DAY AS DIRECTED      busPIRone (BUSPAR) 10 mg tablet Take 10 mg by mouth 2 (two) times a day      famotidine (PEPCID) 40 MG tablet Take 1 tablet (40 mg total) by mouth daily at bedtime At lunch and before bedtime as needed 180 tablet 1    FLUoxetine (PROzac) 20 mg capsule Take 60 mg by mouth daily      FLUoxetine (PROzac) 40 MG capsule Take 1 capsule by mouth in the morning      gabapentin (NEURONTIN) 400 mg capsule Take 400 mg by mouth 2 (two) times a day      levothyroxine 88 mcg tablet Take 88  "mcg by mouth daily      LORazepam (ATIVAN) 0.5 mg tablet Take 1 tablet by mouth 2 (two) times a day as needed      methylphenidate (RITALIN) 20 MG tablet       metoclopramide (Reglan) 10 mg tablet Take 1 tablet (10 mg total) by mouth 3 (three) times a day as needed (nausea, headache) 15 tablet 0    montelukast (SINGULAIR) 10 mg tablet Take 1 tablet by mouth in the morning      atorvastatin (LIPITOR) 40 mg tablet TAKE 1 TABLET BY MOUTH EVERY DAY AT NIGHT (Patient not taking: Reported on 3/19/2025)      BD PEN NEEDLE ANDREA U/F 32G X 4 MM MISC   4    benzonatate (TESSALON) 200 MG capsule TAKE 1 CAPSULE (200 MG TOTAL) BY MOUTH 3 (THREE) TIMES A DAY AS NEEDED FOR COUGH. (Patient not taking: Reported on 3/19/2025)      budesonide (PULMICORT) 0.5 mg/2 mL nebulizer solution Take 2 mL (0.5 mg total) by nebulization 2 (two) times a day Rinse mouth after use. 120 mL 0    Diethylpropion HCl 25 MG TABS 1 PO TID 1 hour AC (Patient not taking: Reported on 12/18/2024) 90 tablet 1    esomeprazole (NexIUM) 40 MG capsule Take 1 capsule (40 mg total) by mouth 2 (two) times a day before meals 180 capsule 2    fenofibrate (TRICOR) 145 mg tablet       fluconazole (DIFLUCAN) 150 mg tablet TAKE 1 TABLET BY MOUTH ONE TIME FOR 1 DOSE. (Patient not taking: Reported on 12/18/2024)      fluconazole (DIFLUCAN) 200 mg tablet Take 200 mg by mouth daily (Patient not taking: Reported on 1/5/2025)      fluticasone (Flovent HFA) 110 MCG/ACT inhaler Inhale 2 puffs 2 (two) times a day (Patient not taking: Reported on 1/5/2025)      Hydrocod Ermias-Chlorphe Ermias ER (TUSSIONEX) 10-8 mg/5 mL ER suspension Take 5 mL by mouth every 12 (twelve) hours as needed (Patient not taking: Reported on 12/18/2024)      Insulin Pen Needle 32G X 6 MM MISC BD Ultra-Fine Andrea Pen Needle 32 gauge x 5/32\" (Patient not taking: Reported on 12/18/2024)      ipratropium (ATROVENT) 0.03 % nasal spray 1-2 sprays into each nostril 4 (four) times a day as needed      " ipratropium-albuterol (DUO-NEB) 0.5-2.5 mg/3 mL nebulizer solution Inhale 3 mL 4 (four) times a day (Patient not taking: Reported on 1/5/2025)      Lidocaine Viscous HCl (XYLOCAINE) 2 % mucosal solution Swish and spit 15 mL 4 (four) times a day as needed for mouth pain or discomfort (Patient not taking: Reported on 12/18/2024) 100 mL 0    losartan (COZAAR) 25 mg tablet TAKE 1 TABLET (25 MG TOTAL) BY MOUTH DAILY. (Patient not taking: Reported on 12/18/2024)      meclizine (ANTIVERT) 25 mg tablet   1    metFORMIN (GLUCOPHAGE-XR) 500 mg 24 hr tablet       mirtazapine (REMERON) 7.5 MG tablet Take 1 tablet (7.5 mg total) by mouth daily at bedtime for 30 days (Patient not taking: Reported on 3/19/2025) 30 tablet 0    mometasone (Asmanex, 120 Metered Doses,) 220 mcg/actuation inhaler  (Patient not taking: Reported on 12/18/2024)      Ozempic, 2 MG/DOSE, 8 MG/3ML injection pen       Promethazine-Codeine 6.25-10 MG/5ML SOLN TAKE 5 ML BY MOUTH EVERY 4 (FOUR) HOURS AS NEEDED FOR COUGH. (Patient not taking: Reported on 3/19/2025)      promethazine-dextromethorphan (PHENERGAN-DM) 6.25-15 mg/5 mL oral syrup TAKE 5 MILLILITERS BY MOUTH 4 TIMES A DAY AS NEEDED FOR COUGH (Patient not taking: Reported on 12/18/2024)      rimegepant sulfate (Nurtec) 75 mg TBDP       semaglutide, 0.25 or 0.5 mg/dose, (Ozempic, 0.25 or 0.5 MG/DOSE,) 2 mg/3 mL injection pen       sucralfate (CARAFATE) 1 g tablet TAKE 1 TABLET (1 G TOTAL) BY MOUTH 3 (THREE) TIMES A DAY FOR 14 DAYS 42 tablet 0    sucralfate (CARAFATE) 1 g/10 mL suspension Take 10 mL (1 g total) by mouth 4 (four) times a day (with meals and at bedtime) (Patient not taking: Reported on 3/19/2025) 473 mL 2    Trintellix 20 MG tablet Take 20 mg by mouth every morning (Patient not taking: Reported on 12/18/2024)      Ubrogepant (UBRELVY) 100 MG tablet Take 1 tablet (100 mg) one time as needed for migraine. May repeat one additional tablet (100 mg) at least two hours after the first dose. Do not  "use more than two doses per day, or for more than eight days per month. (Patient not taking: Reported on 3/19/2025) 9 tablet 6    Vortioxetine HBr (Trintellix) 5 MG tablet Take 1 tablet by mouth every morning (Patient not taking: Reported on 12/18/2024)      Wegovy 0.5 MG/0.5ML INJECT 0.5MG UNDER THE SKIN EVERY 7 DAYS (Patient not taking: Reported on 3/19/2025)       Current Facility-Administered Medications   Medication Dose Route Frequency Provider Last Rate Last Admin    sodium chloride 0.9 % infusion  30 mL/hr Intravenous Continuous Meli Pascal MD 30 mL/hr at 04/23/25 0753 30 mL/hr at 04/23/25 0753          Objective:    Vitals:    04/23/25 0757   BP: 126/78   Pulse: 83   Resp: 17   Temp: 98.3 °F (36.8 °C)   TempSrc: Oral   SpO2: 98%   Weight: 89.8 kg (198 lb)   Height: 5' 1\" (1.549 m)        Physical Exam  Constitutional:       Appearance: Normal appearance.   HENT:      Head: Normocephalic and atraumatic.      Nose: Nose normal.      Mouth/Throat:      Mouth: Mucous membranes are moist.      Pharynx: Oropharynx is clear.   Eyes:      Extraocular Movements: Extraocular movements intact.      Conjunctiva/sclera: Conjunctivae normal.   Cardiovascular:      Rate and Rhythm: Normal rate and regular rhythm.      Heart sounds: Normal heart sounds.   Pulmonary:      Effort: Pulmonary effort is normal.      Breath sounds: Normal breath sounds.   Abdominal:      General: Abdomen is flat. Bowel sounds are normal.      Palpations: Abdomen is soft.   Musculoskeletal:         General: Normal range of motion.      Cervical back: Normal range of motion.   Skin:     General: Skin is warm and dry.   Neurological:      Mental Status: She is alert and oriented to person, place, and time.   Psychiatric:         Mood and Affect: Mood normal.         Behavior: Behavior normal.           No results found for: \"BNP\"   Lab Results   Component Value Date    WBC 11.87 (H) 04/23/2025    HGB 12.0 04/23/2025    HCT 37.3 04/23/2025    MCV 87 " 04/23/2025     04/23/2025     Lab Results   Component Value Date    INR 0.93 04/12/2025    INR 1.0 10/17/2019    INR 1.01 09/23/2018    PROTIME 13.2 04/12/2025    PROTIME 13.2 09/23/2018     Lab Results   Component Value Date    PTT 25 09/23/2018         I have personally reviewed pertinent imaging and laboratory results.     Code Status: No Order  Advance Directive and Living Will:      Power of :    POLST:      This text is generated with voice recognition software. There may be translation, syntax,  or grammatical errors. If you have any questions, please contact the dictating provider.

## 2025-04-23 NOTE — SEDATION DOCUMENTATION
IR lumbar puncture performed by Dr. Chaudhary.  Anesthesia bedside. Patient tolerated well. Report called to [facility] nurse.  Bedrest for 1 hour, HOB flat,  starting at 1010. Patient transported to [facility].

## 2025-04-23 NOTE — BRIEF OP NOTE (RAD/CATH)
INTERVENTIONAL RADIOLOGY PROCEDURE NOTE    Date: 4/23/2025    Procedure:   Procedure Summary       Date: 04/23/25 Room / Location: [facility] Interventional Radiology    Anesthesia Start: 0847 Anesthesia Stop:     Procedure: IR SPINE PROCEDURE Diagnosis:       Pseudotumor cerebri      (lumbar puncture.)    Scheduled Providers: Rush Corona MD Responsible Provider: Rush Corona MD    Anesthesia Type: IV sedation with anesthesia ASA Status: 2            Preoperative diagnosis:   1. Pseudotumor cerebri         Postoperative diagnosis: Same.    Surgeon: Hardeep Chaudhary MD     Assistant: None. No qualified resident was available.    Blood loss: None    Specimens: None    Findings: Fluoroscopic guided lumbar puncture at L3-4 with opening pressure of 27.5 cm H2O with a closing pressure of 7.5 cm H2O following removal of 36 cc of clear, colorless CSF.    Complications: None immediate.    Anesthesia: local and MAC sedation

## 2025-06-03 ENCOUNTER — TELEPHONE (OUTPATIENT)
Dept: NEUROLOGY | Facility: CLINIC | Age: 45
End: 2025-06-03

## 2025-06-03 NOTE — TELEPHONE ENCOUNTER
LVM confirming appointment with Dr. Lucio for 6/4/25 at 8 AM.   Gave [phone] to reschedule if needed.

## 2025-06-04 ENCOUNTER — OFFICE VISIT (OUTPATIENT)
Age: 45
End: 2025-06-04
Payer: COMMERCIAL

## 2025-06-04 VITALS
DIASTOLIC BLOOD PRESSURE: 82 MMHG | HEIGHT: 61 IN | WEIGHT: 208.8 LBS | SYSTOLIC BLOOD PRESSURE: 136 MMHG | BODY MASS INDEX: 39.42 KG/M2

## 2025-06-04 DIAGNOSIS — G25.81 RESTLESS LEG SYNDROME: ICD-10-CM

## 2025-06-04 DIAGNOSIS — E66.09 CLASS 2 OBESITY DUE TO EXCESS CALORIES WITHOUT SERIOUS COMORBIDITY WITH BODY MASS INDEX (BMI) OF 39.0 TO 39.9 IN ADULT: ICD-10-CM

## 2025-06-04 DIAGNOSIS — E61.1 IRON DEFICIENCY: ICD-10-CM

## 2025-06-04 DIAGNOSIS — G47.19 EXCESSIVE DAYTIME SLEEPINESS: Primary | ICD-10-CM

## 2025-06-04 DIAGNOSIS — E66.812 CLASS 2 OBESITY DUE TO EXCESS CALORIES WITHOUT SERIOUS COMORBIDITY WITH BODY MASS INDEX (BMI) OF 39.0 TO 39.9 IN ADULT: ICD-10-CM

## 2025-06-04 DIAGNOSIS — I10 BENIGN HYPERTENSION: ICD-10-CM

## 2025-06-04 PROCEDURE — 99204 OFFICE O/P NEW MOD 45 MIN: CPT | Performed by: INTERNAL MEDICINE

## 2025-06-04 NOTE — ASSESSMENT & PLAN NOTE
Blood pressure today 136/82  Currently on amlodipine 10 mg, losartan 25 mg daily  I explained that optimal treatment of obstructive sleep apnea will improve blood pressure control

## 2025-06-04 NOTE — PROGRESS NOTES
Name: Kaleigh Hernandez      : 1980      MRN: [id number]  Encounter Provider: Hugo Lucio MD  Encounter Date: 2025   Encounter department: [facility]    :  Assessment & Plan  Excessive daytime sleepiness  Suspected sleep apnea  Mallampati class 4, Body mass index is 39.45 kg/m².,  .    At risk for obstructive sleep apnea based on STOP BANG survey based on snoring, tiredness.,  Elevated BMI, elevated blood pressure  S/s: Snoring, excessive daytime sleepiness  Pawnee score:11  I discussed in depth the diagnostic studies and treatment options involved with obstructive sleep apnea  I also discussed in depth the risk of leaving sleep apnea untreated including hypertension, heart failure, arrhythmia, MI and stroke.      Plan  Ordered HST  Patient is amenable to CPAP       Orders:    Home Sleep Study; Future    Iron deficiency    Orders:    TIBC Panel (incl. Iron, TIBC, % Iron Saturation); Future    Class 2 obesity due to excess calories without serious comorbidity with body mass index (BMI) of 39.0 to 39.9 in adult  Counseled patient on lifestyle modifications including diet and exercise  Explained that weight loss will decrease the severity of sleep apnea  Attempting to switch from Ozempic to Zepbound       Benign hypertension  Blood pressure today 136/82  Currently on amlodipine 10 mg, losartan 25 mg daily  I explained that optimal treatment of obstructive sleep apnea will improve blood pressure control       Restless leg syndrome  Ordered iron panel         History of Present Illness     Kaleigh is a pleasant 44-year-old female with past medical history of IIH, type 2 diabetes, hypertension, hypothyroidism, fibromyalgia who presents for evaluation of suspected sleep apnea.  She reports excessive daytime sleepiness with an Pawnee score of 11.  She reports snoring.  She goes to bed at 9 AM and sometimes it will take up to 2 hours to fall asleep.  She gets out of bed at 6 AM.  She has  a highly variable sleep schedule.  She sometimes sleeps 8 hours sometimes sleeps 4 hours.  She takes 9 mg of melatonin to help her sleep.  She has 3-4 nighttime awakenings.  She reports a bothersome urge to move her legs when she gets into bed.  She feels her memory and concentration is decreased.  She reports chronic pain from fibromyalgia.  She has morning headaches.  She talks in her sleep and has frequent nightmares.  She drinks coffee in the morning.  She occasionally drinks alcohol.  She takes 400 mg of gabapentin twice daily.  She states that she is unable to sleep without taking gabapentin.  She is currently on Ozempic and is attempting to switch to Zepbound.  She has a history of adenotonsillectomy.                Sitting and reading: Moderate chance of dozing  Watching TV: Slight chance of dozing  Sitting, inactive in a public place (e.g. a theatre or a meeting): Would never doze  As a passenger in a car for an hour without a break: Moderate chance of dozing  Lying down to rest in the afternoon when circumstances permit: High chance of dozing  Sitting and talking to someone: Would never doze  Sitting quietly after a lunch without alcohol: Moderate chance of dozing  In a car, while stopped for a few minutes in traffic: Slight chance of dozing  Total score: 11       Review of Systems   Constitutional: Negative.    Eyes: Negative.    Respiratory: Negative.     Cardiovascular: Negative.    Gastrointestinal: Negative.    Endocrine: Negative.    Genitourinary: Negative.    Musculoskeletal: Negative.    Allergic/Immunologic: Negative.    Neurological: Negative.    Psychiatric/Behavioral: Negative.       Pertinent positives/negatives included in HPI and also as noted:       Pertinent Medical History           Medical History Reviewed by provider this encounter:  Meds     .  Past Medical History   Past Medical History[1]  Past Surgical History[2]  Family History[3]   reports that she has never smoked. She has  never used smokeless tobacco. She reports that she does not currently use alcohol after a past usage of about 1.0 standard drink of alcohol per week. She reports that she does not use drugs.  Current Outpatient Medications   Medication Instructions    acetaZOLAMIDE (DIAMOX) 500 mg, Oral, 3 times daily    Albuterol Sulfate (VENTOLIN HFA IN)     amLODIPine (NORVASC) 10 mg, Oral, Daily    ARIPiprazole (ABILIFY) 2 mg, Daily at bedtime    atorvastatin (LIPITOR) 40 mg tablet     Azelastine HCl 137 MCG/SPRAY SOLN     BD PEN NEEDLE ANDREA U/F 32G X 4 MM MISC     benzonatate (TESSALON) 200 MG capsule TAKE 1 CAPSULE (200 MG TOTAL) BY MOUTH 3 (THREE) TIMES A DAY AS NEEDED FOR COUGH.    budesonide (PULMICORT) 0.5 mg, Nebulization, 2 times daily, Rinse mouth after use.    busPIRone (BUSPAR) 10 mg, 2 times daily    Diethylpropion HCl 25 MG TABS 1 PO TID 1 hour AC    esomeprazole (NEXIUM) 40 mg, Oral, 2 times daily before meals    famotidine (PEPCID) 40 mg, Oral, Daily at bedtime, At lunch and before bedtime as needed    fenofibrate (TRICOR) 145 mg tablet     fluconazole (DIFLUCAN) 150 mg tablet     fluconazole (DIFLUCAN) 200 mg, Daily    FLUoxetine (PROzac) 40 MG capsule 1 capsule, Daily    FLUoxetine (PROZAC) 60 mg, Daily    fluticasone (Flovent HFA) 110 MCG/ACT inhaler 2 puffs, 2 times daily    gabapentin (NEURONTIN) 400 mg, 2 times daily    Hydrocod Ermias-Chlorphe Ermias ER (TUSSIONEX) 10-8 mg/5 mL ER suspension 5 mL, Every 12 hours PRN    Insulin Pen Needle 32G X 6 MM MISC     ipratropium (ATROVENT) 0.03 % nasal spray 1-2 sprays, 4 times daily PRN    ipratropium-albuterol (DUO-NEB) 0.5-2.5 mg/3 mL nebulizer solution 3 mL, 4 times daily    levothyroxine 88 mcg, Daily    Lidocaine Viscous HCl (XYLOCAINE) 2 % mucosal solution 15 mL, Swish & Spit, 4 times daily PRN    LORazepam (ATIVAN) 0.5 mg tablet 1 tablet, 2 times daily PRN    losartan (COZAAR) 25 mg tablet     meclizine (ANTIVERT) 25 mg tablet     metFORMIN (GLUCOPHAGE-XR) 500 mg  "24 hr tablet     methylphenidate (RITALIN) 20 MG tablet     metoclopramide (REGLAN) 10 mg, Oral, 3 times daily PRN    mirtazapine (REMERON) 7.5 mg, Oral, Daily at bedtime    mometasone (Asmanex, 120 Metered Doses,) 220 mcg/actuation inhaler     montelukast (SINGULAIR) 10 mg tablet 1 tablet, Daily    Ozempic, 2 MG/DOSE, 8 MG/3ML injection pen     Promethazine-Codeine 6.25-10 MG/5ML SOLN     promethazine-dextromethorphan (PHENERGAN-DM) 6.25-15 mg/5 mL oral syrup     rimegepant sulfate (Nurtec) 75 mg TBDP     semaglutide, 0.25 or 0.5 mg/dose, (Ozempic, 0.25 or 0.5 MG/DOSE,) 2 mg/3 mL injection pen     sucralfate (CARAFATE) 1 g, Oral, 3 times daily    sucralfate (CARAFATE) 1 g, Oral, 4 times daily (with meals and at bedtime)    Trintellix 20 mg, Every morning    Ubrogepant (UBRELVY) 100 MG tablet Take 1 tablet (100 mg) one time as needed for migraine. May repeat one additional tablet (100 mg) at least two hours after the first dose. Do not use more than two doses per day, or for more than eight days per month.    Vortioxetine HBr (Trintellix) 5 MG tablet 1 tablet, Every morning    Wegovy 0.5 MG/0.5ML INJECT 0.5MG UNDER THE SKIN EVERY 7 DAYS   Allergies[4]   Medications Ordered Prior to Encounter[5]   Social History[6]  Objective   /82 (BP Location: Right arm, Patient Position: Sitting, Cuff Size: Standard)   Ht 5' 1\" (1.549 m)   Wt 94.7 kg (208 lb 12.8 oz)   BMI 39.45 kg/m²        Physical Exam  Vitals reviewed.   HENT:      Head: Normocephalic and atraumatic.      Nose: Nose normal.      Mouth/Throat:      Mouth: Mucous membranes are moist.     Eyes:      Extraocular Movements: Extraocular movements intact.      Pupils: Pupils are equal, round, and reactive to light.       Cardiovascular:      Rate and Rhythm: Normal rate and regular rhythm.      Pulses: Normal pulses.   Pulmonary:      Effort: Pulmonary effort is normal.      Breath sounds: Normal breath sounds.   Abdominal:      General: Abdomen is flat. " "Bowel sounds are normal.      Palpations: Abdomen is soft.     Musculoskeletal:         General: Normal range of motion.      Cervical back: Normal range of motion.     Skin:     General: Skin is warm.     Neurological:      General: No focal deficit present.      Mental Status: She is alert and oriented to person, place, and time. Mental status is at baseline.       Visit Vitals  /82 (BP Location: Right arm, Patient Position: Sitting, Cuff Size: Standard)   Ht 5' 1\" (1.549 m)   Wt 94.7 kg (208 lb 12.8 oz)   BMI 39.45 kg/m²   OB Status Hysterectomy   Smoking Status Never   BSA 1.92 m²           Data  Lab Results   Component Value Date    HGB 12.0 2025    HCT 37.3 2025    MCV 87 2025      Lab Results   Component Value Date    CALCIUM 9.3 2025    K 4.1 2025    CO2 23 2025     2025    BUN 14 2025    CREATININE 0.89 2025     No results found for: \"IRON\", \"TIBC\", \"FERRITIN\"  Lab Results   Component Value Date    AST 20 2025    ALT 27 2025                  [1]   Past Medical History:  Diagnosis Date    Acid reflux     Anxiety     Asthma     Depression     Fibromyalgia     Fibromyalgia     GERD (gastroesophageal reflux disease)     Headache     Hyperlipidemia     Hypertension     Hypothyroidism     IBS (irritable bowel syndrome)     Infertility, female     Insulin resistance     Obesity (BMI 30-39.9)     Occipital neuralgia     Papilledema     PCOS (polycystic ovarian syndrome)     Pseudotumor cerebri     Tinnitus     Tremor    [2]   Past Surgical History:  Procedure Laterality Date    ANKLE FRACTURE SURGERY Right     hardware     SECTION      CHOLECYSTECTOMY      DILATION AND CURETTAGE OF UTERUS      ENDOMETRIAL BIOPSY      FL LUMBAR PUNCTURE DIAGNOSTIC  2018    HYSTERECTOMY      IR SPINE PROCEDURE  2025    TONSILLECTOMY AND ADENOIDECTOMY      TUBAL LIGATION      UMBILICAL HERNIA REPAIR      UPPER GASTROINTESTINAL ENDOSCOPY   "   [3]   Family History  Problem Relation Name Age of Onset    Hypertension Mother      Hypothyroidism Mother      Hypertension Father      Hypothyroidism Father      Insulin resistance Father      Hypothyroidism Sister     [4]   Allergies  Allergen Reactions    Fentanyl Other (See Comments)     PT STATES DOES NOT WANT FENTANYL    Other reaction(s): Nausea and/or vomiting    PT STATES DOES NOT WANT FENTANYL - nightmare reaction    Amoxicillin-Pot Clavulanate GI Intolerance    Cefdinir GI Intolerance    Cephalosporins GI Intolerance    Erythromycin GI Intolerance    Pollen Extract     Sulfa Antibiotics Other (See Comments)     Unknown, pt was told to stay away from [facility] since child    Sulfasalazine      has ibs and vomits  has ibs and vomits     [5]   Current Outpatient Medications on File Prior to Visit   Medication Sig Dispense Refill    acetaZOLAMIDE (DIAMOX) 250 mg tablet Take 2 tablets (500 mg total) by mouth 3 (three) times a day 120 tablet 2    Albuterol Sulfate (VENTOLIN HFA IN)       amLODIPine (NORVASC) 10 mg tablet Take 1 tablet (10 mg total) by mouth daily 21 tablet 0    ARIPiprazole (ABILIFY) 2 mg tablet Take 2 mg by mouth daily at bedtime      atorvastatin (LIPITOR) 40 mg tablet       Azelastine HCl 137 MCG/SPRAY SOLN       BD PEN NEEDLE ANDREA U/F 32G X 4 MM MISC   4    budesonide (PULMICORT) 0.5 mg/2 mL nebulizer solution Take 2 mL (0.5 mg total) by nebulization 2 (two) times a day Rinse mouth after use. 120 mL 0    busPIRone (BUSPAR) 10 mg tablet Take 10 mg by mouth in the morning and 10 mg in the evening.      Diethylpropion HCl 25 MG TABS 1 PO TID 1 hour AC 90 tablet 1    famotidine (PEPCID) 40 MG tablet Take 1 tablet (40 mg total) by mouth daily at bedtime At lunch and before bedtime as needed 180 tablet 1    fluconazole (DIFLUCAN) 150 mg tablet       fluconazole (DIFLUCAN) 200 mg tablet Take 200 mg by mouth daily      FLUoxetine (PROzac) 20 mg capsule Take 60 mg by mouth in the morning.       FLUoxetine (PROzac) 40 MG capsule Take 1 capsule by mouth in the morning      fluticasone (Flovent HFA) 110 MCG/ACT inhaler Inhale 2 puffs 2 (two) times a day      gabapentin (NEURONTIN) 400 mg capsule Take 400 mg by mouth in the morning and 400 mg in the evening.      Insulin Pen Needle 32G X 6 MM MISC       ipratropium (ATROVENT) 0.03 % nasal spray 1-2 sprays into each nostril as needed in the morning and 1-2 sprays as needed at noon and 1-2 sprays as needed in the evening and 1-2 sprays as needed before bedtime.      ipratropium-albuterol (DUO-NEB) 0.5-2.5 mg/3 mL nebulizer solution Inhale 3 mL 4 (four) times a day      levothyroxine 88 mcg tablet Take 88 mcg by mouth in the morning.      Lidocaine Viscous HCl (XYLOCAINE) 2 % mucosal solution Swish and spit 15 mL 4 (four) times a day as needed for mouth pain or discomfort 100 mL 0    LORazepam (ATIVAN) 0.5 mg tablet Take 1 tablet by mouth as needed in the morning and 1 tablet as needed in the evening.      losartan (COZAAR) 25 mg tablet       meclizine (ANTIVERT) 25 mg tablet   1    metFORMIN (GLUCOPHAGE-XR) 500 mg 24 hr tablet       methylphenidate (RITALIN) 20 MG tablet       metoclopramide (Reglan) 10 mg tablet Take 1 tablet (10 mg total) by mouth 3 (three) times a day as needed (nausea, headache) 15 tablet 0    mometasone (Asmanex, 120 Metered Doses,) 220 mcg/actuation inhaler       montelukast (SINGULAIR) 10 mg tablet Take 1 tablet by mouth in the morning      Ozempic, 2 MG/DOSE, 8 MG/3ML injection pen       Promethazine-Codeine 6.25-10 MG/5ML SOLN       promethazine-dextromethorphan (PHENERGAN-DM) 6.25-15 mg/5 mL oral syrup       rimegepant sulfate (Nurtec) 75 mg TBDP       semaglutide, 0.25 or 0.5 mg/dose, (Ozempic, 0.25 or 0.5 MG/DOSE,) 2 mg/3 mL injection pen       sucralfate (CARAFATE) 1 g/10 mL suspension Take 10 mL (1 g total) by mouth 4 (four) times a day (with meals and at bedtime) 473 mL 2    Ubrogepant (UBRELVY) 100 MG tablet Take 1 tablet (100  mg) one time as needed for migraine. May repeat one additional tablet (100 mg) at least two hours after the first dose. Do not use more than two doses per day, or for more than eight days per month. 9 tablet 6    Vortioxetine HBr (Trintellix) 5 MG tablet Take 1 tablet by mouth every morning      benzonatate (TESSALON) 200 MG capsule TAKE 1 CAPSULE (200 MG TOTAL) BY MOUTH 3 (THREE) TIMES A DAY AS NEEDED FOR COUGH. (Patient not taking: Reported on 3/19/2025)      esomeprazole (NexIUM) 40 MG capsule Take 1 capsule (40 mg total) by mouth 2 (two) times a day before meals 180 capsule 2    fenofibrate (TRICOR) 145 mg tablet       Hydrocod Ermias-Chlorphe Ermias ER (TUSSIONEX) 10-8 mg/5 mL ER suspension Take 5 mL by mouth every 12 (twelve) hours as needed (Patient not taking: Reported on 12/18/2024)      mirtazapine (REMERON) 7.5 MG tablet Take 1 tablet (7.5 mg total) by mouth daily at bedtime for 30 days (Patient not taking: Reported on 3/19/2025) 30 tablet 0    sucralfate (CARAFATE) 1 g tablet TAKE 1 TABLET (1 G TOTAL) BY MOUTH 3 (THREE) TIMES A DAY FOR 14 DAYS 42 tablet 0    Trintellix 20 MG tablet Take 20 mg by mouth every morning (Patient not taking: Reported on 12/18/2024)      Wegovy 0.5 MG/0.5ML INJECT 0.5MG UNDER THE SKIN EVERY 7 DAYS (Patient not taking: Reported on 3/19/2025)       No current facility-administered medications on file prior to visit.   [6]   Social History  Tobacco Use    Smoking status: Never    Smokeless tobacco: Never   Vaping Use    Vaping status: Never Used   Substance and Sexual Activity    Alcohol use: Not Currently     Alcohol/week: 1.0 standard drink of alcohol     Types: 1 Standard drinks or equivalent per week     Comment: social    Drug use: No

## 2025-06-04 NOTE — PATIENT INSTRUCTIONS
At home sleep study  Get iron panel done on an empty stomach in the morning avoid eating meat the night prior

## 2025-06-22 DIAGNOSIS — R07.9 CHEST PAIN, UNSPECIFIED TYPE: ICD-10-CM

## 2025-06-22 DIAGNOSIS — Z79.1 NSAID LONG-TERM USE: ICD-10-CM

## 2025-06-22 DIAGNOSIS — R13.19 ESOPHAGEAL DYSPHAGIA: ICD-10-CM

## 2025-06-22 DIAGNOSIS — K21.9 GASTROESOPHAGEAL REFLUX DISEASE, UNSPECIFIED WHETHER ESOPHAGITIS PRESENT: ICD-10-CM

## 2025-06-23 RX ORDER — FAMOTIDINE 40 MG/1
40 TABLET, FILM COATED ORAL
Qty: 180 TABLET | Refills: 1 | Status: SHIPPED | OUTPATIENT
Start: 2025-06-23

## 2025-07-02 ENCOUNTER — HOSPITAL ENCOUNTER (OUTPATIENT)
Facility: HOSPITAL | Age: 45
Discharge: HOME | End: 2025-07-02
Attending: INTERNAL MEDICINE
Payer: COMMERCIAL

## 2025-07-02 DIAGNOSIS — G47.19 EXCESSIVE DAYTIME SLEEPINESS: ICD-10-CM

## 2025-07-02 PROCEDURE — G0399 HOME SLEEP TEST/TYPE 3 PORTA: HCPCS

## 2025-07-03 NOTE — PROGRESS NOTES
Home Sleep Study Documentation    HOME STUDY DEVICE: Noxturnal no                                           Michelle G3 yes      Pre-Sleep Home Study:    Set-up and instructions performed by: ST    Technician performed demonstration for Patient: yes    Return demonstration performed by Patient: yes    Written instructions provided to Patient: yes    Patient signed consent form: yes          Post-Sleep Home Study:    Post Test Questionnaire Data: Pending    Additional comments by Patient: pending    Home Sleep Study Failed:pending    Failure reason: pending    Reported or Detected: pending    Scored by: pending

## 2025-07-10 ENCOUNTER — TELEPHONE (OUTPATIENT)
Dept: SLEEP CENTER | Facility: CLINIC | Age: 45
End: 2025-07-10

## 2025-07-10 NOTE — TELEPHONE ENCOUNTER
Incoming call from patient looking for sleep study results.      Study not resulted yet. Informed patient it can take up to 2 weeks and that she will receive a notification from [employer] that she has new test results once it is read.      Encourage patient to call back at that time with questions/concerns. And advised Nurses will reach out after study resulted.

## 2025-07-11 PROBLEM — G47.33 OSA (OBSTRUCTIVE SLEEP APNEA): Status: ACTIVE | Noted: 2025-07-11

## 2025-07-13 DIAGNOSIS — G47.33 OSA (OBSTRUCTIVE SLEEP APNEA): Primary | ICD-10-CM

## 2025-07-16 ENCOUNTER — TELEPHONE (OUTPATIENT)
Dept: SLEEP CENTER | Facility: CLINIC | Age: 45
End: 2025-07-16

## 2025-07-16 NOTE — TELEPHONE ENCOUNTER
Could you have the PSR arrange for Lance to have a routine follow up with Dr. Lucia? Thanks [place] Patient called for Sleep Study results.  She saw results online.  I reviewed results with her.  She would like to use [facility].  Scheduled compliance appointment for beginning of September with Dr. Lucio.  All of patients questions were answered.

## 2025-07-17 LAB

## 2025-07-24 ENCOUNTER — TELEPHONE (OUTPATIENT)
Age: 45
End: 2025-07-24

## 2025-07-24 LAB

## 2025-07-24 NOTE — TELEPHONE ENCOUNTER
I gave a ResMed S11 set at 5-15 cm and gave an AirFit N30 (M) mask. I turn on pressure with mask fit. We discussed cleaning, resupply and compliance. Chrissy downloaded.  S/N #[id number]  D/N# [id number]

## 2025-08-01 ENCOUNTER — TELEPHONE (OUTPATIENT)
Dept: NEUROLOGY | Facility: CLINIC | Age: 45
End: 2025-08-01